# Patient Record
Sex: FEMALE | Race: WHITE | NOT HISPANIC OR LATINO | Employment: OTHER | URBAN - METROPOLITAN AREA
[De-identification: names, ages, dates, MRNs, and addresses within clinical notes are randomized per-mention and may not be internally consistent; named-entity substitution may affect disease eponyms.]

---

## 2017-03-28 ENCOUNTER — HISTORICAL (OUTPATIENT)
Dept: ADMINISTRATIVE | Facility: HOSPITAL | Age: 77
End: 2017-03-28

## 2017-05-18 RX ORDER — ENALAPRIL MALEATE 10 MG/1
TABLET ORAL
Qty: 90 TABLET | Refills: 3 | Status: SHIPPED | OUTPATIENT
Start: 2017-05-18 | End: 2018-05-10 | Stop reason: SDUPTHER

## 2017-05-18 RX ORDER — MONTELUKAST SODIUM 10 MG/1
TABLET ORAL
Qty: 90 TABLET | Refills: 3 | Status: SHIPPED | OUTPATIENT
Start: 2017-05-18 | End: 2018-05-10 | Stop reason: SDUPTHER

## 2017-06-02 RX ORDER — SIMVASTATIN 20 MG/1
TABLET, FILM COATED ORAL
Qty: 90 TABLET | Refills: 2 | Status: SHIPPED | OUTPATIENT
Start: 2017-06-02 | End: 2018-03-05 | Stop reason: SDUPTHER

## 2017-07-17 RX ORDER — ALBUTEROL SULFATE 0.83 MG/ML
SOLUTION RESPIRATORY (INHALATION)
Qty: 150 ML | Refills: 10 | Status: SHIPPED | OUTPATIENT
Start: 2017-07-17 | End: 2021-11-05

## 2017-07-28 ENCOUNTER — OFFICE VISIT (OUTPATIENT)
Dept: FAMILY MEDICINE | Facility: CLINIC | Age: 77
End: 2017-07-28
Payer: MEDICARE

## 2017-07-28 VITALS
DIASTOLIC BLOOD PRESSURE: 70 MMHG | HEART RATE: 76 BPM | BODY MASS INDEX: 29.39 KG/M2 | HEIGHT: 58 IN | RESPIRATION RATE: 16 BRPM | TEMPERATURE: 99 F | WEIGHT: 140 LBS | SYSTOLIC BLOOD PRESSURE: 110 MMHG

## 2017-07-28 DIAGNOSIS — M17.11 ARTHRITIS OF RIGHT KNEE: ICD-10-CM

## 2017-07-28 DIAGNOSIS — M19.90 OSTEOARTHRITIS, UNSPECIFIED OSTEOARTHRITIS TYPE, UNSPECIFIED SITE: ICD-10-CM

## 2017-07-28 DIAGNOSIS — K57.32 DIVERTICULITIS OF LARGE INTESTINE WITHOUT PERFORATION OR ABSCESS WITHOUT BLEEDING: ICD-10-CM

## 2017-07-28 PROCEDURE — 1159F MED LIST DOCD IN RCRD: CPT | Mod: ,,, | Performed by: FAMILY MEDICINE

## 2017-07-28 PROCEDURE — 99202 OFFICE O/P NEW SF 15 MIN: CPT | Mod: ,,, | Performed by: FAMILY MEDICINE

## 2017-07-28 RX ORDER — MELOXICAM 7.5 MG/1
7.5 TABLET ORAL DAILY
Qty: 30 TABLET | Refills: 11 | Status: SHIPPED | OUTPATIENT
Start: 2017-07-28 | End: 2018-03-16 | Stop reason: ALTCHOICE

## 2017-07-28 NOTE — PROGRESS NOTES
Subjective:       Patient ID: Teressa Benson is a 77 y.o. female.    Chief Complaint: Diverticulitis and Leg Pain    Admitted in the hospital last Thursday with diverticulitis. She was still having a lot of pain upon discharge but pain has improved since that time still on Levaquin and Flagyl since discharge. Previous episode was 2-1/2 years ago.      Hip Pain    Incident onset: off and on years. Had a workup for this in February which include x-ray showing some spondylolysis spondylolisthesis and and L6 lumbar vertebra but no acute fractures or subluxation beyond that. She has not been taking anything prescription wise for this.  The treatment provided mild relief.    Review of Systems   Musculoskeletal:        She says she does have pain in the right knee which is worsened with increased activity and and gets better with rest.       Objective:      Physical Exam   Pulmonary/Chest: Effort normal and breath sounds normal. No respiratory distress. She has no wheezes. She has no rales. She exhibits no tenderness.   Abdominal: Soft. Bowel sounds are normal. She exhibits no distension and no mass. There is tenderness. There is guarding (minimal). There is no rebound. No hernia.       Assessment:       1. Osteoarthritis, unspecified osteoarthritis type, unspecified site    2. Arthritis of right knee    3. Diverticulitis of large intestine without perforation or abscess without bleeding        Plan:       Teressa was seen today for diverticulitis and leg pain.    Diagnoses and all orders for this visit:    Osteoarthritis, unspecified osteoarthritis type, unspecified site  -     Ambulatory referral to Orthopedics    Arthritis of right knee  -     Ambulatory referral to Orthopedics    Diverticulitis of large intestine without perforation or abscess without bleeding  -     CBC auto differential; Future  -     C-reactive protein; Future  -     Rheumatoid factor; Future  -     CBC auto differential  -     C-reactive protein  -      Rheumatoid factor    Other orders  -     meloxicam (MOBIC) 7.5 MG tablet; Take 1 tablet (7.5 mg total) by mouth once daily.         Return in about 6 months (around 1/28/2018).

## 2017-08-10 LAB
BASOPHILS NFR BLD: 0.1 K/UL (ref 0–0.2)
BASOPHILS NFR BLD: 0.9 %
CRP SERPL-MCNC: 0.11 MG/DL (ref 0–1.4)
EOSINOPHIL NFR BLD: 0.4 K/UL (ref 0–0.7)
EOSINOPHIL NFR BLD: 6.8 %
ERYTHROCYTE [DISTWIDTH] IN BLOOD BY AUTOMATED COUNT: 13.4 % (ref 11.7–14.9)
GRAN #: 2.7 K/UL (ref 1.4–6.5)
GRAN%: 41.9 %
HCT VFR BLD AUTO: 43.9 % (ref 36–48)
HGB BLD-MCNC: 14.5 G/DL (ref 12–15)
IMMATURE GRANS (ABS): 0 K/UL (ref 0–1)
IMMATURE GRANULOCYTES: 0.3 %
LYMPH #: 2.7 K/UL (ref 1.2–3.4)
LYMPH%: 41.6 %
MCH RBC QN AUTO: 30.7 PG (ref 25–35)
MCHC RBC AUTO-ENTMCNC: 33 G/DL (ref 31–36)
MCV RBC AUTO: 92.8 FL (ref 79–98)
MONO #: 0.6 K/UL (ref 0.1–0.6)
MONO%: 8.5 %
NUCLEATED RBCS: 0 %
PLATELET # BLD AUTO: 245 K/UL (ref 140–440)
PMV BLD AUTO: 9.5 FL (ref 8.8–12.7)
RBC # BLD AUTO: 4.73 M/UL (ref 3.5–5.5)
WBC # BLD AUTO: 6.5 K/UL (ref 5–10)

## 2017-08-11 LAB — RHEUMATOID FACT SERPL-ACNC: <10 IU/ML (ref 0–13.9)

## 2017-09-27 ENCOUNTER — OFFICE VISIT (OUTPATIENT)
Dept: FAMILY MEDICINE | Facility: CLINIC | Age: 77
End: 2017-09-27
Payer: MEDICARE

## 2017-09-27 VITALS
SYSTOLIC BLOOD PRESSURE: 124 MMHG | HEIGHT: 59 IN | BODY MASS INDEX: 28.63 KG/M2 | TEMPERATURE: 98 F | RESPIRATION RATE: 20 BRPM | WEIGHT: 142 LBS | HEART RATE: 68 BPM | DIASTOLIC BLOOD PRESSURE: 80 MMHG

## 2017-09-27 DIAGNOSIS — R73.02 IGT (IMPAIRED GLUCOSE TOLERANCE): ICD-10-CM

## 2017-09-27 DIAGNOSIS — J44.9 CHRONIC OBSTRUCTIVE PULMONARY DISEASE, UNSPECIFIED COPD TYPE: ICD-10-CM

## 2017-09-27 DIAGNOSIS — I10 ESSENTIAL HYPERTENSION: ICD-10-CM

## 2017-09-27 PROCEDURE — 3008F BODY MASS INDEX DOCD: CPT | Mod: ,,, | Performed by: FAMILY MEDICINE

## 2017-09-27 PROCEDURE — 99214 OFFICE O/P EST MOD 30 MIN: CPT | Mod: ,,, | Performed by: FAMILY MEDICINE

## 2017-09-27 PROCEDURE — 1159F MED LIST DOCD IN RCRD: CPT | Mod: ,,, | Performed by: FAMILY MEDICINE

## 2017-09-27 NOTE — LETTER
[facility]      LANRE FLANAGAN MD  [facility]      Re: Name: Teressa Benson          : 1940        Teressa Benson has been examined by me on this date, and appears to be physically well for       _x__ General Anesthesia and surgery      ___ Sports Participation     ___ School /      ___ Camp        Sincerely,      Lanre Flanagan MD                Billing & Mailing Address  [address] [address]  [place] [address]  Phone: [phone]    Fax: [phone]              [email or web address]

## 2017-09-27 NOTE — PROGRESS NOTES
Subjective:       Patient ID: Teressa Benson is a 77 y.o. female.    Chief Complaint: Pre-op Exam (cataract surgery)    Here for preop physical for cataract surgery right eye first. Doing well she does have arthritis and some chronic pain which the meloxicam is helping      Review of Systems    Objective:      Physical Exam   Cardiovascular: Normal rate, regular rhythm, normal heart sounds and intact distal pulses.  Exam reveals no gallop and no friction rub.    No murmur heard.  Pulmonary/Chest: Effort normal and breath sounds normal. No respiratory distress. She has no wheezes. She has no rales. She exhibits no tenderness.   Abdominal: Soft. Bowel sounds are normal.       Assessment:       1. IGT (impaired glucose tolerance)    2. Chronic obstructive pulmonary disease, unspecified COPD type    3. Essential hypertension        Plan:       Teressa was seen today for pre-op exam.    Diagnoses and all orders for this visit:    IGT (impaired glucose tolerance)    Chronic obstructive pulmonary disease, unspecified COPD type    Essential hypertension         No Follow-up on file.

## 2018-01-26 ENCOUNTER — OFFICE VISIT (OUTPATIENT)
Dept: FAMILY MEDICINE | Facility: CLINIC | Age: 78
End: 2018-01-26
Payer: MEDICARE

## 2018-01-26 VITALS
DIASTOLIC BLOOD PRESSURE: 70 MMHG | TEMPERATURE: 99 F | HEIGHT: 59 IN | BODY MASS INDEX: 28.63 KG/M2 | SYSTOLIC BLOOD PRESSURE: 120 MMHG | WEIGHT: 142 LBS | RESPIRATION RATE: 20 BRPM | HEART RATE: 84 BPM

## 2018-01-26 DIAGNOSIS — J44.9 CHRONIC OBSTRUCTIVE PULMONARY DISEASE, UNSPECIFIED COPD TYPE: Primary | ICD-10-CM

## 2018-01-26 DIAGNOSIS — I10 ESSENTIAL HYPERTENSION: ICD-10-CM

## 2018-01-26 DIAGNOSIS — R73.02 IGT (IMPAIRED GLUCOSE TOLERANCE): ICD-10-CM

## 2018-01-26 PROCEDURE — 83036 HEMOGLOBIN GLYCOSYLATED A1C: CPT | Mod: ,,, | Performed by: FAMILY MEDICINE

## 2018-01-26 PROCEDURE — 82947 ASSAY GLUCOSE BLOOD QUANT: CPT | Mod: QW,,, | Performed by: FAMILY MEDICINE

## 2018-01-26 PROCEDURE — 80061 LIPID PANEL: CPT | Mod: QW,,, | Performed by: FAMILY MEDICINE

## 2018-01-26 PROCEDURE — 99213 OFFICE O/P EST LOW 20 MIN: CPT | Mod: ,,, | Performed by: FAMILY MEDICINE

## 2018-01-26 RX ORDER — NEPAFENAC 3 MG/ML
SUSPENSION/ DROPS OPHTHALMIC
COMMUNITY
Start: 2017-10-22 | End: 2018-05-17

## 2018-01-26 RX ORDER — PREDNISOLONE ACETATE 10 MG/ML
SUSPENSION/ DROPS OPHTHALMIC
COMMUNITY
Start: 2017-11-06 | End: 2018-03-16 | Stop reason: ALTCHOICE

## 2018-01-26 RX ORDER — ALBUTEROL SULFATE 90 UG/1
2 AEROSOL, METERED RESPIRATORY (INHALATION) EVERY 6 HOURS PRN
Qty: 1 INHALER | Refills: 1 | Status: SHIPPED | OUTPATIENT
Start: 2018-01-26 | End: 2022-01-25

## 2018-01-26 RX ORDER — MOXIFLOXACIN 5 MG/ML
SOLUTION/ DROPS OPHTHALMIC
COMMUNITY
Start: 2017-10-19 | End: 2018-03-16 | Stop reason: ALTCHOICE

## 2018-01-26 NOTE — PROGRESS NOTES
Subjective:       Patient ID: Teressa Benson is a 77 y.o. female.    Chief Complaint: Follow-up (6 months  IGT)      Patient is here for follow-up on impaired glucose tolerance she did have a visit to the urgent care because of bronchitis approximately one month ago. Was treated with Zithromax and prednisone for 5 days has been off the prednisone for nearly a month.  Doing better. Taking Tylenol for degenerative joint disease arthritis in both knees not tolerate meloxicam        Diabetes   Diabetes visit type: impaired glucose tolerance. Associated symptoms include polyuria. Pertinent negatives for diabetes include no blurred vision, no chest pain, no fatigue, no foot paresthesias, no foot ulcerations, no polydipsia, no visual change, no weakness and no weight loss. Pertinent negatives for hypoglycemia complications include no blackouts and no hospitalization. Pertinent negatives for diabetic complications include no autonomic neuropathy, CVA, heart disease, impotence, nephropathy, peripheral neuropathy, PVD or retinopathy.   Still some a.m. wheezes using Qvar and albuterol on a regular basis.    Allergies and Medications:   Review of patient's allergies indicates:   Allergen Reactions    Ciprofloxacin     Demerol [meperidine]     Phenergan [promethazine]      Current Outpatient Prescriptions   Medication Sig Dispense Refill    albuterol (PROVENTIL) 2.5 mg /3 mL (0.083 %) nebulizer solution INHALE ONE VIAL VIA NEBULIZER EVERY FOUR HOURS AS NEEDED 150 mL 10    CRAN/VITC/MANNOSE/FOS/BROMELN (CYSTEX CRANBERRY ORAL) Take 1 tablet by mouth 3 (three) times daily as needed.      enalapril (VASOTEC) 10 MG tablet TAKE ONE TABLET BY MOUTH ONCE DAILY 90 tablet 3    ILEVRO 0.3 % DrpS       montelukast (SINGULAIR) 10 mg tablet TAKE ONE TABLET BY MOUTH ONCE DAILY 90 tablet 3    moxifloxacin (VIGAMOX) 0.5 % ophthalmic solution       prednisoLONE acetate (PRED FORTE) 1 % DrpS       QVAR 40 mcg/actuation Aero        simvastatin (ZOCOR) 20 MG tablet TAKE ONE TABLET BY MOUTH EVERY EVENING. 90 tablet 2    albuterol 90 mcg/actuation inhaler Inhale 2 puffs into the lungs every 6 (six) hours as needed for Wheezing. Rescue  every four to six hours as needed 1 Inhaler 1    meloxicam (MOBIC) 7.5 MG tablet Take 1 tablet (7.5 mg total) by mouth once daily. 30 tablet 11     No current facility-administered medications for this visit.        Family History:   Family History   Problem Relation Age of Onset    Asthma Mother     Heart failure Father        Social History:   Social History     Social History    Marital status:      Spouse name: N/A    Number of children: N/A    Years of education: N/A     Occupational History    Not on file.     Social History Main Topics    Smoking status: Former Smoker     Types: Cigarettes    Smokeless tobacco: Never Used    Alcohol use No    Drug use: No    Sexual activity: Not on file     Other Topics Concern    Not on file     Social History Narrative    No narrative on file       Review of Systems   Constitutional: Negative for fatigue and weight loss.   Eyes: Negative for blurred vision.   Cardiovascular: Negative for chest pain.   Endocrine: Positive for polyuria. Negative for polydipsia.   Genitourinary: Negative for impotence.   Neurological: Negative for weakness.       Objective:     Vitals:    01/26/18 1456   BP: 120/70   Pulse: 84   Resp: 20   Temp: 99.3 °F (37.4 °C)        Physical Exam    Assessment:       1. Chronic obstructive pulmonary disease, unspecified COPD type    2. Essential hypertension    3. IGT (impaired glucose tolerance)        Plan:       Teressa was seen today for follow-up.    Diagnoses and all orders for this visit:    Chronic obstructive pulmonary disease, unspecified COPD type  -     albuterol 90 mcg/actuation inhaler; Inhale 2 puffs into the lungs every 6 (six) hours as needed for Wheezing. Rescue  every four to six hours as needed    Essential  hypertension    IGT (impaired glucose tolerance)  -     Hemoglobin A1C, POCT  -     POCT Lipid Profile with Glucose; Future         Follow-up in about 3 months (around 4/26/2018).

## 2018-01-29 ENCOUNTER — TELEPHONE (OUTPATIENT)
Dept: FAMILY MEDICINE | Facility: CLINIC | Age: 78
End: 2018-01-29

## 2018-01-29 LAB
GLUCOSE SERPL-MCNC: NORMAL MG/DL (ref 70–110)
HBA1C MFR BLD: 5.7 %
POC CHOLESTEROL, HDL: 51
POC CHOLESTEROL, LDL: 87
POC CHOLESTEROL, TOTAL: 162 MG/DL
POC GLUCOSE FASTING: 105
POC TOTAL CHOLESTEROL / HDL RATIO: 3.1
POC TRIGLYCERIDES: 118

## 2018-01-29 NOTE — TELEPHONE ENCOUNTER
----- Message from Lanre Flanagan MD sent at 1/29/2018 11:25 AM CST -----  Labs okay. Impaired glucose tolerance is stable. Continue present meds and recheck in 1 year

## 2018-03-05 RX ORDER — SIMVASTATIN 20 MG/1
TABLET, FILM COATED ORAL
Qty: 90 TABLET | Refills: 1 | Status: SHIPPED | OUTPATIENT
Start: 2018-03-05 | End: 2023-10-31 | Stop reason: SDUPTHER

## 2018-03-16 ENCOUNTER — OFFICE VISIT (OUTPATIENT)
Dept: FAMILY MEDICINE | Facility: CLINIC | Age: 78
End: 2018-03-16
Payer: MEDICARE

## 2018-03-16 VITALS
RESPIRATION RATE: 20 BRPM | WEIGHT: 138 LBS | DIASTOLIC BLOOD PRESSURE: 76 MMHG | BODY MASS INDEX: 27.82 KG/M2 | HEART RATE: 76 BPM | SYSTOLIC BLOOD PRESSURE: 118 MMHG | TEMPERATURE: 99 F | HEIGHT: 59 IN

## 2018-03-16 DIAGNOSIS — J42 CHRONIC BRONCHITIS, UNSPECIFIED CHRONIC BRONCHITIS TYPE: Primary | ICD-10-CM

## 2018-03-16 DIAGNOSIS — J40 BRONCHITIS: ICD-10-CM

## 2018-03-16 PROCEDURE — 3078F DIAST BP <80 MM HG: CPT | Mod: ,,, | Performed by: FAMILY MEDICINE

## 2018-03-16 PROCEDURE — 96372 THER/PROPH/DIAG INJ SC/IM: CPT | Mod: ,,, | Performed by: FAMILY MEDICINE

## 2018-03-16 PROCEDURE — 99213 OFFICE O/P EST LOW 20 MIN: CPT | Mod: 25,,, | Performed by: FAMILY MEDICINE

## 2018-03-16 PROCEDURE — 3074F SYST BP LT 130 MM HG: CPT | Mod: ,,, | Performed by: FAMILY MEDICINE

## 2018-03-16 RX ORDER — AZITHROMYCIN 250 MG/1
250 TABLET, FILM COATED ORAL DAILY
Qty: 6 TABLET | Refills: 0 | Status: SHIPPED | OUTPATIENT
Start: 2018-03-16 | End: 2018-03-21

## 2018-03-16 RX ORDER — PROMETHAZINE HYDROCHLORIDE AND DEXTROMETHORPHAN HYDROBROMIDE 6.25; 15 MG/5ML; MG/5ML
5 SYRUP ORAL 4 TIMES DAILY
Qty: 180 ML | Refills: 5 | Status: SHIPPED | OUTPATIENT
Start: 2018-03-16 | End: 2018-03-26

## 2018-03-16 RX ORDER — BETAMETHASONE SODIUM PHOSPHATE AND BETAMETHASONE ACETATE 3; 3 MG/ML; MG/ML
6 INJECTION, SUSPENSION INTRA-ARTICULAR; INTRALESIONAL; INTRAMUSCULAR; SOFT TISSUE
Status: COMPLETED | OUTPATIENT
Start: 2018-03-16 | End: 2018-03-16

## 2018-03-16 RX ADMIN — BETAMETHASONE SODIUM PHOSPHATE AND BETAMETHASONE ACETATE 6 MG: 3; 3 INJECTION, SUSPENSION INTRA-ARTICULAR; INTRALESIONAL; INTRAMUSCULAR; SOFT TISSUE at 04:03

## 2018-03-16 NOTE — PROGRESS NOTES
Subjective:       Patient ID: Teressa Benson is a 78 y.o. female.    Chief Complaint: URI and Cough      Patient relates that she has had a bad head cold since Sunday is now progressing into the chest      URI    There has been no fever. Associated symptoms include coughing, a plugged ear sensation and sinus pain. Pertinent negatives include no abdominal pain, ear pain, nausea, rash or vomiting. Improvement on treatment: A she does use her Qvar twice a day and her albuterol nebulizer on a regular basis.   Cough   Pertinent negatives include no ear pain or rash.       Allergies and Medications:   Review of patient's allergies indicates:   Allergen Reactions    Ciprofloxacin     Demerol [meperidine]     Phenergan [promethazine]      Current Outpatient Prescriptions   Medication Sig Dispense Refill    albuterol (PROVENTIL) 2.5 mg /3 mL (0.083 %) nebulizer solution INHALE ONE VIAL VIA NEBULIZER EVERY FOUR HOURS AS NEEDED 150 mL 10    albuterol 90 mcg/actuation inhaler Inhale 2 puffs into the lungs every 6 (six) hours as needed for Wheezing. Rescue  every four to six hours as needed 1 Inhaler 1    CRAN/VITC/MANNOSE/FOS/BROMELN (CYSTEX CRANBERRY ORAL) Take 1 tablet by mouth 3 (three) times daily as needed.      enalapril (VASOTEC) 10 MG tablet TAKE ONE TABLET BY MOUTH ONCE DAILY 90 tablet 3    montelukast (SINGULAIR) 10 mg tablet TAKE ONE TABLET BY MOUTH ONCE DAILY 90 tablet 3    QVAR 40 mcg/actuation Aero       simvastatin (ZOCOR) 20 MG tablet TAKE ONE TABLET BY MOUTH EVERY EVENING. 90 tablet 1    ILEVRO 0.3 % DrpS        No current facility-administered medications for this visit.        Family History:   Family History   Problem Relation Age of Onset    Asthma Mother     Heart failure Father        Social History:   Social History     Social History    Marital status:      Spouse name: N/A    Number of children: N/A    Years of education: N/A     Occupational History    Not on file.     Social  History Main Topics    Smoking status: Former Smoker     Types: Cigarettes    Smokeless tobacco: Never Used    Alcohol use No    Drug use: No    Sexual activity: Not on file     Other Topics Concern    Not on file     Social History Narrative    No narrative on file       Review of Systems   HENT: Positive for sinus pain. Negative for ear pain.    Respiratory: Positive for cough.    Gastrointestinal: Negative for abdominal pain, nausea and vomiting.   Skin: Negative for rash.       Objective:     Vitals:    03/16/18 1557   BP: 118/76   Pulse: 76   Resp: 20   Temp: 98.8 °F (37.1 °C)        Physical Exam   Constitutional: She appears well-developed and well-nourished. No distress.   HENT:   Head: Atraumatic. Microcephalic.   Right Ear: Hearing, tympanic membrane, external ear and ear canal normal. No drainage, swelling or tenderness. No foreign bodies. Tympanic membrane is not injected, not scarred, not perforated, not erythematous, not retracted and not bulging. Tympanic membrane mobility is normal. No middle ear effusion. No hemotympanum. No decreased hearing is noted.   Left Ear: Hearing, tympanic membrane, external ear and ear canal normal. No drainage, swelling or tenderness. No foreign bodies. Tympanic membrane is not injected, not scarred, not perforated, not erythematous, not retracted and not bulging. Tympanic membrane mobility is normal.  No middle ear effusion. No hemotympanum. No decreased hearing is noted.   Nose: Nose normal. No mucosal edema, rhinorrhea, nose lacerations, sinus tenderness, nasal deformity or septal deviation. Right sinus exhibits no maxillary sinus tenderness and no frontal sinus tenderness. Left sinus exhibits no maxillary sinus tenderness and no frontal sinus tenderness.   Mouth/Throat: Uvula is midline and oropharynx is clear and moist. Normal dentition. No oropharyngeal exudate, posterior oropharyngeal edema, posterior oropharyngeal erythema or tonsillar abscesses.   Eyes:  Conjunctivae and EOM are normal. Pupils are equal, round, and reactive to light. Right eye exhibits no discharge. Left eye exhibits no discharge. No scleral icterus.   Neck: Normal range of motion. Neck supple. No thyromegaly present.   Cardiovascular: Normal rate, regular rhythm, normal heart sounds and intact distal pulses.  Exam reveals no gallop and no friction rub.    No murmur heard.  Pulmonary/Chest: Effort normal. No stridor. No respiratory distress. She has wheezes. She has no rales. She exhibits no tenderness.   Lymphadenopathy:     She has no cervical adenopathy.   Skin: She is not diaphoretic.   Nursing note and vitals reviewed.      Assessment:       1. Chronic bronchitis, unspecified chronic bronchitis type    2. Bronchitis        Plan:       Teressa was seen today for uri and cough.    Diagnoses and all orders for this visit:    Chronic bronchitis, unspecified chronic bronchitis type    Bronchitis         Follow-up in about 1 month (around 4/16/2018), or if symptoms worsen or fail to improve.

## 2018-04-05 ENCOUNTER — TELEPHONE (OUTPATIENT)
Dept: FAMILY MEDICINE | Facility: CLINIC | Age: 78
End: 2018-04-05

## 2018-04-05 NOTE — TELEPHONE ENCOUNTER
----- Message from Lanre Flanagan MD sent at 4/5/2018  1:14 PM CDT -----  Mammogram is negative. Repeat in one year

## 2018-05-10 RX ORDER — MONTELUKAST SODIUM 10 MG/1
TABLET ORAL
Qty: 90 TABLET | Refills: 2 | Status: SHIPPED | OUTPATIENT
Start: 2018-05-10 | End: 2023-10-31 | Stop reason: SDUPTHER

## 2018-05-10 RX ORDER — ENALAPRIL MALEATE 10 MG/1
TABLET ORAL
Qty: 90 TABLET | Refills: 2 | Status: SHIPPED | OUTPATIENT
Start: 2018-05-10 | End: 2023-10-31 | Stop reason: SDUPTHER

## 2018-05-17 ENCOUNTER — OFFICE VISIT (OUTPATIENT)
Dept: FAMILY MEDICINE | Facility: CLINIC | Age: 78
End: 2018-05-17
Payer: MEDICARE

## 2018-05-17 VITALS
BODY MASS INDEX: 28.63 KG/M2 | RESPIRATION RATE: 16 BRPM | DIASTOLIC BLOOD PRESSURE: 68 MMHG | HEIGHT: 59 IN | TEMPERATURE: 98 F | SYSTOLIC BLOOD PRESSURE: 112 MMHG | WEIGHT: 142 LBS | HEART RATE: 56 BPM | OXYGEN SATURATION: 96 %

## 2018-05-17 DIAGNOSIS — R73.02 IGT (IMPAIRED GLUCOSE TOLERANCE): ICD-10-CM

## 2018-05-17 DIAGNOSIS — Z00.00 WELL ADULT EXAM: Primary | ICD-10-CM

## 2018-05-17 DIAGNOSIS — I10 ESSENTIAL HYPERTENSION: ICD-10-CM

## 2018-05-17 DIAGNOSIS — E78.2 MIXED HYPERLIPIDEMIA: ICD-10-CM

## 2018-05-17 DIAGNOSIS — J42 CHRONIC BRONCHITIS, UNSPECIFIED CHRONIC BRONCHITIS TYPE: ICD-10-CM

## 2018-05-17 DIAGNOSIS — M17.0 PRIMARY OSTEOARTHRITIS OF BOTH KNEES: ICD-10-CM

## 2018-05-17 DIAGNOSIS — E66.3 OVERWEIGHT (BMI 25.0-29.9): ICD-10-CM

## 2018-05-17 PROCEDURE — 3074F SYST BP LT 130 MM HG: CPT | Mod: ,,, | Performed by: FAMILY MEDICINE

## 2018-05-17 PROCEDURE — 99397 PER PM REEVAL EST PAT 65+ YR: CPT | Mod: ,,, | Performed by: FAMILY MEDICINE

## 2018-05-17 PROCEDURE — 3078F DIAST BP <80 MM HG: CPT | Mod: ,,, | Performed by: FAMILY MEDICINE

## 2018-05-17 NOTE — PROGRESS NOTES
Subjective:       Patient ID: Teressa Benson is a 78 y.o. female.    Chief Complaint: Annual Exam      Patient is here for annual checkup today she had a bronchitis in February and had a mammogram and chest x-ray at that time.        Allergies and Medications:   Review of patient's allergies indicates:   Allergen Reactions    Ciprofloxacin     Demerol [meperidine]     Phenergan [promethazine]      Current Outpatient Prescriptions   Medication Sig Dispense Refill    albuterol (PROVENTIL) 2.5 mg /3 mL (0.083 %) nebulizer solution INHALE ONE VIAL VIA NEBULIZER EVERY FOUR HOURS AS NEEDED 150 mL 10    albuterol 90 mcg/actuation inhaler Inhale 2 puffs into the lungs every 6 (six) hours as needed for Wheezing. Rescue  every four to six hours as needed 1 Inhaler 1    CRAN/VITC/MANNOSE/FOS/BROMELN (CYSTEX CRANBERRY ORAL) Take 1 tablet by mouth 3 (three) times daily as needed.      enalapril (VASOTEC) 10 MG tablet TAKE ONE TABLET BY MOUTH ONCE DAILY 90 tablet 2    montelukast (SINGULAIR) 10 mg tablet TAKE ONE TABLET BY MOUTH ONCE DAILY 90 tablet 2    QVAR 40 mcg/actuation Aero INHALE ONE PUFF BY MOUTH TWICE DAILY 9 each 10    simvastatin (ZOCOR) 20 MG tablet TAKE ONE TABLET BY MOUTH EVERY EVENING. 90 tablet 1     No current facility-administered medications for this visit.        Family History:   Family History   Problem Relation Age of Onset    Asthma Mother     Heart failure Father        Social History:   Social History     Social History    Marital status:      Spouse name: N/A    Number of children: N/A    Years of education: N/A     Occupational History    Not on file.     Social History Main Topics    Smoking status: Former Smoker     Types: Cigarettes    Smokeless tobacco: Never Used    Alcohol use No    Drug use: No    Sexual activity: Not on file     Other Topics Concern    Not on file     Social History Narrative    No narrative on file       Review of Systems   Constitutional:  Positive for unexpected weight change (5 lb wt loss, still overweight). Negative for activity change, appetite change, chills, diaphoresis, fatigue and fever.   HENT: Negative for congestion, dental problem, drooling, ear discharge, ear pain, facial swelling, hearing loss, mouth sores, nosebleeds, postnasal drip, rhinorrhea, sinus pain, sinus pressure, sneezing, sore throat, tinnitus, trouble swallowing and voice change.    Eyes: Negative for photophobia, pain, discharge, redness, itching and visual disturbance.   Respiratory: Negative for apnea, cough, choking, chest tightness, shortness of breath, wheezing and stridor.    Cardiovascular: Negative for chest pain, palpitations and leg swelling.   Gastrointestinal: Negative for abdominal distention, abdominal pain, anal bleeding, blood in stool, constipation, diarrhea, nausea, rectal pain and vomiting.   Endocrine: Negative for cold intolerance, heat intolerance, polydipsia, polyphagia and polyuria.   Genitourinary: Negative for decreased urine volume, difficulty urinating, dyspareunia, dysuria, enuresis, flank pain, frequency, genital sores, hematuria, menstrual problem, pelvic pain, urgency, vaginal bleeding, vaginal discharge and vaginal pain.   Musculoskeletal: Positive for arthralgias (knees). Negative for back pain, gait problem, joint swelling, myalgias, neck pain and neck stiffness.   Skin: Negative for color change, pallor, rash and wound.   Allergic/Immunologic: Negative for environmental allergies, food allergies and immunocompromised state.   Neurological: Negative for dizziness, tremors, seizures, syncope, facial asymmetry, speech difficulty, weakness, light-headedness, numbness and headaches.   Hematological: Negative for adenopathy. Does not bruise/bleed easily.   Psychiatric/Behavioral: Negative for agitation, behavioral problems, confusion, decreased concentration, dysphoric mood, hallucinations, self-injury, sleep disturbance and suicidal ideas. The  patient is not nervous/anxious and is not hyperactive.        Objective:     Vitals:    05/17/18 1052   BP: 112/68   Pulse: (!) 56   Resp: 16   Temp: 97.6 °F (36.4 °C)        Physical Exam   Constitutional: She is oriented to person, place, and time. She appears well-developed and well-nourished. No distress.   HENT:   Head: Normocephalic and atraumatic.   Right Ear: External ear normal.   Left Ear: External ear normal.   Nose: Nose normal.   Mouth/Throat: Oropharynx is clear and moist. No oropharyngeal exudate.   Eyes: Conjunctivae and EOM are normal. Pupils are equal, round, and reactive to light. Right eye exhibits no discharge. Left eye exhibits no discharge. No scleral icterus.   Neck: Normal range of motion. Neck supple. No JVD present. No tracheal deviation present. No thyromegaly present.   Cardiovascular: Normal rate, regular rhythm, normal heart sounds and intact distal pulses.  Exam reveals no gallop and no friction rub.    No murmur heard.  Pulmonary/Chest: Effort normal and breath sounds normal. No stridor. No respiratory distress. She has no wheezes. She has no rales. She exhibits no tenderness.   Abdominal: Soft. Bowel sounds are normal. She exhibits no distension and no mass. There is no tenderness. There is no rebound and no guarding. No hernia.   Genitourinary: Vagina normal and uterus normal. Rectal exam shows guaiac negative stool. No vaginal discharge found.   Musculoskeletal: Normal range of motion. She exhibits no edema, tenderness or deformity.   Lymphadenopathy:     She has no cervical adenopathy.   Neurological: She is alert and oriented to person, place, and time. She has normal reflexes. She displays normal reflexes. No cranial nerve deficit or sensory deficit. She exhibits normal muscle tone. Coordination normal.   Skin: Skin is warm and dry. Capillary refill takes less than 2 seconds. No rash noted. She is not diaphoretic. No erythema. No pallor.   Psychiatric: She has a normal mood and  affect. Her behavior is normal. Judgment and thought content normal.   Nursing note and vitals reviewed.      Assessment:       1. IGT (impaired glucose tolerance)    2. Chronic bronchitis, unspecified chronic bronchitis type    3. Essential hypertension    4. Overweight (BMI 25.0-29.9)    5. Mixed hyperlipidemia    6. Primary osteoarthritis of both knees      well adult-patient's immunizations are up-to-date she said she had a shingles vaccine at [facility] last year   Plan:       Teressa was seen today for annual exam.    Diagnoses and all orders for this visit:    Well adult exam  -     Hemoglobin A1c; Future  -     Lipid panel; Future  -     Basic metabolic panel; Future  -     Hepatic function panel; Future  -     Hepatitis C antibody; Future  -     Hemoglobin A1c  -     Lipid panel  -     Basic metabolic panel  -     Hepatic function panel  -     Hepatitis C antibody    IGT (impaired glucose tolerance)  -     Hemoglobin A1c; Future  -     Lipid panel; Future  -     Basic metabolic panel; Future  -     Hepatic function panel; Future  -     Hemoglobin A1c  -     Lipid panel  -     Basic metabolic panel  -     Hepatic function panel    Chronic bronchitis, unspecified chronic bronchitis type    Essential hypertension    Overweight (BMI 25.0-29.9)    Mixed hyperlipidemia  -     Lipid panel; Future  -     Lipid panel    Primary osteoarthritis of both knees         Follow-up in about 6 months (around 11/17/2018), or if symptoms worsen or fail to improve.

## 2018-05-17 NOTE — PATIENT INSTRUCTIONS
Diabetes: Learning About Serving and Portion Sizes     A good rule of thumb: Devote half your plate to vegetables and green salad. Split the other half between protein and starchy carbohydrates. Fruit makes a good dessert.     Servings and portions. Whats the difference? These terms can be very confusing. But learning to measure serving sizes can help you figure out how many carbohydrates (carbs) and other foods you eat each day. They are also powerful tools for managing your weight.  Servings and portions  Many different words are used to describe amounts of food. If your health care provider uses a term youre not sure of, dont be afraid to ask. It helps to know the difference between servings and portions:  · A serving size is a fixed size. Food producers use this term to describe their products. For example, the label on a cereal box could say that 1 cup of dry cereal = 1 serving.  · A portion (also called a helping) is how much you eat or how much you put on your plate at a meal. For example, you might eat 2 cups of cereal at breakfast.  Using serving information  The portion you choose to eat (such as 2 cups of cereal) may be more than one serving as listed on the food label (such as 1 cup of cereal). Thats why it helps to measure or weigh the food you eat. Because the food label values are based on servings, youll need to know how many servings you eat at one sitting.     Ounces: 2 to 3 ounces is about the size of your palm.       1 Cup: 1 cup (or a medium-sized piece) is about the size of your fist.       1/2 Cup: 1/2 cup is about the size of your cupped hand.      One tablespoon is about the size of your thumb.  One teaspoon is about the size of the tip of your thumb.  Keeping track of serving sizes  When youre planning for a snack or a meal, keep servings in mind. If you dont have measuring cups or a scale handy, there are ways to eyeball serving sizes, such as comparing your food to the size  of your hand (see pictures above).  Managing portion sizes  If your weight is a concern, reducing your portions can help. You can eat more than one serving of a food at once. But to keep from eating too much at one meal, learn how to manage your portions. A portion is the amount of each type of food on your plate. See the plate diagram for an example of balanced portions.  Date Last Reviewed: 3/1/2016  © 6726-5265 [employer]. [address] [place] [address], [place] [address]. All rights reserved. This information is not intended as a substitute for professional medical care. Always follow your healthcare professional's instructions.      bety Hutchins

## 2018-05-22 LAB
ALBUMIN SERPL-MCNC: 4.2 G/DL (ref 3.1–4.7)
ALP SERPL-CCNC: 53 IU/L (ref 40–104)
ALT (SGPT): 17 IU/L (ref 3–33)
AST SERPL-CCNC: 21 IU/L (ref 10–40)
BILIRUB SERPL-MCNC: 0.5 MG/DL (ref 0.3–1)
BILIRUBIN DIRECT+TOT PNL SERPL-MCNC: <0.1 MG/DL (ref 0–0.2)
BUN SERPL-MCNC: 14 MG/DL (ref 8–20)
CALCIUM SERPL-MCNC: 9.3 MG/DL (ref 7.7–10.4)
CHLORIDE: 100 MMOL/L (ref 98–110)
CO2 SERPL-SCNC: 28.6 MMOL/L (ref 22.8–31.6)
CREATININE: 0.7 MG/DL (ref 0.6–1.4)
GLUCOSE: 107 MG/DL (ref 70–99)
HBA1C MFR BLD: 5.6 % (ref 3.1–6.5)
POTASSIUM SERPL-SCNC: 4.4 MMOL/L (ref 3.5–5)
PROT SERPL-MCNC: 7.4 G/DL (ref 6–8.2)
SODIUM: 137 MMOL/L (ref 134–144)

## 2018-05-23 LAB — HCV AB SERPL QL IA: <0.1 S/CO RATIO (ref 0–0.9)

## 2018-08-20 PROBLEM — Z00.00 WELL ADULT EXAM: Status: RESOLVED | Noted: 2018-05-17 | Resolved: 2018-08-20

## 2019-01-24 ENCOUNTER — OFFICE VISIT (OUTPATIENT)
Dept: URGENT CARE | Facility: CLINIC | Age: 79
End: 2019-01-24
Payer: MEDICARE

## 2019-01-24 VITALS
HEART RATE: 70 BPM | RESPIRATION RATE: 12 BRPM | SYSTOLIC BLOOD PRESSURE: 147 MMHG | TEMPERATURE: 97 F | DIASTOLIC BLOOD PRESSURE: 64 MMHG | BODY MASS INDEX: 29.07 KG/M2 | HEIGHT: 59 IN | OXYGEN SATURATION: 96 % | WEIGHT: 144.19 LBS

## 2019-01-24 DIAGNOSIS — R52 PAIN AT INJECTION SITE, INITIAL ENCOUNTER: Primary | ICD-10-CM

## 2019-01-24 DIAGNOSIS — T80.89XA PAIN AT INJECTION SITE, INITIAL ENCOUNTER: Primary | ICD-10-CM

## 2019-01-24 PROCEDURE — 1101F PR PT FALLS ASSESS DOC 0-1 FALLS W/OUT INJ PAST YR: ICD-10-PCS | Mod: CPTII,S$GLB,, | Performed by: NURSE PRACTITIONER

## 2019-01-24 PROCEDURE — 99204 PR OFFICE/OUTPT VISIT, NEW, LEVL IV, 45-59 MIN: ICD-10-PCS | Mod: S$GLB,,, | Performed by: NURSE PRACTITIONER

## 2019-01-24 PROCEDURE — 3078F PR MOST RECENT DIASTOLIC BLOOD PRESSURE < 80 MM HG: ICD-10-PCS | Mod: CPTII,S$GLB,, | Performed by: NURSE PRACTITIONER

## 2019-01-24 PROCEDURE — 3077F SYST BP >= 140 MM HG: CPT | Mod: CPTII,S$GLB,, | Performed by: NURSE PRACTITIONER

## 2019-01-24 PROCEDURE — 3078F DIAST BP <80 MM HG: CPT | Mod: CPTII,S$GLB,, | Performed by: NURSE PRACTITIONER

## 2019-01-24 PROCEDURE — 99204 OFFICE O/P NEW MOD 45 MIN: CPT | Mod: S$GLB,,, | Performed by: NURSE PRACTITIONER

## 2019-01-24 PROCEDURE — 1101F PT FALLS ASSESS-DOCD LE1/YR: CPT | Mod: CPTII,S$GLB,, | Performed by: NURSE PRACTITIONER

## 2019-01-24 PROCEDURE — 3077F PR MOST RECENT SYSTOLIC BLOOD PRESSURE >= 140 MM HG: ICD-10-PCS | Mod: CPTII,S$GLB,, | Performed by: NURSE PRACTITIONER

## 2019-01-24 RX ORDER — METHYLPREDNISOLONE 4 MG/1
4 TABLET ORAL DAILY
Qty: 1 PACKAGE | Refills: 0 | Status: SHIPPED | OUTPATIENT
Start: 2019-01-24 | End: 2019-11-25 | Stop reason: CLARIF

## 2019-01-24 RX ORDER — NAPROXEN 500 MG/1
500 TABLET ORAL 2 TIMES DAILY WITH MEALS
Qty: 30 TABLET | Refills: 0 | Status: SHIPPED | OUTPATIENT
Start: 2019-01-24 | End: 2019-11-25 | Stop reason: CLARIF

## 2019-01-24 NOTE — PATIENT INSTRUCTIONS
Understanding Post-Injection Inflammation  After an injection, swelling and irritation (inflammation) can occur at the site where the needle entered the skin. This is a reaction to the needle or to the medicine that was injected. Or it may be a reaction to both. The reaction may happen right away. Or it may only begin hours after the injection. In most cases, the reaction is not serious and goes away on its own.  What causes post-injection inflammation?  The most common cause is the skins response to the needle or the medicine. Less common causes include an allergic reaction to the medicine. Or you may have an infection at the injection site.  Symptoms of post-injection inflammation  Symptoms at the injection site may include:  · Swelling  · Itching  · Pain  · Redness  · Warmth  · Drainage at the injection site  · Rash  Treatment for post-injection inflammation  Treatment depends on the cause and how bad the reaction is. Most post-injection inflammation is mild. It goes away on its own in hours to days. If treatment is needed, it may include:  · Cold packs. These help reduce swelling, itching, and pain.  · Over-the-counter pain medicines. These help reduce pain and inflammation.  · Prescription medicine. These treat infection.  Possible complications of post-injection inflammation  Be alert for a reaction all over your body. This may cause symptoms such as a skin rash, severe itching, or raised fluid-filled bumps called hives. This kind of reaction can be serious, especially if it affects breathing. If you or your child develops symptoms away from the injection site, call your healthcare provider for further instructions.  When to call your healthcare provider  Call your healthcare provider right away if you have any of these:  · Fever of 100.4°F (38°C) or higher, or as directed  · Severe pain at the injection site  · Blistering at the injection site  · Muscle aches  · Upset stomach (nausea), headache, or  dizziness  · Skin rash, severe itching, or hives  · Swelling of the lips, tongue, or throat  · Symptoms that get worse instead of better   Date Last Reviewed: 5/1/2016  © 8873-1222 [employer]. [address] [place] [address], [place] [address]. All rights reserved. This information is not intended as a substitute for professional medical care. Always follow your healthcare professional's instructions.

## 2019-01-24 NOTE — PROGRESS NOTES
"Subjective:       Patient ID: Teressa Benson is a 78 y.o. female.    Vitals:  height is 4' 11" (1.499 m) and weight is 65.4 kg (144 lb 3.2 oz). Her temperature is 97.4 °F (36.3 °C). Her blood pressure is 147/64 (abnormal) and her pulse is 70. Her respiration is 12 and oxygen saturation is 96%.     Chief Complaint: Arm Pain    Left arm pain - pt states unable to raise arm above heard c/o pain X about a month now. Pt states the pain started after she got her flu shot.   Has tried warm compresses and tylenol, which helps with pain, but states it never completely goes away  Denies trauma or injury      Arm Pain    The incident occurred more than 1 week ago. The incident occurred at home. There was no injury mechanism. The pain is present in the left shoulder. The pain is mild. The pain has been fluctuating since the incident. Pertinent negatives include no chest pain. She has tried acetaminophen for the symptoms. The treatment provided moderate relief.       Constitution: Negative for chills, fatigue and fever.   HENT: Negative for congestion and sore throat.    Neck: Negative for painful lymph nodes.   Cardiovascular: Negative for chest pain and leg swelling.   Eyes: Negative for double vision and blurred vision.   Respiratory: Negative for cough and shortness of breath.    Gastrointestinal: Negative for nausea, vomiting and diarrhea.   Genitourinary: Negative for dysuria, frequency, urgency and history of kidney stones.   Musculoskeletal: Positive for pain. Negative for joint pain, joint swelling, muscle cramps and muscle ache.   Skin: Negative for color change, pale, rash and bruising.   Allergic/Immunologic: Negative for seasonal allergies.   Neurological: Negative for dizziness, history of vertigo, light-headedness, passing out and headaches.   Hematologic/Lymphatic: Negative for swollen lymph nodes.   Psychiatric/Behavioral: Negative for nervous/anxious, sleep disturbance and depression. The patient is not " nervous/anxious.        Objective:      Physical Exam   Constitutional: She is oriented to person, place, and time. She appears well-developed and well-nourished. She is cooperative.  Non-toxic appearance. She does not appear ill. No distress.   HENT:   Head: Normocephalic and atraumatic.   Right Ear: Hearing, tympanic membrane, external ear and ear canal normal.   Left Ear: Hearing, tympanic membrane, external ear and ear canal normal.   Nose: Nose normal. No mucosal edema, rhinorrhea or nasal deformity. No epistaxis. Right sinus exhibits no maxillary sinus tenderness and no frontal sinus tenderness. Left sinus exhibits no maxillary sinus tenderness and no frontal sinus tenderness.   Mouth/Throat: Uvula is midline, oropharynx is clear and moist and mucous membranes are normal. No trismus in the jaw. Normal dentition. No uvula swelling. No posterior oropharyngeal erythema.   Eyes: Conjunctivae and lids are normal. Right eye exhibits no discharge. Left eye exhibits no discharge. No scleral icterus.   Sclera clear bilat   Neck: Trachea normal, normal range of motion, full passive range of motion without pain and phonation normal. Neck supple.   Cardiovascular: Normal rate, regular rhythm, normal heart sounds, intact distal pulses and normal pulses.   Pulmonary/Chest: Effort normal and breath sounds normal. No respiratory distress.   Abdominal: Soft. Normal appearance and bowel sounds are normal. She exhibits no distension, no pulsatile midline mass and no mass. There is no tenderness.   Musculoskeletal: She exhibits no edema or deformity.        Right shoulder: She exhibits decreased range of motion, tenderness and pain. She exhibits no bony tenderness, no swelling, no effusion, no crepitus, no deformity, no laceration, no spasm, normal pulse and normal strength.        Arms:  Neurological: She is alert and oriented to person, place, and time. She exhibits normal muscle tone. Coordination normal.   Skin: Skin is warm,  dry and intact. She is not diaphoretic. No pallor.   Psychiatric: She has a normal mood and affect. Her speech is normal and behavior is normal. Judgment and thought content normal. Cognition and memory are normal.   Nursing note and vitals reviewed.      Assessment:       1. Pain at injection site, initial encounter        Plan:         Pain at injection site, initial encounter    Other orders  -     methylPREDNISolone (MEDROL DOSEPACK) 4 mg tablet; Take 1 tablet (4 mg total) by mouth once daily. use as directed  Dispense: 1 Package; Refill: 0  -     naproxen (NAPROSYN) 500 MG tablet; Take 1 tablet (500 mg total) by mouth 2 (two) times daily with meals.  Dispense: 30 tablet; Refill: 0

## 2019-04-22 DIAGNOSIS — Z12.39 SCREENING BREAST EXAMINATION: Primary | ICD-10-CM

## 2019-04-23 ENCOUNTER — HOSPITAL ENCOUNTER (OUTPATIENT)
Dept: RADIOLOGY | Facility: HOSPITAL | Age: 79
Discharge: HOME | End: 2019-04-23
Attending: INTERNAL MEDICINE
Payer: MEDICARE

## 2019-04-23 DIAGNOSIS — Z12.39 SCREENING BREAST EXAMINATION: ICD-10-CM

## 2019-04-23 PROCEDURE — 77063 MAMMO DIGITAL SCREENING BILAT WITH TOMOSYNTHESIS_CAD: ICD-10-PCS | Mod: 26,,, | Performed by: RADIOLOGY

## 2019-04-23 PROCEDURE — 77067 MAMMO DIGITAL SCREENING BILAT WITH TOMOSYNTHESIS_CAD: ICD-10-PCS | Mod: 26,,, | Performed by: RADIOLOGY

## 2019-04-23 PROCEDURE — 77067 SCR MAMMO BI INCL CAD: CPT | Mod: TC

## 2019-04-23 PROCEDURE — 77063 BREAST TOMOSYNTHESIS BI: CPT | Mod: 26,,, | Performed by: RADIOLOGY

## 2019-04-23 PROCEDURE — 77067 SCR MAMMO BI INCL CAD: CPT | Mod: 26,,, | Performed by: RADIOLOGY

## 2019-11-25 ENCOUNTER — HOSPITAL ENCOUNTER (OUTPATIENT)
Facility: HOSPITAL | Age: 79
Discharge: HOME | End: 2019-11-26
Attending: EMERGENCY MEDICINE | Admitting: STUDENT IN AN ORGANIZED HEALTH CARE EDUCATION/TRAINING PROGRAM
Payer: MEDICARE

## 2019-11-25 ENCOUNTER — HOSPITAL ENCOUNTER (OUTPATIENT)
Dept: RADIOLOGY | Facility: HOSPITAL | Age: 79
Discharge: HOME | End: 2019-11-25
Attending: STUDENT IN AN ORGANIZED HEALTH CARE EDUCATION/TRAINING PROGRAM
Payer: MEDICARE

## 2019-11-25 ENCOUNTER — CLINICAL SUPPORT (OUTPATIENT)
Dept: CARDIOLOGY | Facility: HOSPITAL | Age: 79
End: 2019-11-25
Attending: STUDENT IN AN ORGANIZED HEALTH CARE EDUCATION/TRAINING PROGRAM
Payer: MEDICARE

## 2019-11-25 DIAGNOSIS — I20.0 UNSTABLE ANGINA: ICD-10-CM

## 2019-11-25 DIAGNOSIS — J41.0 SIMPLE CHRONIC BRONCHITIS: Primary | ICD-10-CM

## 2019-11-25 DIAGNOSIS — R07.9 CHEST PAIN: ICD-10-CM

## 2019-11-25 PROBLEM — K57.32 DIVERTICULITIS OF LARGE INTESTINE WITHOUT PERFORATION OR ABSCESS WITHOUT BLEEDING: Status: RESOLVED | Noted: 2017-07-28 | Resolved: 2019-11-25

## 2019-11-25 LAB
ALBUMIN SERPL BCP-MCNC: 4.1 G/DL (ref 3.5–5.2)
ALP SERPL-CCNC: 49 U/L (ref 55–135)
ALT SERPL W/O P-5'-P-CCNC: 17 U/L (ref 10–44)
ANION GAP SERPL CALC-SCNC: 12 MMOL/L (ref 8–16)
AST SERPL-CCNC: 20 U/L (ref 10–40)
BASOPHILS # BLD AUTO: 0.06 K/UL (ref 0–0.2)
BASOPHILS NFR BLD: 0.6 % (ref 0–1.9)
BILIRUB SERPL-MCNC: 0.9 MG/DL (ref 0.1–1)
BILIRUB UR QL STRIP: NEGATIVE
BNP SERPL-MCNC: 73 PG/ML (ref 0–99)
BUN SERPL-MCNC: 15 MG/DL (ref 8–23)
CALCIUM SERPL-MCNC: 8.7 MG/DL (ref 8.7–10.5)
CHLORIDE SERPL-SCNC: 99 MMOL/L (ref 95–110)
CHOLEST SERPL-MCNC: 145 MG/DL (ref 120–199)
CHOLEST/HDLC SERPL: 2 {RATIO} (ref 2–5)
CLARITY UR: CLEAR
CO2 SERPL-SCNC: 25 MMOL/L (ref 23–29)
COLOR UR: YELLOW
CREAT SERPL-MCNC: 0.5 MG/DL (ref 0.5–1.4)
CV PHARM DOSE: 0.4 MG
CV STRESS BASE HR: 95 BPM
DIASTOLIC BLOOD PRESSURE: 76 MMHG
DIFFERENTIAL METHOD: NORMAL
EOSINOPHIL # BLD AUTO: 0.2 K/UL (ref 0–0.5)
EOSINOPHIL NFR BLD: 2 % (ref 0–8)
ERYTHROCYTE [DISTWIDTH] IN BLOOD BY AUTOMATED COUNT: 13.6 % (ref 11.5–14.5)
EST. GFR  (AFRICAN AMERICAN): >60 ML/MIN/1.73 M^2
EST. GFR  (NON AFRICAN AMERICAN): >60 ML/MIN/1.73 M^2
GLUCOSE SERPL-MCNC: 127 MG/DL (ref 70–110)
GLUCOSE UR QL STRIP: NEGATIVE
HCT VFR BLD AUTO: 39.6 % (ref 37–48.5)
HDLC SERPL-MCNC: 72 MG/DL (ref 40–75)
HDLC SERPL: 49.7 % (ref 20–50)
HGB BLD-MCNC: 12.9 G/DL (ref 12–16)
HGB UR QL STRIP: NEGATIVE
IMM GRANULOCYTES # BLD AUTO: 0.03 K/UL (ref 0–0.04)
IMM GRANULOCYTES NFR BLD AUTO: 0.3 % (ref 0–0.5)
INR PPP: 1
INR PPP: 1.1
KETONES UR QL STRIP: ABNORMAL
LDLC SERPL CALC-MCNC: 66 MG/DL (ref 63–159)
LEUKOCYTE ESTERASE UR QL STRIP: NEGATIVE
LYMPHOCYTES # BLD AUTO: 3.4 K/UL (ref 1–4.8)
LYMPHOCYTES NFR BLD: 32.3 % (ref 18–48)
MAGNESIUM SERPL-MCNC: 2 MG/DL (ref 1.6–2.6)
MCH RBC QN AUTO: 30.2 PG (ref 27–31)
MCHC RBC AUTO-ENTMCNC: 32.6 G/DL (ref 32–36)
MCV RBC AUTO: 93 FL (ref 82–98)
MONOCYTES # BLD AUTO: 0.7 K/UL (ref 0.3–1)
MONOCYTES NFR BLD: 6.8 % (ref 4–15)
NEUTROPHILS # BLD AUTO: 6.2 K/UL (ref 1.8–7.7)
NEUTROPHILS NFR BLD: 58 % (ref 38–73)
NITRITE UR QL STRIP: NEGATIVE
NONHDLC SERPL-MCNC: 73 MG/DL
NRBC BLD-RTO: 0 /100 WBC
OHS CV CPX 85 PERCENT MAX PREDICTED HEART RATE MALE: 115
OHS CV CPX MAX PREDICTED HEART RATE: 135
OHS CV CPX PATIENT IS FEMALE: 1
OHS CV CPX PATIENT IS MALE: 0
OHS CV CPX PEAK DIASTOLIC BLOOD PRESSURE: 83 MMHG
OHS CV CPX PEAK HEAR RATE: 174 BPM
OHS CV CPX PEAK RATE PRESSURE PRODUCT: NORMAL
OHS CV CPX PEAK SYSTOLIC BLOOD PRESSURE: 150 MMHG
OHS CV CPX PERCENT MAX PREDICTED HEART RATE ACHIEVED: 129
OHS CV CPX RATE PRESSURE PRODUCT PRESENTING: NORMAL
PH UR STRIP: 6 [PH] (ref 5–8)
PLATELET # BLD AUTO: 228 K/UL (ref 150–350)
PMV BLD AUTO: 9.8 FL (ref 9.2–12.9)
POTASSIUM SERPL-SCNC: 3.9 MMOL/L (ref 3.5–5.1)
PROT SERPL-MCNC: 7.1 G/DL (ref 6–8.4)
PROT UR QL STRIP: ABNORMAL
PROTHROMBIN TIME: 12.7 SEC (ref 10.6–14.8)
PROTHROMBIN TIME: 14.1 SEC (ref 10.6–14.8)
RBC # BLD AUTO: 4.27 M/UL (ref 4–5.4)
SODIUM SERPL-SCNC: 136 MMOL/L (ref 136–145)
SP GR UR STRIP: >1.03 (ref 1–1.03)
STRESS ECHO TARGET HR: 118 BPM
SYSTOLIC BLOOD PRESSURE: 136 MMHG
TRIGL SERPL-MCNC: 35 MG/DL (ref 30–150)
TROPONIN I SERPL DL<=0.01 NG/ML-MCNC: <0.03 NG/ML (ref 0.02–0.04)
TSH SERPL DL<=0.005 MIU/L-ACNC: 0.45 UIU/ML (ref 0.34–5.6)
URN SPEC COLLECT METH UR: ABNORMAL
UROBILINOGEN UR STRIP-ACNC: NEGATIVE EU/DL
WBC # BLD AUTO: 10.59 K/UL (ref 3.9–12.7)

## 2019-11-25 PROCEDURE — 25500020 PHARM REV CODE 255: Performed by: EMERGENCY MEDICINE

## 2019-11-25 PROCEDURE — 25000003 PHARM REV CODE 250: Performed by: STUDENT IN AN ORGANIZED HEALTH CARE EDUCATION/TRAINING PROGRAM

## 2019-11-25 PROCEDURE — 83036 HEMOGLOBIN GLYCOSYLATED A1C: CPT

## 2019-11-25 PROCEDURE — 96375 TX/PRO/DX INJ NEW DRUG ADDON: CPT | Mod: 59

## 2019-11-25 PROCEDURE — 36415 COLL VENOUS BLD VENIPUNCTURE: CPT

## 2019-11-25 PROCEDURE — G0378 HOSPITAL OBSERVATION PER HR: HCPCS

## 2019-11-25 PROCEDURE — 96376 TX/PRO/DX INJ SAME DRUG ADON: CPT | Mod: 59

## 2019-11-25 PROCEDURE — 25000003 PHARM REV CODE 250: Performed by: EMERGENCY MEDICINE

## 2019-11-25 PROCEDURE — 63600175 PHARM REV CODE 636 W HCPCS: Performed by: STUDENT IN AN ORGANIZED HEALTH CARE EDUCATION/TRAINING PROGRAM

## 2019-11-25 PROCEDURE — 93017 CV STRESS TEST TRACING ONLY: CPT

## 2019-11-25 PROCEDURE — 80061 LIPID PANEL: CPT

## 2019-11-25 PROCEDURE — 93005 ELECTROCARDIOGRAM TRACING: CPT | Mod: 59

## 2019-11-25 PROCEDURE — 83880 ASSAY OF NATRIURETIC PEPTIDE: CPT

## 2019-11-25 PROCEDURE — 83735 ASSAY OF MAGNESIUM: CPT

## 2019-11-25 PROCEDURE — 84484 ASSAY OF TROPONIN QUANT: CPT

## 2019-11-25 PROCEDURE — 80053 COMPREHEN METABOLIC PANEL: CPT

## 2019-11-25 PROCEDURE — 99285 EMERGENCY DEPT VISIT HI MDM: CPT | Mod: 25

## 2019-11-25 PROCEDURE — 85025 COMPLETE CBC W/AUTO DIFF WBC: CPT

## 2019-11-25 PROCEDURE — 25000242 PHARM REV CODE 250 ALT 637 W/ HCPCS: Performed by: STUDENT IN AN ORGANIZED HEALTH CARE EDUCATION/TRAINING PROGRAM

## 2019-11-25 PROCEDURE — 84484 ASSAY OF TROPONIN QUANT: CPT | Mod: 91

## 2019-11-25 PROCEDURE — 96372 THER/PROPH/DIAG INJ SC/IM: CPT | Mod: 59

## 2019-11-25 PROCEDURE — 81003 URINALYSIS AUTO W/O SCOPE: CPT

## 2019-11-25 PROCEDURE — 63600175 PHARM REV CODE 636 W HCPCS: Performed by: EMERGENCY MEDICINE

## 2019-11-25 PROCEDURE — 94761 N-INVAS EAR/PLS OXIMETRY MLT: CPT

## 2019-11-25 PROCEDURE — 96374 THER/PROPH/DIAG INJ IV PUSH: CPT

## 2019-11-25 PROCEDURE — 85610 PROTHROMBIN TIME: CPT | Mod: 91

## 2019-11-25 PROCEDURE — 85610 PROTHROMBIN TIME: CPT

## 2019-11-25 PROCEDURE — 94640 AIRWAY INHALATION TREATMENT: CPT

## 2019-11-25 PROCEDURE — 96374 THER/PROPH/DIAG INJ IV PUSH: CPT | Mod: 59

## 2019-11-25 PROCEDURE — 84443 ASSAY THYROID STIM HORMONE: CPT

## 2019-11-25 PROCEDURE — 78452 HT MUSCLE IMAGE SPECT MULT: CPT | Mod: TC

## 2019-11-25 RX ORDER — SIMVASTATIN 20 MG/1
20 TABLET, FILM COATED ORAL NIGHTLY
Status: DISCONTINUED | OUTPATIENT
Start: 2019-11-25 | End: 2019-11-26 | Stop reason: HOSPADM

## 2019-11-25 RX ORDER — LANOLIN ALCOHOL/MO/W.PET/CERES
800 CREAM (GRAM) TOPICAL
Status: DISCONTINUED | OUTPATIENT
Start: 2019-11-25 | End: 2019-11-26 | Stop reason: HOSPADM

## 2019-11-25 RX ORDER — ENOXAPARIN SODIUM 100 MG/ML
40 INJECTION SUBCUTANEOUS EVERY 24 HOURS
Status: DISCONTINUED | OUTPATIENT
Start: 2019-11-25 | End: 2019-11-26 | Stop reason: HOSPADM

## 2019-11-25 RX ORDER — ACETAMINOPHEN 325 MG/1
650 TABLET ORAL EVERY 8 HOURS PRN
Status: DISCONTINUED | OUTPATIENT
Start: 2019-11-25 | End: 2019-11-26 | Stop reason: HOSPADM

## 2019-11-25 RX ORDER — SODIUM,POTASSIUM PHOSPHATES 280-250MG
2 POWDER IN PACKET (EA) ORAL
Status: DISCONTINUED | OUTPATIENT
Start: 2019-11-25 | End: 2019-11-26 | Stop reason: HOSPADM

## 2019-11-25 RX ORDER — NITROGLYCERIN 0.4 MG/1
0.4 TABLET SUBLINGUAL EVERY 5 MIN PRN
Status: DISCONTINUED | OUTPATIENT
Start: 2019-11-25 | End: 2019-11-26 | Stop reason: HOSPADM

## 2019-11-25 RX ORDER — POTASSIUM CHLORIDE 20 MEQ/15ML
40 SOLUTION ORAL
Status: DISCONTINUED | OUTPATIENT
Start: 2019-11-25 | End: 2019-11-26 | Stop reason: HOSPADM

## 2019-11-25 RX ORDER — SODIUM CHLORIDE 9 MG/ML
INJECTION, SOLUTION INTRAVENOUS CONTINUOUS
Status: DISCONTINUED | OUTPATIENT
Start: 2019-11-25 | End: 2019-11-26 | Stop reason: HOSPADM

## 2019-11-25 RX ORDER — METOPROLOL TARTRATE 1 MG/ML
5 INJECTION, SOLUTION INTRAVENOUS EVERY 5 MIN PRN
Status: DISCONTINUED | OUTPATIENT
Start: 2019-11-25 | End: 2019-11-26 | Stop reason: HOSPADM

## 2019-11-25 RX ORDER — SODIUM CHLORIDE 0.9 % (FLUSH) 0.9 %
10 SYRINGE (ML) INJECTION
Status: DISCONTINUED | OUTPATIENT
Start: 2019-11-25 | End: 2019-11-26 | Stop reason: HOSPADM

## 2019-11-25 RX ORDER — MORPHINE SULFATE 2 MG/ML
2 INJECTION, SOLUTION INTRAMUSCULAR; INTRAVENOUS EVERY 4 HOURS PRN
Status: DISCONTINUED | OUTPATIENT
Start: 2019-11-25 | End: 2019-11-26 | Stop reason: HOSPADM

## 2019-11-25 RX ORDER — AMOXICILLIN 250 MG
1 CAPSULE ORAL 2 TIMES DAILY
Status: DISCONTINUED | OUTPATIENT
Start: 2019-11-25 | End: 2019-11-26 | Stop reason: HOSPADM

## 2019-11-25 RX ORDER — IPRATROPIUM BROMIDE AND ALBUTEROL SULFATE 2.5; .5 MG/3ML; MG/3ML
3 SOLUTION RESPIRATORY (INHALATION) EVERY 4 HOURS
Status: DISCONTINUED | OUTPATIENT
Start: 2019-11-25 | End: 2019-11-25

## 2019-11-25 RX ORDER — ENALAPRIL MALEATE 10 MG/1
10 TABLET ORAL DAILY
Status: DISCONTINUED | OUTPATIENT
Start: 2019-11-25 | End: 2019-11-26 | Stop reason: HOSPADM

## 2019-11-25 RX ORDER — ACETAMINOPHEN 10 MG/ML
1000 INJECTION, SOLUTION INTRAVENOUS EVERY 8 HOURS
Status: DISCONTINUED | OUTPATIENT
Start: 2019-11-25 | End: 2019-11-25

## 2019-11-25 RX ORDER — ACETAMINOPHEN 10 MG/ML
1000 INJECTION, SOLUTION INTRAVENOUS EVERY 8 HOURS
Status: COMPLETED | OUTPATIENT
Start: 2019-11-25 | End: 2019-11-26

## 2019-11-25 RX ORDER — PREDNISONE 20 MG/1
40 TABLET ORAL DAILY
Status: DISCONTINUED | OUTPATIENT
Start: 2019-11-25 | End: 2019-11-26 | Stop reason: HOSPADM

## 2019-11-25 RX ORDER — REGADENOSON 0.08 MG/ML
0.4 INJECTION, SOLUTION INTRAVENOUS ONCE
Status: COMPLETED | OUTPATIENT
Start: 2019-11-25 | End: 2019-11-25

## 2019-11-25 RX ORDER — ASPIRIN 325 MG
325 TABLET ORAL
Status: COMPLETED | OUTPATIENT
Start: 2019-11-25 | End: 2019-11-25

## 2019-11-25 RX ORDER — ONDANSETRON 4 MG/1
8 TABLET, ORALLY DISINTEGRATING ORAL EVERY 8 HOURS PRN
Status: DISCONTINUED | OUTPATIENT
Start: 2019-11-25 | End: 2019-11-26 | Stop reason: HOSPADM

## 2019-11-25 RX ORDER — IPRATROPIUM BROMIDE AND ALBUTEROL SULFATE 2.5; .5 MG/3ML; MG/3ML
3 SOLUTION RESPIRATORY (INHALATION) EVERY 6 HOURS
Status: DISCONTINUED | OUTPATIENT
Start: 2019-11-26 | End: 2019-11-26 | Stop reason: HOSPADM

## 2019-11-25 RX ORDER — FLUTICASONE FUROATE AND VILANTEROL 100; 25 UG/1; UG/1
1 POWDER RESPIRATORY (INHALATION) DAILY
Status: DISCONTINUED | OUTPATIENT
Start: 2019-11-25 | End: 2019-11-26 | Stop reason: HOSPADM

## 2019-11-25 RX ORDER — ACETAMINOPHEN 325 MG/1
650 TABLET ORAL EVERY 4 HOURS PRN
Status: DISCONTINUED | OUTPATIENT
Start: 2019-11-25 | End: 2019-11-26 | Stop reason: HOSPADM

## 2019-11-25 RX ORDER — MONTELUKAST SODIUM 10 MG/1
10 TABLET ORAL DAILY
Status: DISCONTINUED | OUTPATIENT
Start: 2019-11-25 | End: 2019-11-26 | Stop reason: HOSPADM

## 2019-11-25 RX ADMIN — IPRATROPIUM BROMIDE AND ALBUTEROL SULFATE 3 ML: .5; 3 SOLUTION RESPIRATORY (INHALATION) at 07:11

## 2019-11-25 RX ADMIN — REGADENOSON 0.4 MG: 0.08 INJECTION, SOLUTION INTRAVENOUS at 12:11

## 2019-11-25 RX ADMIN — ENOXAPARIN SODIUM 40 MG: 100 INJECTION SUBCUTANEOUS at 04:11

## 2019-11-25 RX ADMIN — ENALAPRIL MALEATE 10 MG: 10 TABLET ORAL at 01:11

## 2019-11-25 RX ADMIN — ACETAMINOPHEN 1000 MG: 10 INJECTION, SOLUTION INTRAVENOUS at 09:11

## 2019-11-25 RX ADMIN — ACETAMINOPHEN 1000 MG: 10 INJECTION, SOLUTION INTRAVENOUS at 06:11

## 2019-11-25 RX ADMIN — IPRATROPIUM BROMIDE AND ALBUTEROL SULFATE 3 ML: .5; 3 SOLUTION RESPIRATORY (INHALATION) at 03:11

## 2019-11-25 RX ADMIN — ASPIRIN 325 MG ORAL TABLET 325 MG: 325 PILL ORAL at 09:11

## 2019-11-25 RX ADMIN — METOPROLOL TARTRATE 5 MG: 1 INJECTION, SOLUTION INTRAVENOUS at 12:11

## 2019-11-25 RX ADMIN — AZITHROMYCIN MONOHYDRATE 500 MG: 500 INJECTION, POWDER, LYOPHILIZED, FOR SOLUTION INTRAVENOUS at 05:11

## 2019-11-25 RX ADMIN — IOHEXOL 100 ML: 350 INJECTION, SOLUTION INTRAVENOUS at 09:11

## 2019-11-25 RX ADMIN — PREDNISONE 40 MG: 20 TABLET ORAL at 04:11

## 2019-11-25 RX ADMIN — SIMVASTATIN 20 MG: 20 TABLET, FILM COATED ORAL at 08:11

## 2019-11-25 RX ADMIN — SODIUM CHLORIDE: 0.9 INJECTION, SOLUTION INTRAVENOUS at 04:11

## 2019-11-25 RX ADMIN — MONTELUKAST SODIUM 10 MG: 10 TABLET, COATED ORAL at 01:11

## 2019-11-25 NOTE — SUBJECTIVE & OBJECTIVE
Past Medical History:   Diagnosis Date    Allergy     Arthritis     Asthma     Cataract     COPD (chronic obstructive pulmonary disease)     Diverticulitis     DJD (degenerative joint disease)     Hypertension     IGT (impaired glucose tolerance)     Other and unspecified hyperlipidemia     Urinary tract infection      Past Surgical History:   Procedure Laterality Date    APPENDECTOMY      CHOLECYSTECTOMY      EYE SURGERY      HYSTERECTOMY      TONSILLECTOMY, ADENOIDECTOMY       Social History     Tobacco Use    Smoking status: Former Smoker     Types: Cigarettes    Smokeless tobacco: Never Used   Substance Use Topics    Alcohol use: No    Drug use: No     Family History   Problem Relation Age of Onset    Asthma Mother     Heart failure Father      Review of patient's allergies indicates:   Allergen Reactions    Demerol [meperidine] Other (See Comments)     ams    Phenergan [promethazine] Other (See Comments)     ams     MEDICATIONS  Albuterol nebulizer  Albuterol rescue inhaler  Enalapril 10 mg q.day  Singulair 10 mg q.day  Zocor 20 mg q.h.s.    Review of Systems   Constitutional: Negative for chills and fever.   HENT: Negative for congestion, postnasal drip, rhinorrhea and sore throat.    Eyes: Negative for photophobia and itching.   Respiratory: Positive for cough and wheezing. Negative for chest tightness and shortness of breath.    Cardiovascular: Positive for chest pain. Negative for palpitations and leg swelling.   Gastrointestinal: Negative for abdominal pain, constipation, diarrhea, nausea and vomiting.   Genitourinary: Negative for dysuria, hematuria and urgency.   Musculoskeletal: Positive for back pain. Negative for arthralgias and myalgias.   Skin: Negative for rash.   Neurological: Negative for dizziness, weakness and headaches.   Psychiatric/Behavioral: Negative for behavioral problems. The patient is not nervous/anxious.         Objective:     Vital Signs (Most Recent):  Temp:  98.4 °F (36.9 °C) (11/25/19 1030)  Pulse: 82 (11/25/19 1030)  Resp: 17 (11/25/19 1030)  BP: 124/60 (11/25/19 1030)  SpO2: (!) 92 % (11/25/19 1030) Vital Signs (24h Range):  Temp:  [98.4 °F (36.9 °C)-98.6 °F (37 °C)] 98.4 °F (36.9 °C)  Pulse:  [] 82  Resp:  [16-24] 17  SpO2:  [90 %-95 %] 92 %  BP: (124-155)/(58-72) 124/60     Weight: 63.5 kg (140 lb)  Body mass index is 28.28 kg/m².  No intake or output data in the 24 hours ending 11/25/19 1155   Physical Exam   Constitutional: She is oriented to person, place, and time. She appears well-developed and well-nourished.   HENT:   Head: Atraumatic.   Eyes: Pupils are equal, round, and reactive to light. Conjunctivae and EOM are normal.   Neck: Normal range of motion. Neck supple. No JVD present. No thyromegaly present.   Cardiovascular: Normal rate, regular rhythm and normal heart sounds. Exam reveals no gallop and no friction rub.   No murmur heard.  Pulmonary/Chest: Effort normal and breath sounds normal. She has no wheezes. She has no rales.   Abdominal: Soft. Bowel sounds are normal. She exhibits no distension. There is no tenderness.   Musculoskeletal: Normal range of motion.   Neurological: She is alert and oriented to person, place, and time. She has normal reflexes.   Skin: Skin is warm and dry.   Psychiatric: She has a normal mood and affect. Her behavior is normal.   Nursing note and vitals reviewed.      Significant Labs:   LABS  CBC  Recent Labs   Lab 11/25/19  0612   WBC 10.59   RBC 4.27   HGB 12.9   HCT 39.6      MCV 93   MCH 30.2   MCHC 32.6     BMP  Recent Labs   Lab 11/25/19  0612      K 3.9   CO2 25   CL 99   BUN 15   CREATININE 0.5   *       Recent Labs   Lab 11/25/19  0612   CALCIUM 8.7   MG 2.0     LFT  Recent Labs   Lab 11/25/19  0612   PROT 7.1   ALBUMIN 4.1   BILITOT 0.9   AST 20   ALKPHOS 49*   ALT 17       COAGS  Recent Labs   Lab 11/25/19  0612   INR 1.0     CE  Recent Labs   Lab 11/25/19  0612 11/25/19  1027    TROPONINI <0.030 <0.030     BNP  Recent Labs   Lab 11/25/19  0612   BNP 73       LAST HbA1c  Lab Results   Component Value Date    HGBA1C 5.6 05/22/2018         Significant Imaging: I have reviewed all pertinent imaging results/findings within the past 24 hours.   Cxr:   Per radiology  FINDINGS:  The lungs are clear except for some vascular crowding bilaterally  likely related to a suboptimal inspiration. Costophrenic angles are seen without effusion. No pneumothorax is identified. The heart is normal in size. Atheromatous calcifications are seen at the aortic arch. Osseous structures show degenerative changes in the spine. The visualized upper abdomen is unremarkable.      Impression       No acute cardiac or pulmonary process.       CTA chest non coronary   Impression       1. No evidence of pulmonary thromboembolic disease.  2. Moderate atherosclerotic calcification involving the left main and left anterior descending coronary arteries.  3. Scattered foci of bilateral atelectasis.       EKG: Sinus rhythm with Premature atrial complexes Rate 99 QTc 462

## 2019-11-25 NOTE — PLAN OF CARE
11/25/19 1320   SEBASTIAN Message   Medicare Outpatient and Observation Notification regarding financial responsibility Given to patient/caregiver;Explained to patient/caregiver;Signed/date by patient/caregiver   Date SEBASTIAN was signed 11/25/19   Time SEBASTIAN was signed 1317     Introduced self and asked pt if ok to take few min to discuss Medicare form, and ok with pt.  Explained that pt in observation status and Medicare wants to inform them of SEBASTIAN form, pt verbalized an understanding to form, form signed and form scanned into CM notes.

## 2019-11-25 NOTE — ED NOTES
APPEARANCE: No acute distress.    NEURO: AAO x 3. Cooperative. Normal affect.   HEENT: No facial asymmetry. Pupils briskly reactive to light.   CARDIAC: Heart tones with normal rate and rhythm; no murmur heard.   PERIPHERAL VASCULAR: Radial and pedal pulses present. Cap refill less than 3 seconds. No edema.   RESPIRATORY: Respirations are equal and unlabored. Anterior and posterior bilateral breath sounds clear with mild expiratory wheeze noted to right lower lung field.  ABDOMEN: Soft, non-tender, non-distended, bowel sounds normoactive.  MUSCULOSKELETAL: Equal strength bilaterally.   SKIN: Warm, dry, and pink. Normal turgor. Mucous membranes moist.

## 2019-11-25 NOTE — HPI
80 yo WF with PMH of HTN, HLD, prior tobacco (quit age 48), COPD and OA presented to ED c/o chest pain since 4 am this morning. Pain described as severe with deep inhalation or cough and unchanged with rest or exertion.  Located in center of chest without radiation to arm or jaw.  Denies associated nausea, vomiting, diaphoresis.  Patient states she received nitro EN route to hospital with some relief of symptoms.  Additionally, patient states she has been using her nebulizer more frequently over last week for wheeze, shortness of breath and cough..  Denies fever, chills, nasal congestion.  Reported mid back under scapula on the right side yesterday.  States she applied Aspercreme with some relief.     In ER, BNP 73, troponin 0.03, chest x-ray no acute intrathoracic abnormalities, CTA chest negative for PE, positive for atherosclerotic changes in the left main and LAD.    Given cardiac risk factors: age, hypertension, hyperlipidemia, prior smoker, family history (father)  EDDIE: 2  Heart: 4

## 2019-11-25 NOTE — PROGRESS NOTES
Myocardial perfusion stress injection L hand at 12:45.  -Lexiscan stress  -Patient released from NPO status from NM stress test.  CECY Brandon

## 2019-11-25 NOTE — PROGRESS NOTES
Myocardial perfusion stress images in progress at 13:49.  -Myocardial perfusion stress study images will be complete at 13:59.  Abundio Bergman, CNMT

## 2019-11-25 NOTE — PLAN OF CARE
Stress test negative.  Pt c/o some pleuritic chest pain, wheeze, cough.  Will treat for COPD exacerbation with IV fluids, IV azithromycin and p.o. Prednisone, duo nebs  Plan for likely discharge in yanci Kerr D.O.  11/25/2019

## 2019-11-25 NOTE — ED PROVIDER NOTES
Encounter Date: 11/25/2019       History     Chief Complaint   Patient presents with    Shoulder Pain     Rt, since last night    Cough     Chief complaint is right scapular pain as well as midsternal pain. This patient states that at approximately 11-12 p.m. tonight she took 2 Tylenol for right shoulder blade pain.  She was able to  sleep and then woke up at 3 am. She then  developed right shoulder blade pain and midsternal pain that appeared to be more pleuritic in nature.  It also hurt in her right scapular when she moved.  The midsternal pain does not radiate to either arm.  She does state that the pain is pleuritic.  She denies any major shortness of breath. She denies vomiting or diarrhea.  She denies sweating.  The patient does have a history of hypertension and high cholesterol.  She does not have a history of MI diabetes and is a nonsmoker.  The patient states she has never had an angiogram.  EN route she was given albuterol treatment and Solu-Medrol.  The patient takes 3 medicines.  She takes simvastatin enalapril and singular.        Review of patient's allergies indicates:   Allergen Reactions    Demerol [meperidine]     Phenergan [promethazine]      Past Medical History:   Diagnosis Date    Allergy     Arthritis     Asthma     Cataract     COPD (chronic obstructive pulmonary disease)     Diverticulitis     DJD (degenerative joint disease)     Hypertension     IGT (impaired glucose tolerance)     Other and unspecified hyperlipidemia     Urinary tract infection      Past Surgical History:   Procedure Laterality Date    APPENDECTOMY      CHOLECYSTECTOMY      EYE SURGERY      HYSTERECTOMY      TONSILLECTOMY, ADENOIDECTOMY       Family History   Problem Relation Age of Onset    Asthma Mother     Heart failure Father      Social History     Tobacco Use    Smoking status: Former Smoker     Types: Cigarettes    Smokeless tobacco: Never Used   Substance Use Topics    Alcohol use: No     Drug use: No     Review of Systems   Constitutional: Negative for chills and fever.   HENT: Negative for ear pain, rhinorrhea and sore throat.    Eyes: Negative for pain and visual disturbance.   Respiratory: Negative for cough and shortness of breath.    Cardiovascular: Positive for chest pain. Negative for palpitations.   Gastrointestinal: Negative for abdominal pain, constipation, diarrhea, nausea and vomiting.   Genitourinary: Negative for dysuria, frequency, hematuria and urgency.   Musculoskeletal: Negative for back pain, joint swelling and myalgias.   Skin: Negative for rash.   Neurological: Negative for dizziness, seizures, weakness and headaches.   Psychiatric/Behavioral: Negative for dysphoric mood. The patient is not nervous/anxious.        Physical Exam     Initial Vitals   BP Pulse Resp Temp SpO2   11/25/19 0552 11/25/19 0549 11/25/19 0549 11/25/19 0549 11/25/19 0549   138/64 104 18 98.6 °F (37 °C) (!) 93 %      MAP       --                Physical Exam    Nursing note and vitals reviewed.  Constitutional: She appears well-developed and well-nourished.   HENT:   Head: Normocephalic and atraumatic.   Eyes: Conjunctivae, EOM and lids are normal. Pupils are equal, round, and reactive to light.   Neck: Trachea normal and normal range of motion. Neck supple. No thyroid mass and no thyromegaly present.   Cardiovascular: Normal rate, regular rhythm and normal heart sounds.   Pulmonary/Chest: Effort normal and breath sounds normal.   Abdominal: Soft. Normal appearance and bowel sounds are normal. There is no tenderness.   Musculoskeletal: Normal range of motion.   Neurological: She is alert and oriented to person, place, and time. She has normal strength and normal reflexes. No cranial nerve deficit or sensory deficit.   Skin: Skin is warm and dry.   Psychiatric: She has a normal mood and affect. Her speech is normal and behavior is normal. Judgment and thought content normal.         ED Course    Procedures  Labs Reviewed - No data to display  EKG Readings: (Independently Interpreted)   Rhythm: Normal Sinus Rhythm. Ectopy: No Ectopy. Conduction: Normal. ST Segments: Normal ST Segments. T Waves: Normal. Clinical Impression: Normal Sinus Rhythm       Imaging Results    None       X-Rays:   Independently Interpreted Readings:   Other Readings:  Chest x-ray unremarkable.  CT of the chest shows coronary calcifications.  No evidence of pulmonary emboli.    Medical Decision Making:   Differential Diagnosis:   79-year-old female presented emergency department with right scapular pain which started yesterday and this morning has pain in the substernal area which is worse with deep inspiration and movement.  Patient describes this pain as sharp pain.  Patient denies fever or chills or nausea or vomiting or abdominal pain.  Denies any focal weakness or numbness.  Screening cardiac workup unremarkable.  Given the nature of the pain CT of the chest done for further evaluation and no evidence of pulmonary emboli noted. Patient did have improvement of symptoms.  CT showed evidence of coronary calcifications.  Cardiac enzymes negative.  EKG unremarkable.  Given patient's age and coronary calcifications and presentation Hospital Medicine consulted for evaluation for admission and further management.  Aspirin given in the emergency department.  Clinical Tests:   Lab Tests: Reviewed  Radiological Study: Reviewed  Medical Tests: Reviewed              Attending Attestation:             Attending ED Notes:   The patient will be evaluated with a cardiac workup.  Care turned over to Dr. Willis. Carla Kumar MD  6:12 AM 11/25/2019                            Clinical Impression:       ICD-10-CM ICD-9-CM   1. Chest pain R07.9 786.50                             Oanh Willis MD  11/25/19 0949

## 2019-11-25 NOTE — H&P
[facility]  History & Physical    Patient Name: Teressa Benson  MRN: [id number]  Admission Date: 11/25/2019  Attending Physician: Dot Kerr DO   Primary Care Provider: Crow Bustos MD         Patient information was obtained from patient, caregiver / friend and EMR and ED physician (Dr. VANG).     Subjective:     Principal Problem:Chest pain    Chief Complaint:   Chief Complaint   Patient presents with    Shoulder Pain     Rt, since last night    Cough        HPI: 80 yo WF with PMH of HTN, HLD, prior tobacco (quit age 48), COPD and OA presented to ED c/o chest pain since 4 am this morning. Pain described as severe with deep inhalation or cough and unchanged with rest or exertion.  Located in center of chest without radiation to arm or jaw.  Denies associated nausea, vomiting, diaphoresis.  Patient states she received nitro EN route to hospital with some relief of symptoms.  Additionally, patient states she has been using her nebulizer more frequently over last week for wheeze, shortness of breath and cough..  Denies fever, chills, nasal congestion.  Reported mid back under scapula on the right side yesterday.  States she applied Aspercreme with some relief.     In ER, BNP 73, troponin 0.03, chest x-ray no acute intrathoracic abnormalities, CTA chest negative for PE, positive for atherosclerotic changes in the left main and LAD.    Given cardiac risk factors: age, hypertension, hyperlipidemia, prior smoker, family history (father)  EDDIE: 2  Heart: 4    Past Medical History:   Diagnosis Date    Allergy     Arthritis     Asthma     Cataract     COPD (chronic obstructive pulmonary disease)     Diverticulitis     DJD (degenerative joint disease)     Hypertension     IGT (impaired glucose tolerance)     Other and unspecified hyperlipidemia     Urinary tract infection      Past Surgical History:   Procedure Laterality Date    APPENDECTOMY      CHOLECYSTECTOMY       EYE SURGERY      HYSTERECTOMY      TONSILLECTOMY, ADENOIDECTOMY       Social History     Tobacco Use    Smoking status: Former Smoker     Types: Cigarettes    Smokeless tobacco: Never Used   Substance Use Topics    Alcohol use: No    Drug use: No     Family History   Problem Relation Age of Onset    Asthma Mother     Heart failure Father      Review of patient's allergies indicates:   Allergen Reactions    Demerol [meperidine] Other (See Comments)     ams    Phenergan [promethazine] Other (See Comments)     ams     MEDICATIONS  Albuterol nebulizer  Albuterol rescue inhaler  Enalapril 10 mg q.day  Singulair 10 mg q.day  Zocor 20 mg q.h.s.    Review of Systems   Constitutional: Negative for chills and fever.   HENT: Negative for congestion, postnasal drip, rhinorrhea and sore throat.    Eyes: Negative for photophobia and itching.   Respiratory: Positive for cough and wheezing. Negative for chest tightness and shortness of breath.    Cardiovascular: Positive for chest pain. Negative for palpitations and leg swelling.   Gastrointestinal: Negative for abdominal pain, constipation, diarrhea, nausea and vomiting.   Genitourinary: Negative for dysuria, hematuria and urgency.   Musculoskeletal: Positive for back pain. Negative for arthralgias and myalgias.   Skin: Negative for rash.   Neurological: Negative for dizziness, weakness and headaches.   Psychiatric/Behavioral: Negative for behavioral problems. The patient is not nervous/anxious.         Objective:     Vital Signs (Most Recent):  Temp: 98.4 °F (36.9 °C) (11/25/19 1030)  Pulse: 82 (11/25/19 1030)  Resp: 17 (11/25/19 1030)  BP: 124/60 (11/25/19 1030)  SpO2: (!) 92 % (11/25/19 1030) Vital Signs (24h Range):  Temp:  [98.4 °F (36.9 °C)-98.6 °F (37 °C)] 98.4 °F (36.9 °C)  Pulse:  [] 82  Resp:  [16-24] 17  SpO2:  [90 %-95 %] 92 %  BP: (124-155)/(58-72) 124/60     Weight: 63.5 kg (140 lb)  Body mass index is 28.28 kg/m².  No intake or output data in the  24 hours ending 11/25/19 1155   Physical Exam   Constitutional: She is oriented to person, place, and time. She appears well-developed and well-nourished.   HENT:   Head: Atraumatic.   Eyes: Pupils are equal, round, and reactive to light. Conjunctivae and EOM are normal.   Neck: Normal range of motion. Neck supple. No JVD present. No thyromegaly present.   Cardiovascular: Normal rate, regular rhythm and normal heart sounds. Exam reveals no gallop and no friction rub.   No murmur heard.  Pulmonary/Chest: Effort normal and breath sounds normal. She has no wheezes. She has no rales.   Abdominal: Soft. Bowel sounds are normal. She exhibits no distension. There is no tenderness.   Musculoskeletal: Normal range of motion.   Neurological: She is alert and oriented to person, place, and time. She has normal reflexes.   Skin: Skin is warm and dry.   Psychiatric: She has a normal mood and affect. Her behavior is normal.   Nursing note and vitals reviewed.      Significant Labs:   LABS  CBC  Recent Labs   Lab 11/25/19  0612   WBC 10.59   RBC 4.27   HGB 12.9   HCT 39.6      MCV 93   MCH 30.2   MCHC 32.6     BMP  Recent Labs   Lab 11/25/19  0612      K 3.9   CO2 25   CL 99   BUN 15   CREATININE 0.5   *       Recent Labs   Lab 11/25/19 0612   CALCIUM 8.7   MG 2.0     LFT  Recent Labs   Lab 11/25/19 0612   PROT 7.1   ALBUMIN 4.1   BILITOT 0.9   AST 20   ALKPHOS 49*   ALT 17       COAGS  Recent Labs   Lab 11/25/19  0612   INR 1.0     CE  Recent Labs   Lab 11/25/19  0612 11/25/19  1027   TROPONINI <0.030 <0.030     BNP  Recent Labs   Lab 11/25/19  0612   BNP 73       LAST HbA1c  Lab Results   Component Value Date    HGBA1C 5.6 05/22/2018         Significant Imaging: I have reviewed all pertinent imaging results/findings within the past 24 hours.   Cxr:   Per radiology  FINDINGS:  The lungs are clear except for some vascular crowding bilaterally  likely related to a suboptimal inspiration. Costophrenic angles  are seen without effusion. No pneumothorax is identified. The heart is normal in size. Atheromatous calcifications are seen at the aortic arch. Osseous structures show degenerative changes in the spine. The visualized upper abdomen is unremarkable.      Impression       No acute cardiac or pulmonary process.       CTA chest non coronary   Impression       1. No evidence of pulmonary thromboembolic disease.  2. Moderate atherosclerotic calcification involving the left main and left anterior descending coronary arteries.  3. Scattered foci of bilateral atelectasis.       EKG: Sinus rhythm with Premature atrial complexes Rate 99 QTc 462      Assessment/Plan:     * Chest pain  Placed in observation  Chest pain description pleuritic and likely from COPD, pleurisy, muscular skeletal however given Cardiac risk factors: htn, hld, prior smoker family hx will place in obs for chest pain rule out.   Chest x-ray no acute intrathoracic abnormality  CTA not coronary:  Negative for PE, positive arthrosclerotic changes current area arteries  Trop x 2 negative  will trend  Lexiscan ordered  Copd treatment as below          Mixed hyperlipidemia  Chronic medical problems  Continuing home medication  Lipid panel ordered      Hypertension  Chronic medical problems  Continuing home medications      COPD (chronic obstructive pulmonary disease)  Chronic medical condition  Duo treva Peña  Will consider steroids and azithromycin following stress test  Monitoring      IGT (impaired glucose tolerance)  Blood glucose mildly elevated on admission  A1c pending      DJD (degenerative joint disease)  Chronic medical condition  Stable        VTE Risk Mitigation (From admission, onward)         Ordered     enoxaparin injection 40 mg  Daily      11/25/19 1118     IP VTE HIGH RISK PATIENT  Once      11/25/19 1118                   Dot Kerr DO  Department of Hospital Medicine   [facility]

## 2019-11-25 NOTE — ASSESSMENT & PLAN NOTE
Chronic medical condition  Chet Peña  Will consider steroids and azithromycin following stress test  Monitoring

## 2019-11-25 NOTE — PROGRESS NOTES
Myocardial perfusion rest images in progress at 11:07.  -Patient to remain NPO status.  CECY Brandon

## 2019-11-25 NOTE — PROGRESS NOTES
Myocardial perfusion rest injection R arm IV at 10:38  -Patient to remain NPO status.  Abundio Bergman, CECY

## 2019-11-25 NOTE — ASSESSMENT & PLAN NOTE
Placed in observation  Chest pain description pleuritic and likely from COPD, pleurisy, muscular skeletal however given Cardiac risk factors: htn, hld, prior smoker family hx will place in obs for chest pain rule out.   Chest x-ray no acute intrathoracic abnormality  CTA not coronary:  Negative for PE, positive arthrosclerotic changes current area arteries  Trop x 2 negative  will trend  Lexiscan ordered  Copd treatment as below

## 2019-11-26 VITALS
WEIGHT: 141.13 LBS | OXYGEN SATURATION: 95 % | SYSTOLIC BLOOD PRESSURE: 117 MMHG | HEIGHT: 59 IN | TEMPERATURE: 98 F | HEART RATE: 64 BPM | BODY MASS INDEX: 28.45 KG/M2 | RESPIRATION RATE: 17 BRPM | DIASTOLIC BLOOD PRESSURE: 59 MMHG

## 2019-11-26 PROBLEM — R07.9 CHEST PAIN: Status: RESOLVED | Noted: 2019-11-25 | Resolved: 2019-11-26

## 2019-11-26 PROBLEM — R73.02 IGT (IMPAIRED GLUCOSE TOLERANCE): Status: RESOLVED | Noted: 2017-09-27 | Resolved: 2019-11-26

## 2019-11-26 LAB
ANION GAP SERPL CALC-SCNC: 7 MMOL/L (ref 8–16)
BASOPHILS # BLD AUTO: 0.02 K/UL (ref 0–0.2)
BASOPHILS NFR BLD: 0.2 % (ref 0–1.9)
BUN SERPL-MCNC: 12 MG/DL (ref 8–23)
CALCIUM SERPL-MCNC: 8.3 MG/DL (ref 8.7–10.5)
CHLORIDE SERPL-SCNC: 108 MMOL/L (ref 95–110)
CO2 SERPL-SCNC: 24 MMOL/L (ref 23–29)
CREAT SERPL-MCNC: 0.4 MG/DL (ref 0.5–1.4)
DIFFERENTIAL METHOD: ABNORMAL
EOSINOPHIL # BLD AUTO: 0 K/UL (ref 0–0.5)
EOSINOPHIL NFR BLD: 0 % (ref 0–8)
ERYTHROCYTE [DISTWIDTH] IN BLOOD BY AUTOMATED COUNT: 13.8 % (ref 11.5–14.5)
EST. GFR  (AFRICAN AMERICAN): >60 ML/MIN/1.73 M^2
EST. GFR  (NON AFRICAN AMERICAN): >60 ML/MIN/1.73 M^2
ESTIMATED AVG GLUCOSE: 120 MG/DL (ref 68–131)
GLUCOSE SERPL-MCNC: 118 MG/DL (ref 70–110)
GLUCOSE SERPL-MCNC: 126 MG/DL (ref 70–110)
HBA1C MFR BLD HPLC: 5.8 % (ref 4.5–6.2)
HCT VFR BLD AUTO: 35.4 % (ref 37–48.5)
HGB BLD-MCNC: 11.5 G/DL (ref 12–16)
IMM GRANULOCYTES # BLD AUTO: 0.04 K/UL (ref 0–0.04)
IMM GRANULOCYTES NFR BLD AUTO: 0.4 % (ref 0–0.5)
LYMPHOCYTES # BLD AUTO: 1.2 K/UL (ref 1–4.8)
LYMPHOCYTES NFR BLD: 11.4 % (ref 18–48)
MAGNESIUM SERPL-MCNC: 2.1 MG/DL (ref 1.6–2.6)
MCH RBC QN AUTO: 30.2 PG (ref 27–31)
MCHC RBC AUTO-ENTMCNC: 32.5 G/DL (ref 32–36)
MCV RBC AUTO: 93 FL (ref 82–98)
MONOCYTES # BLD AUTO: 0.7 K/UL (ref 0.3–1)
MONOCYTES NFR BLD: 6.6 % (ref 4–15)
NEUTROPHILS # BLD AUTO: 8.7 K/UL (ref 1.8–7.7)
NEUTROPHILS NFR BLD: 81.4 % (ref 38–73)
NRBC BLD-RTO: 0 /100 WBC
PHOSPHATE SERPL-MCNC: 2.6 MG/DL (ref 2.7–4.5)
PLATELET # BLD AUTO: 225 K/UL (ref 150–350)
PMV BLD AUTO: 9.4 FL (ref 9.2–12.9)
POTASSIUM SERPL-SCNC: 3.7 MMOL/L (ref 3.5–5.1)
RBC # BLD AUTO: 3.81 M/UL (ref 4–5.4)
SODIUM SERPL-SCNC: 139 MMOL/L (ref 136–145)
WBC # BLD AUTO: 10.65 K/UL (ref 3.9–12.7)

## 2019-11-26 PROCEDURE — 94640 AIRWAY INHALATION TREATMENT: CPT

## 2019-11-26 PROCEDURE — 84100 ASSAY OF PHOSPHORUS: CPT

## 2019-11-26 PROCEDURE — 80048 BASIC METABOLIC PNL TOTAL CA: CPT

## 2019-11-26 PROCEDURE — 99900035 HC TECH TIME PER 15 MIN (STAT)

## 2019-11-26 PROCEDURE — 83735 ASSAY OF MAGNESIUM: CPT

## 2019-11-26 PROCEDURE — 96361 HYDRATE IV INFUSION ADD-ON: CPT

## 2019-11-26 PROCEDURE — G0378 HOSPITAL OBSERVATION PER HR: HCPCS

## 2019-11-26 PROCEDURE — 63600175 PHARM REV CODE 636 W HCPCS: Performed by: STUDENT IN AN ORGANIZED HEALTH CARE EDUCATION/TRAINING PROGRAM

## 2019-11-26 PROCEDURE — 25000003 PHARM REV CODE 250: Performed by: STUDENT IN AN ORGANIZED HEALTH CARE EDUCATION/TRAINING PROGRAM

## 2019-11-26 PROCEDURE — 94761 N-INVAS EAR/PLS OXIMETRY MLT: CPT

## 2019-11-26 PROCEDURE — 85025 COMPLETE CBC W/AUTO DIFF WBC: CPT

## 2019-11-26 PROCEDURE — 36415 COLL VENOUS BLD VENIPUNCTURE: CPT

## 2019-11-26 PROCEDURE — 96376 TX/PRO/DX INJ SAME DRUG ADON: CPT

## 2019-11-26 PROCEDURE — 25000242 PHARM REV CODE 250 ALT 637 W/ HCPCS: Performed by: STUDENT IN AN ORGANIZED HEALTH CARE EDUCATION/TRAINING PROGRAM

## 2019-11-26 RX ORDER — PREDNISONE 20 MG/1
40 TABLET ORAL DAILY
Qty: 8 TABLET | Refills: 0 | OUTPATIENT
Start: 2019-11-26 | End: 2019-11-26 | Stop reason: SDUPTHER

## 2019-11-26 RX ORDER — FLUTICASONE FUROATE AND VILANTEROL 100; 25 UG/1; UG/1
1 POWDER RESPIRATORY (INHALATION) DAILY
Qty: 1 EACH | Refills: 3 | OUTPATIENT
Start: 2019-11-27 | End: 2019-11-26

## 2019-11-26 RX ORDER — AZITHROMYCIN 250 MG/1
500 TABLET, FILM COATED ORAL DAILY
Qty: 2 TABLET | Refills: 0 | OUTPATIENT
Start: 2019-11-26 | End: 2019-11-26 | Stop reason: SDUPTHER

## 2019-11-26 RX ORDER — AZITHROMYCIN 250 MG/1
500 TABLET, FILM COATED ORAL DAILY
Qty: 2 TABLET | Refills: 0 | Status: SHIPPED | OUTPATIENT
Start: 2019-11-26 | End: 2019-11-27

## 2019-11-26 RX ORDER — PREDNISONE 20 MG/1
40 TABLET ORAL DAILY
Qty: 8 TABLET | Refills: 0 | Status: SHIPPED | OUTPATIENT
Start: 2019-11-26 | End: 2019-11-30

## 2019-11-26 RX ORDER — FLUTICASONE FUROATE AND VILANTEROL 100; 25 UG/1; UG/1
1 POWDER RESPIRATORY (INHALATION) DAILY
Qty: 1 EACH | Refills: 3 | Status: SHIPPED | OUTPATIENT
Start: 2019-11-27 | End: 2021-11-05

## 2019-11-26 RX ADMIN — ACETAMINOPHEN 1000 MG: 10 INJECTION, SOLUTION INTRAVENOUS at 05:11

## 2019-11-26 RX ADMIN — FLUTICASONE FUROATE AND VILANTEROL TRIFENATATE 1 PUFF: 100; 25 POWDER RESPIRATORY (INHALATION) at 07:11

## 2019-11-26 RX ADMIN — IPRATROPIUM BROMIDE AND ALBUTEROL SULFATE 3 ML: .5; 3 SOLUTION RESPIRATORY (INHALATION) at 07:11

## 2019-11-26 RX ADMIN — IPRATROPIUM BROMIDE AND ALBUTEROL SULFATE 3 ML: .5; 3 SOLUTION RESPIRATORY (INHALATION) at 01:11

## 2019-11-26 RX ADMIN — SODIUM CHLORIDE: 0.9 INJECTION, SOLUTION INTRAVENOUS at 05:11

## 2019-11-26 RX ADMIN — MONTELUKAST SODIUM 10 MG: 10 TABLET, COATED ORAL at 08:11

## 2019-11-26 RX ADMIN — IPRATROPIUM BROMIDE AND ALBUTEROL SULFATE 3 ML: .5; 3 SOLUTION RESPIRATORY (INHALATION) at 12:11

## 2019-11-26 RX ADMIN — PREDNISONE 40 MG: 20 TABLET ORAL at 08:11

## 2019-11-26 NOTE — NURSING
Pt denies any CP or SOB. Stable, no acute distress. Wheelchaired pt off unit. Daughter to drive pt home in private vehicle.

## 2019-11-26 NOTE — DISCHARGE SUMMARY
[facility]  Discharge Summary      Patient Name: Teressa Benson  MRN: [id number]  Admission Date: 11/25/2019  Hospital Length of Stay: 0 days  Discharge Date and Time:  11/26/2019 1:23 PM  Attending Physician: Juvenal Bartholomew MD   Discharging Provider: Juvenal Bartholomew MD  Primary Care Provider: Crow Bustos MD      HPI:   80 yo WF with PMH of HTN, HLD, prior tobacco (quit age 48), COPD and OA presented to ED c/o chest pain since 4 am this morning. Pain described as severe with deep inhalation or cough and unchanged with rest or exertion.  Located in center of chest without radiation to arm or jaw.  Denies associated nausea, vomiting, diaphoresis.  Patient states she received nitro EN route to hospital with some relief of symptoms.  Additionally, patient states she has been using her nebulizer more frequently over last week for wheeze, shortness of breath and cough..  Denies fever, chills, nasal congestion.  Reported mid back under scapula on the right side yesterday.  States she applied Aspercreme with some relief.     In ER, BNP 73, troponin 0.03, chest x-ray no acute intrathoracic abnormalities, CTA chest negative for PE, positive for atherosclerotic changes in the left main and LAD.    Given cardiac risk factors: age, hypertension, hyperlipidemia, prior smoker, family history (father)  EDDIE: 2  Heart: 4    * No surgery found *      Hospital Course:   Patient was admitted for chest pain.Patient was observed in the hospital and monitored with EKG and serial troponin. They remained negative and patient did not complain of chest pain during his stay. Patient then had stress test which did not show any reversible ischemia. Patient was treated with antiobiotic and steroids for suspected bronchitis/copd. Patient significantly improved overnight. The patient was discharged in the care of family with discharge instructions were reviewed in written and verbal form. All  pertinent questions were discussed and prescriptions were provided. The patient will follow up in 1 week or sooner as needed with the PCP discussed, and the patient is on board with the plan.    Vitals:    11/26/19 0600 11/26/19 0720 11/26/19 0805 11/26/19 1100   BP:   (!) 107/53 (!) 117/59   BP Location:       Patient Position:       Pulse:  64 82 61   Resp:  (!) 22 18 18   Temp:   98 °F (36.7 °C) 97.8 °F (36.6 °C)   TempSrc:   Oral Oral   SpO2:  95% (!) 93% (!) 93%   Weight: 64 kg (141 lb 1.5 oz)      Height:         Vitals reviewed.  Constitutional: No distress.   HENT:   Head: Atraumatic.   Cardiovascular: Normal rate, regular rhythm and normal heart sounds.   Pulmonary/Chest: Effort normal. She has no wheezes.   Abdominal: Soft. Bowel sounds are normal. She exhibits no distension and no mass. No tenderness  Neurological: Alert.   Skin: Skin is warm and dry.     Stress test result:      1.   No evidence of pharmacologically induced reversible ischemia or infarct.    2.   Normal left ventricular wall motion.    3.   Calculated ejection fraction of 74 %.      Electronically signed by: Az Montanez MD  Date: 11/25/2019  Time: 14:12    Encounter     View            Consults:   Consults (From admission, onward)        Status Ordering Provider     Inpatient consult to Hospitalist  Once     Provider:  DO Natalie Coombs PENNY A.          No new Assessment & Plan notes have been filed under this hospital service since the last note was generated.  Service: Hospital Medicine    Final Active Diagnoses:    Diagnosis Date Noted POA    Mixed hyperlipidemia [E78.2] 05/17/2018 Yes    COPD (chronic obstructive pulmonary disease) [J44.9] 09/27/2017 Yes    Hypertension [I10] 09/27/2017 Yes    DJD (degenerative joint disease) [M19.90] 07/28/2017 Yes      Problems Resolved During this Admission:    Diagnosis Date Noted Date Resolved POA    PRINCIPAL PROBLEM:  Chest pain [R07.9] 11/25/2019 11/26/2019 Yes     IGT (impaired glucose tolerance) [R73.02] 09/27/2017 11/26/2019 Yes       Discharged Condition: Stable    Disposition: Home or Self Care    Follow Up:  Follow-up Information     Crow Bustos MD In 1 week.    Specialty:  Internal Medicine  Why:  As needed  Contact information:  [address] [place] [address] Dr Tavon CARLSON [id number]  [phone]                 Patient Instructions:      Diet Adult Regular     Notify your health care provider if you experience any of the following:  temperature >100.4     Notify your health care provider if you experience any of the following:  persistent nausea and vomiting or diarrhea     Notify your health care provider if you experience any of the following:  severe uncontrolled pain     Notify your health care provider if you experience any of the following:  redness, tenderness, or signs of infection (pain, swelling, redness, odor or green/yellow discharge around incision site)     Notify your health care provider if you experience any of the following:  difficulty breathing or increased cough     Notify your health care provider if you experience any of the following:  severe persistent headache     Notify your health care provider if you experience any of the following:  worsening rash     Notify your health care provider if you experience any of the following:  persistent dizziness, light-headedness, or visual disturbances     Notify your health care provider if you experience any of the following:  increased confusion or weakness     No dressing needed     Activity as tolerated       Significant Diagnostic Studies: All reviewed    Pending Diagnostic Studies:     None         Medications:  Reconciled Home Medications:      Medication List      START taking these medications    azithromycin 250 MG tablet  Commonly known as:  Z-SANTOS  Take 2 tablets (500 mg total) by mouth once daily. for 1 day     fluticasone furoate-vilanterol 100-25 mcg/dose diskus inhaler  Commonly  known as:  BREO  Inhale 1 puff into the lungs once daily. Controller  Start taking on:  November 27, 2019     predniSONE 20 MG tablet  Commonly known as:  DELTASONE  Take 2 tablets (40 mg total) by mouth once daily. for 4 days        CONTINUE taking these medications    * albuterol 2.5 mg /3 mL (0.083 %) nebulizer solution  Commonly known as:  PROVENTIL  INHALE ONE VIAL VIA NEBULIZER EVERY FOUR HOURS AS NEEDED     * albuterol 90 mcg/actuation inhaler  Commonly known as:  PROVENTIL/VENTOLIN HFA  Inhale 2 puffs into the lungs every 6 (six) hours as needed for Wheezing. Rescue  every four to six hours as needed     enalapril 10 MG tablet  Commonly known as:  VASOTEC  TAKE ONE TABLET BY MOUTH ONCE DAILY     montelukast 10 mg tablet  Commonly known as:  SINGULAIR  TAKE ONE TABLET BY MOUTH ONCE DAILY     simvastatin 20 MG tablet  Commonly known as:  ZOCOR  TAKE ONE TABLET BY MOUTH EVERY EVENING.         * This list has 2 medication(s) that are the same as other medications prescribed for you. Read the directions carefully, and ask your doctor or other care provider to review them with you.                Indwelling Lines/Drains at time of discharge:   Lines/Drains/Airways     None                 Time spent on the discharge of patient: 30 minutes  Patient was seen and examined on the date of discharge and determined to be suitable for discharge.         Juvenal Bartholomew MD  Department of Hospital Medicine  [facility]

## 2019-11-26 NOTE — PLAN OF CARE
11/25/19 1923   Patient Assessment/Suction   Level of Consciousness (AVPU) alert   Respiratory Effort Normal;Unlabored   Expansion/Accessory Muscles/Retractions no use of accessory muscles   All Lung Fields Breath Sounds diminished;clear   PRE-TX-O2   O2 Device (Oxygen Therapy) room air   SpO2 98 %   Pulse Oximetry Type Intermittent   Pulse 88   Resp 16   Aerosol Therapy   $ Aerosol Therapy Charges Aerosol Treatment   Respiratory Treatment Status (SVN) given   Treatment Route (SVN) mouthpiece   Patient Position (SVN) HOB elevated   Post Treatment Assessment (SVN) breath sounds improved   Signs of Intolerance (SVN) none   Breath Sounds Post-Respiratory Treatment   Throughout All Fields Post-Treatment All Fields   Throughout All Fields Post-Treatment clear   Post-treatment Heart Rate (beats/min) 86   Post-treatment Resp Rate (breaths/min) 18

## 2019-11-26 NOTE — DISCHARGE SUMMARY
Discharge instructions and printed prescriptions given and explained. Pt verbalized understanding.

## 2019-11-26 NOTE — CARE UPDATE
11/26/19 0720   Patient Assessment/Suction   Level of Consciousness (AVPU) alert   Respiratory Effort Normal   All Lung Fields Breath Sounds wheezes, expiratory   Cough Frequency infrequent   Cough Type nonproductive   PRE-TX-O2   O2 Device (Oxygen Therapy) room air   SpO2 95 %   Pulse Oximetry Type Intermittent   $ Pulse Oximetry - Multiple Charge Pulse Oximetry - Multiple   Pulse 64   Resp (!) 22   Aerosol Therapy   $ Aerosol Therapy Charges Aerosol Treatment   Daily Review of Necessity (SVN) completed   Respiratory Treatment Status (SVN) given   Treatment Route (SVN) mask;mouthpiece   Patient Position (SVN) Kan's   Post Treatment Assessment (SVN) breath sounds improved   Signs of Intolerance (SVN) none   Inhaler   $ Inhaler Charges MDI (Metered Dose Inahler) Treatment;Mouth rinsed post treatment;On hold   Daily Review of Necessity (Inhaler) completed   Respiratory Treatment Status (Inhaler) given   Treatment Route (Inhaler) mouthpiece   Patient Position (Inhaler) Kan's   Post Treatment Assessment (Inhaler) breath sounds improved   Signs of Intolerance (Inhaler) none   Breath Sounds Post-Respiratory Treatment   Throughout All Fields Post-Treatment All Fields   Throughout All Fields Post-Treatment aeration increased   Post-treatment Heart Rate (beats/min) 68   Post-treatment Resp Rate (breaths/min) 18

## 2019-11-26 NOTE — DISCHARGE INSTRUCTIONS
1. Please call your PCP to schedule appointment for 7 days follow up visit.  2. Please take azithromycin antibiotics for next 2 days  3. Please take prednisone steroid for next 4 days  4. Please take inhalers as directed.

## 2020-07-13 DIAGNOSIS — Z12.31 ENCOUNTER FOR SCREENING MAMMOGRAM FOR MALIGNANT NEOPLASM OF BREAST: Primary | ICD-10-CM

## 2020-07-21 ENCOUNTER — HOSPITAL ENCOUNTER (OUTPATIENT)
Dept: RADIOLOGY | Facility: HOSPITAL | Age: 80
Discharge: HOME | End: 2020-07-21
Attending: INTERNAL MEDICINE
Payer: MEDICARE

## 2020-07-21 DIAGNOSIS — Z12.31 ENCOUNTER FOR SCREENING MAMMOGRAM FOR MALIGNANT NEOPLASM OF BREAST: ICD-10-CM

## 2020-07-21 PROCEDURE — 77067 SCR MAMMO BI INCL CAD: CPT | Mod: TC,PO

## 2020-08-19 ENCOUNTER — OFFICE VISIT (OUTPATIENT)
Dept: DERMATOLOGY | Facility: CLINIC | Age: 80
End: 2020-08-19
Payer: MEDICARE

## 2020-08-19 DIAGNOSIS — D49.9 NEOPLASM: Primary | ICD-10-CM

## 2020-08-19 PROCEDURE — 11102 TANGNTL BX SKIN SINGLE LES: CPT | Mod: S$GLB,,, | Performed by: DERMATOLOGY

## 2020-08-19 PROCEDURE — 88305 TISSUE EXAM BY PATHOLOGIST: CPT | Performed by: PATHOLOGY

## 2020-08-19 PROCEDURE — 11102 PR TANGENTIAL BIOPSY, SKIN, SINGLE LESION: ICD-10-PCS | Mod: S$GLB,,, | Performed by: DERMATOLOGY

## 2020-08-19 PROCEDURE — 99499 UNLISTED E&M SERVICE: CPT | Mod: S$GLB,,, | Performed by: DERMATOLOGY

## 2020-08-19 PROCEDURE — 99999 PR PBB SHADOW E&M-EST. PATIENT-LVL II: CPT | Mod: PBBFAC,,, | Performed by: DERMATOLOGY

## 2020-08-19 PROCEDURE — 99999 PR PBB SHADOW E&M-EST. PATIENT-LVL II: ICD-10-PCS | Mod: PBBFAC,,, | Performed by: DERMATOLOGY

## 2020-08-19 PROCEDURE — 88305 TISSUE EXAM BY PATHOLOGIST: CPT | Mod: 26,,, | Performed by: PATHOLOGY

## 2020-08-19 PROCEDURE — 99499 NO LOS: ICD-10-PCS | Mod: S$GLB,,, | Performed by: DERMATOLOGY

## 2020-08-19 PROCEDURE — 88305 TISSUE EXAM BY PATHOLOGIST: ICD-10-PCS | Mod: 26,,, | Performed by: PATHOLOGY

## 2020-08-19 NOTE — PROGRESS NOTES
Subjective:       Patient ID:  Teressa Benson is a 80 y.o. female who presents for No chief complaint on file.    Spot for a month or so.  Pt trying to treat with otc wart remover.  +pain.      Review of Systems     Objective:    Physical Exam   Skin:                 Diagram Legend     Erythematous scaling macule/papule c/w actinic keratosis       Vascular papule c/w angioma      Pigmented verrucoid papule/plaque c/w seborrheic keratosis      Yellow umbilicated papule c/w sebaceous hyperplasia      Irregularly shaped tan macule c/w lentigo     1-2 mm smooth white papules consistent with Milia      Movable subcutaneous cyst with punctum c/w epidermal inclusion cyst      Subcutaneous movable cyst c/w pilar cyst      Firm pink to brown papule c/w dermatofibroma      Pedunculated fleshy papule(s) c/w skin tag(s)      Evenly pigmented macule c/w junctional nevus     Mildly variegated pigmented, slightly irregular-bordered macule c/w mildly atypical nevus      Flesh colored to evenly pigmented papule c/w intradermal nevus       Pink pearly papule/plaque c/w basal cell carcinoma      Erythematous hyperkeratotic cursted plaque c/w SCC      Surgical scar with no sign of skin cancer recurrence      Open and closed comedones      Inflammatory papules and pustules      Verrucoid papule consistent consistent with wart     Erythematous eczematous patches and plaques     Dystrophic onycholytic nail with subungual debris c/w onychomycosis     Umbilicated papule    Erythematous-base heme-crusted tan verrucoid plaque consistent with inflamed seborrheic keratosis     Erythematous Silvery Scaling Plaque c/w Psoriasis     See annotation          Assessment / Plan:      Pathology Orders:     Normal Orders This Visit    Specimen to Pathology, Dermatology     Questions:    Procedure Type: Dermatology and skin neoplasms    Number of Specimens: 1    ------------------------: -------------------------    Spec 1 Procedure: Biopsy    Spec 1  Clinical Impression: wart vs pyogenic granuloma vs traumatized sk (pt tried to waggoner with wart solution)    Spec 1 Source: l inner thigh        Neoplasm  -     Specimen to Pathology, Dermatology    Shave biopsy procedure note:    Shave biopsy performed after verbal consent including risk of infection, scar, recurrence, need for additional treatment of site. Area prepped with alcohol, anesthetized with approximately 1.0cc of 1% lidocaine with epinephrine. Lesional tissue shaved with razor blade. Hemostasis achieved with application of aluminum chloride followed by hyfrecation. No complications. Dressing applied. Wound care explained.             Follow up if symptoms worsen or fail to improve.

## 2020-08-19 NOTE — PATIENT INSTRUCTIONS

## 2020-08-24 ENCOUNTER — PROCEDURE VISIT (OUTPATIENT)
Dept: DERMATOLOGY | Facility: CLINIC | Age: 80
End: 2020-08-24
Payer: MEDICARE

## 2020-08-24 VITALS — BODY MASS INDEX: 28.5 KG/M2 | WEIGHT: 141.13 LBS

## 2020-08-24 DIAGNOSIS — D04.72 SQUAMOUS CELL CARCINOMA IN SITU (SCCIS) OF SKIN OF LEFT LOWER LEG: Primary | ICD-10-CM

## 2020-08-24 LAB
FINAL PATHOLOGIC DIAGNOSIS: NORMAL
GROSS: NORMAL

## 2020-08-24 PROCEDURE — 17262 DSTRJ MAL LES T/A/L 1.1-2.0: CPT | Mod: S$GLB,,, | Performed by: DERMATOLOGY

## 2020-08-24 PROCEDURE — 17262 PR DESTR MALIG TRUNK,EXTREM 1.1-2 CM: ICD-10-PCS | Mod: S$GLB,,, | Performed by: DERMATOLOGY

## 2020-08-24 NOTE — PROGRESS NOTES
Subjective:       Patient ID:  Teressa Benson is a 80 y.o. female who presents for   Chief Complaint   Patient presents with    Squamous Cell Carcinoma     removal to left inner thigh     Patient returned today for SCC removal to left inner thigh.      Review of Systems   Constitutional: Negative for fever, chills and fatigue.   HENT: Negative for congestion and sore throat.    Respiratory: Negative for cough and shortness of breath.    Gastrointestinal: Negative for nausea, vomiting, diarrhea and constipation.   Skin: Positive for daily sunscreen use, activity-related sunscreen use and wears hat.        Objective:    Physical Exam   Constitutional: She appears well-developed and well-nourished.   Neurological: She is alert and oriented to person, place, and time.   Psychiatric: She has a normal mood and affect.          Diagram Legend     Erythematous scaling macule/papule c/w actinic keratosis       Vascular papule c/w angioma      Pigmented verrucoid papule/plaque c/w seborrheic keratosis      Yellow umbilicated papule c/w sebaceous hyperplasia      Irregularly shaped tan macule c/w lentigo     1-2 mm smooth white papules consistent with Milia      Movable subcutaneous cyst with punctum c/w epidermal inclusion cyst      Subcutaneous movable cyst c/w pilar cyst      Firm pink to brown papule c/w dermatofibroma      Pedunculated fleshy papule(s) c/w skin tag(s)      Evenly pigmented macule c/w junctional nevus     Mildly variegated pigmented, slightly irregular-bordered macule c/w mildly atypical nevus      Flesh colored to evenly pigmented papule c/w intradermal nevus       Pink pearly papule/plaque c/w basal cell carcinoma      Erythematous hyperkeratotic cursted plaque c/w SCC      Surgical scar with no sign of skin cancer recurrence      Open and closed comedones      Inflammatory papules and pustules      Verrucoid papule consistent consistent with wart     Erythematous eczematous patches and plaques      Dystrophic onycholytic nail with subungual debris c/w onychomycosis     Umbilicated papule    Erythematous-base heme-crusted tan verrucoid plaque consistent with inflamed seborrheic keratosis     Erythematous Silvery Scaling Plaque c/w Psoriasis     See annotation      Assessment / Plan:        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    Reviewed with patient different treatment options and associated risks.  Here for electrodesiccation and curettage of Superficial scc on the l thigh. bx done on before:      Electrodessication and Curettage Procedure note:    Verbal consent obtained. Lesional tissue marked and prepped with alcohol. Lesion anesthetized with 1% lidocaine with epinephrine. Curettage and Desiccation x 3 cycles to base. Aluminum chloride for hemostasis. Lesion size after primary curettage: 1.2 cm    Area bandaged and wound care explained.    F/u 3 months           Follow up in about 3 months (around 11/24/2020).

## 2020-08-24 NOTE — PATIENT INSTRUCTIONS

## 2020-12-29 ENCOUNTER — LAB VISIT (OUTPATIENT)
Dept: LAB | Facility: HOSPITAL | Age: 80
End: 2020-12-29
Attending: INTERNAL MEDICINE
Payer: MEDICARE

## 2020-12-29 DIAGNOSIS — I10 ESSENTIAL HYPERTENSION, MALIGNANT: Primary | ICD-10-CM

## 2020-12-29 DIAGNOSIS — E78.5 HYPERLIPEMIA: ICD-10-CM

## 2020-12-29 LAB
ALBUMIN SERPL BCP-MCNC: 3.7 G/DL (ref 3.5–5.2)
ALP SERPL-CCNC: 56 U/L (ref 55–135)
ALT SERPL W/O P-5'-P-CCNC: 15 U/L (ref 10–44)
ANION GAP SERPL CALC-SCNC: 10 MMOL/L (ref 8–16)
AST SERPL-CCNC: 17 U/L (ref 10–40)
BASOPHILS # BLD AUTO: 0.06 K/UL (ref 0–0.2)
BASOPHILS NFR BLD: 0.9 % (ref 0–1.9)
BILIRUB SERPL-MCNC: 0.6 MG/DL (ref 0.1–1)
BUN SERPL-MCNC: 11 MG/DL (ref 8–23)
CALCIUM SERPL-MCNC: 9.1 MG/DL (ref 8.7–10.5)
CHLORIDE SERPL-SCNC: 102 MMOL/L (ref 95–110)
CHOLEST SERPL-MCNC: 139 MG/DL (ref 120–199)
CHOLEST/HDLC SERPL: 2.5 {RATIO} (ref 2–5)
CO2 SERPL-SCNC: 26 MMOL/L (ref 23–29)
CREAT SERPL-MCNC: 0.7 MG/DL (ref 0.5–1.4)
DIFFERENTIAL METHOD: ABNORMAL
EOSINOPHIL # BLD AUTO: 0.3 K/UL (ref 0–0.5)
EOSINOPHIL NFR BLD: 4.6 % (ref 0–8)
ERYTHROCYTE [DISTWIDTH] IN BLOOD BY AUTOMATED COUNT: 13.9 % (ref 11.5–14.5)
EST. GFR  (AFRICAN AMERICAN): >60 ML/MIN/1.73 M^2
EST. GFR  (NON AFRICAN AMERICAN): >60 ML/MIN/1.73 M^2
GLUCOSE SERPL-MCNC: 103 MG/DL (ref 70–110)
HCT VFR BLD AUTO: 40.1 % (ref 37–48.5)
HDLC SERPL-MCNC: 55 MG/DL (ref 40–75)
HDLC SERPL: 39.6 % (ref 20–50)
HGB BLD-MCNC: 12.6 G/DL (ref 12–16)
IMM GRANULOCYTES # BLD AUTO: 0.01 K/UL (ref 0–0.04)
IMM GRANULOCYTES NFR BLD AUTO: 0.2 % (ref 0–0.5)
LDLC SERPL CALC-MCNC: 67.6 MG/DL (ref 63–159)
LYMPHOCYTES # BLD AUTO: 2 K/UL (ref 1–4.8)
LYMPHOCYTES NFR BLD: 31.1 % (ref 18–48)
MCH RBC QN AUTO: 29.9 PG (ref 27–31)
MCHC RBC AUTO-ENTMCNC: 31.4 G/DL (ref 32–36)
MCV RBC AUTO: 95 FL (ref 82–98)
MONOCYTES # BLD AUTO: 0.6 K/UL (ref 0.3–1)
MONOCYTES NFR BLD: 8.5 % (ref 4–15)
NEUTROPHILS # BLD AUTO: 3.5 K/UL (ref 1.8–7.7)
NEUTROPHILS NFR BLD: 54.7 % (ref 38–73)
NONHDLC SERPL-MCNC: 84 MG/DL
NRBC BLD-RTO: 0 /100 WBC
PLATELET # BLD AUTO: 235 K/UL (ref 150–350)
PMV BLD AUTO: 9 FL (ref 9.2–12.9)
POTASSIUM SERPL-SCNC: 4.7 MMOL/L (ref 3.5–5.1)
PROT SERPL-MCNC: 7.1 G/DL (ref 6–8.4)
RBC # BLD AUTO: 4.22 M/UL (ref 4–5.4)
SODIUM SERPL-SCNC: 138 MMOL/L (ref 136–145)
TRIGL SERPL-MCNC: 82 MG/DL (ref 30–150)
WBC # BLD AUTO: 6.46 K/UL (ref 3.9–12.7)

## 2020-12-29 PROCEDURE — 85025 COMPLETE CBC W/AUTO DIFF WBC: CPT

## 2020-12-29 PROCEDURE — 80061 LIPID PANEL: CPT

## 2020-12-29 PROCEDURE — 80053 COMPREHEN METABOLIC PANEL: CPT

## 2020-12-29 PROCEDURE — 36415 COLL VENOUS BLD VENIPUNCTURE: CPT

## 2021-01-06 ENCOUNTER — OFFICE VISIT (OUTPATIENT)
Dept: DERMATOLOGY | Facility: CLINIC | Age: 81
End: 2021-01-06
Payer: MEDICARE

## 2021-01-06 DIAGNOSIS — Z12.83 SKIN CANCER SCREENING: ICD-10-CM

## 2021-01-06 DIAGNOSIS — L82.1 SEBORRHEIC KERATOSIS: ICD-10-CM

## 2021-01-06 DIAGNOSIS — L91.8 SKIN TAG: ICD-10-CM

## 2021-01-06 DIAGNOSIS — L90.5 SCAR: Primary | ICD-10-CM

## 2021-01-06 PROCEDURE — 99999 PR PBB SHADOW E&M-EST. PATIENT-LVL II: ICD-10-PCS | Mod: PBBFAC,,, | Performed by: DERMATOLOGY

## 2021-01-06 PROCEDURE — 1159F MED LIST DOCD IN RCRD: CPT | Mod: S$GLB,,, | Performed by: DERMATOLOGY

## 2021-01-06 PROCEDURE — 1159F PR MEDICATION LIST DOCUMENTED IN MEDICAL RECORD: ICD-10-PCS | Mod: S$GLB,,, | Performed by: DERMATOLOGY

## 2021-01-06 PROCEDURE — 11200 PR REMOVAL OF SKIN TAGS, UP TO 15: ICD-10-PCS | Mod: S$GLB,,, | Performed by: DERMATOLOGY

## 2021-01-06 PROCEDURE — 99213 OFFICE O/P EST LOW 20 MIN: CPT | Mod: 25,S$GLB,, | Performed by: DERMATOLOGY

## 2021-01-06 PROCEDURE — 11200 RMVL SKIN TAGS UP TO&INC 15: CPT | Mod: S$GLB,,, | Performed by: DERMATOLOGY

## 2021-01-06 PROCEDURE — 99999 PR PBB SHADOW E&M-EST. PATIENT-LVL II: CPT | Mod: PBBFAC,,, | Performed by: DERMATOLOGY

## 2021-01-06 PROCEDURE — 99213 PR OFFICE/OUTPT VISIT, EST, LEVL III, 20-29 MIN: ICD-10-PCS | Mod: 25,S$GLB,, | Performed by: DERMATOLOGY

## 2021-01-06 RX ORDER — ALPRAZOLAM 0.25 MG/1
0.25 TABLET ORAL EVERY 12 HOURS PRN
COMMUNITY
Start: 2020-12-15 | End: 2021-11-05

## 2021-01-06 RX ORDER — A/SINGAPORE/GP1908/2015 IVR-180 (AN A/MICHIGAN/45/2015 (H1N1)PDM09-LIKE VIRUS, A/HONG KONG/4801/2014, NYMC X-263B (H3N2) (AN A/HONG KONG/4801/2014-LIKE VIRUS), AND B/BRISBANE/60/2008, WILD TYPE (A B/BRISBANE/60/2008-LIKE VIRUS) 15; 15; 15 UG/.5ML; UG/.5ML; UG/.5ML
INJECTION, SUSPENSION INTRAMUSCULAR
COMMUNITY
Start: 2020-10-09 | End: 2021-11-05

## 2021-01-06 RX ORDER — UMECLIDINIUM BROMIDE AND VILANTEROL TRIFENATATE 62.5; 25 UG/1; UG/1
1 POWDER RESPIRATORY (INHALATION) DAILY
COMMUNITY
Start: 2020-10-12 | End: 2021-11-05

## 2021-01-15 ENCOUNTER — LAB VISIT (OUTPATIENT)
Dept: LAB | Facility: HOSPITAL | Age: 81
End: 2021-01-15
Attending: INTERNAL MEDICINE
Payer: MEDICARE

## 2021-01-15 DIAGNOSIS — R05.8 COUGH WITH EXPOSURE TO SEVERE ACUTE RESPIRATORY SYNDROME CORONAVIRUS 2 (SARS-COV-2): Primary | ICD-10-CM

## 2021-01-15 DIAGNOSIS — Z20.822 COUGH WITH EXPOSURE TO SEVERE ACUTE RESPIRATORY SYNDROME CORONAVIRUS 2 (SARS-COV-2): Primary | ICD-10-CM

## 2021-01-15 PROCEDURE — 36415 COLL VENOUS BLD VENIPUNCTURE: CPT

## 2021-01-15 PROCEDURE — 86769 SARS-COV-2 COVID-19 ANTIBODY: CPT

## 2021-01-16 LAB — SARS-COV-2 IGG SERPLBLD QL IA.RAPID: POSITIVE

## 2021-06-29 ENCOUNTER — LAB VISIT (OUTPATIENT)
Dept: LAB | Facility: HOSPITAL | Age: 81
End: 2021-06-29
Attending: INTERNAL MEDICINE
Payer: MEDICARE

## 2021-06-29 DIAGNOSIS — I10 ESSENTIAL HYPERTENSION, MALIGNANT: ICD-10-CM

## 2021-06-29 DIAGNOSIS — E78.5 HYPERLIPEMIA: Primary | ICD-10-CM

## 2021-06-29 DIAGNOSIS — J45.909 ASTHMATIC BRONCHITIS: ICD-10-CM

## 2021-06-29 LAB
ALBUMIN SERPL BCP-MCNC: 3.8 G/DL (ref 3.5–5.2)
ALP SERPL-CCNC: 69 U/L (ref 55–135)
ALT SERPL W/O P-5'-P-CCNC: 15 U/L (ref 10–44)
ANION GAP SERPL CALC-SCNC: 10 MMOL/L (ref 8–16)
AST SERPL-CCNC: 17 U/L (ref 10–40)
BASOPHILS # BLD AUTO: 0.06 K/UL (ref 0–0.2)
BASOPHILS NFR BLD: 0.8 % (ref 0–1.9)
BILIRUB SERPL-MCNC: 0.5 MG/DL (ref 0.1–1)
BUN SERPL-MCNC: 11 MG/DL (ref 8–23)
CALCIUM SERPL-MCNC: 9.3 MG/DL (ref 8.7–10.5)
CHLORIDE SERPL-SCNC: 102 MMOL/L (ref 95–110)
CHOLEST SERPL-MCNC: 150 MG/DL (ref 120–199)
CHOLEST/HDLC SERPL: 2.4 {RATIO} (ref 2–5)
CO2 SERPL-SCNC: 24 MMOL/L (ref 23–29)
CREAT SERPL-MCNC: 0.7 MG/DL (ref 0.5–1.4)
DIFFERENTIAL METHOD: ABNORMAL
EOSINOPHIL # BLD AUTO: 0.3 K/UL (ref 0–0.5)
EOSINOPHIL NFR BLD: 4.5 % (ref 0–8)
ERYTHROCYTE [DISTWIDTH] IN BLOOD BY AUTOMATED COUNT: 13.4 % (ref 11.5–14.5)
EST. GFR  (AFRICAN AMERICAN): >60 ML/MIN/1.73 M^2
EST. GFR  (NON AFRICAN AMERICAN): >60 ML/MIN/1.73 M^2
GLUCOSE SERPL-MCNC: 103 MG/DL (ref 70–110)
HCT VFR BLD AUTO: 39.8 % (ref 37–48.5)
HDLC SERPL-MCNC: 63 MG/DL (ref 40–75)
HDLC SERPL: 42 % (ref 20–50)
HGB BLD-MCNC: 11.5 G/DL (ref 12–16)
IMM GRANULOCYTES # BLD AUTO: 0.02 K/UL (ref 0–0.04)
IMM GRANULOCYTES NFR BLD AUTO: 0.3 % (ref 0–0.5)
LDLC SERPL CALC-MCNC: 67.2 MG/DL (ref 63–159)
LYMPHOCYTES # BLD AUTO: 2.2 K/UL (ref 1–4.8)
LYMPHOCYTES NFR BLD: 31 % (ref 18–48)
MCH RBC QN AUTO: 28.3 PG (ref 27–31)
MCHC RBC AUTO-ENTMCNC: 28.9 G/DL (ref 32–36)
MCV RBC AUTO: 98 FL (ref 82–98)
MONOCYTES # BLD AUTO: 0.6 K/UL (ref 0.3–1)
MONOCYTES NFR BLD: 8.6 % (ref 4–15)
NEUTROPHILS # BLD AUTO: 3.9 K/UL (ref 1.8–7.7)
NEUTROPHILS NFR BLD: 54.8 % (ref 38–73)
NONHDLC SERPL-MCNC: 87 MG/DL
NRBC BLD-RTO: 0 /100 WBC
PLATELET # BLD AUTO: 272 K/UL (ref 150–450)
PMV BLD AUTO: 9.4 FL (ref 9.2–12.9)
POTASSIUM SERPL-SCNC: 4.5 MMOL/L (ref 3.5–5.1)
PROT SERPL-MCNC: 7.2 G/DL (ref 6–8.4)
RBC # BLD AUTO: 4.07 M/UL (ref 4–5.4)
SODIUM SERPL-SCNC: 136 MMOL/L (ref 136–145)
T4 FREE SERPL-MCNC: 0.91 NG/DL (ref 0.71–1.51)
T4 SERPL-MCNC: 8.3 UG/DL (ref 4.5–11.5)
TRIGL SERPL-MCNC: 99 MG/DL (ref 30–150)
WBC # BLD AUTO: 7.19 K/UL (ref 3.9–12.7)

## 2021-06-29 PROCEDURE — 85025 COMPLETE CBC W/AUTO DIFF WBC: CPT | Performed by: INTERNAL MEDICINE

## 2021-06-29 PROCEDURE — 36415 COLL VENOUS BLD VENIPUNCTURE: CPT | Performed by: INTERNAL MEDICINE

## 2021-06-29 PROCEDURE — 84439 ASSAY OF FREE THYROXINE: CPT | Performed by: INTERNAL MEDICINE

## 2021-06-29 PROCEDURE — 84436 ASSAY OF TOTAL THYROXINE: CPT | Performed by: INTERNAL MEDICINE

## 2021-06-29 PROCEDURE — 80061 LIPID PANEL: CPT | Performed by: INTERNAL MEDICINE

## 2021-06-29 PROCEDURE — 80053 COMPREHEN METABOLIC PANEL: CPT | Performed by: INTERNAL MEDICINE

## 2021-10-18 DIAGNOSIS — C20 MALIGNANT NEOPLASM OF RECTUM: Primary | ICD-10-CM

## 2021-10-28 ENCOUNTER — HOSPITAL ENCOUNTER (OUTPATIENT)
Dept: RADIOLOGY | Facility: HOSPITAL | Age: 81
Discharge: HOME | End: 2021-10-28
Attending: INTERNAL MEDICINE
Payer: MEDICARE

## 2021-10-28 VITALS — WEIGHT: 141 LBS | HEIGHT: 59 IN | BODY MASS INDEX: 28.43 KG/M2

## 2021-10-28 DIAGNOSIS — C20 MALIGNANT NEOPLASM OF RECTUM: ICD-10-CM

## 2021-10-28 LAB — GLUCOSE SERPL-MCNC: 98 MG/DL (ref 70–110)

## 2021-10-28 PROCEDURE — 78815 PET IMAGE W/CT SKULL-THIGH: CPT | Mod: TC,PO,PI

## 2021-11-01 DIAGNOSIS — R16.0 HEPATOMEGALY: Primary | ICD-10-CM

## 2021-11-04 PROBLEM — D50.0 IRON DEFICIENCY ANEMIA DUE TO CHRONIC BLOOD LOSS: Status: ACTIVE | Noted: 2021-11-04

## 2021-11-04 PROBLEM — C18.2 CANCER OF ASCENDING COLON: Status: ACTIVE | Noted: 2021-11-04

## 2021-11-04 PROBLEM — C20 RECTAL CANCER: Status: ACTIVE | Noted: 2021-11-04

## 2021-11-04 PROBLEM — R94.8 ABNORMAL GASTROINTESTINAL PET SCAN: Status: ACTIVE | Noted: 2021-11-04

## 2021-11-04 PROBLEM — K76.89 LIVER NODULE: Status: ACTIVE | Noted: 2021-11-04

## 2021-11-05 ENCOUNTER — TELEPHONE (OUTPATIENT)
Dept: FAMILY MEDICINE | Facility: CLINIC | Age: 81
End: 2021-11-05
Payer: MEDICARE

## 2021-11-05 ENCOUNTER — TELEPHONE (OUTPATIENT)
Dept: SURGERY | Facility: CLINIC | Age: 81
End: 2021-11-05
Payer: MEDICARE

## 2021-11-05 ENCOUNTER — OFFICE VISIT (OUTPATIENT)
Dept: HEMATOLOGY/ONCOLOGY | Facility: CLINIC | Age: 81
End: 2021-11-05
Payer: MEDICARE

## 2021-11-05 VITALS
BODY MASS INDEX: 25.6 KG/M2 | RESPIRATION RATE: 16 BRPM | HEART RATE: 67 BPM | WEIGHT: 127 LBS | SYSTOLIC BLOOD PRESSURE: 157 MMHG | HEIGHT: 59 IN | DIASTOLIC BLOOD PRESSURE: 62 MMHG

## 2021-11-05 DIAGNOSIS — C18.2 CANCER OF ASCENDING COLON: ICD-10-CM

## 2021-11-05 DIAGNOSIS — R94.8 ABNORMAL GASTROINTESTINAL PET SCAN: ICD-10-CM

## 2021-11-05 DIAGNOSIS — K62.1 RECTAL POLYP: Primary | ICD-10-CM

## 2021-11-05 DIAGNOSIS — D50.0 IRON DEFICIENCY ANEMIA DUE TO CHRONIC BLOOD LOSS: ICD-10-CM

## 2021-11-05 DIAGNOSIS — K76.89 LIVER NODULE: ICD-10-CM

## 2021-11-05 PROCEDURE — 1160F PR REVIEW ALL MEDS BY PRESCRIBER/CLIN PHARMACIST DOCUMENTED: ICD-10-PCS | Mod: S$GLB,,, | Performed by: INTERNAL MEDICINE

## 2021-11-05 PROCEDURE — 3288F PR FALLS RISK ASSESSMENT DOCUMENTED: ICD-10-PCS | Mod: S$GLB,,, | Performed by: INTERNAL MEDICINE

## 2021-11-05 PROCEDURE — 3288F FALL RISK ASSESSMENT DOCD: CPT | Mod: S$GLB,,, | Performed by: INTERNAL MEDICINE

## 2021-11-05 PROCEDURE — 1126F PR PAIN SEVERITY QUANTIFIED, NO PAIN PRESENT: ICD-10-PCS | Mod: S$GLB,,, | Performed by: INTERNAL MEDICINE

## 2021-11-05 PROCEDURE — 1159F MED LIST DOCD IN RCRD: CPT | Mod: S$GLB,,, | Performed by: INTERNAL MEDICINE

## 2021-11-05 PROCEDURE — 3078F PR MOST RECENT DIASTOLIC BLOOD PRESSURE < 80 MM HG: ICD-10-PCS | Mod: S$GLB,,, | Performed by: INTERNAL MEDICINE

## 2021-11-05 PROCEDURE — 3077F SYST BP >= 140 MM HG: CPT | Mod: S$GLB,,, | Performed by: INTERNAL MEDICINE

## 2021-11-05 PROCEDURE — 1159F PR MEDICATION LIST DOCUMENTED IN MEDICAL RECORD: ICD-10-PCS | Mod: S$GLB,,, | Performed by: INTERNAL MEDICINE

## 2021-11-05 PROCEDURE — 3078F DIAST BP <80 MM HG: CPT | Mod: S$GLB,,, | Performed by: INTERNAL MEDICINE

## 2021-11-05 PROCEDURE — 1101F PR PT FALLS ASSESS DOC 0-1 FALLS W/OUT INJ PAST YR: ICD-10-PCS | Mod: S$GLB,,, | Performed by: INTERNAL MEDICINE

## 2021-11-05 PROCEDURE — 1160F RVW MEDS BY RX/DR IN RCRD: CPT | Mod: S$GLB,,, | Performed by: INTERNAL MEDICINE

## 2021-11-05 PROCEDURE — 3077F PR MOST RECENT SYSTOLIC BLOOD PRESSURE >= 140 MM HG: ICD-10-PCS | Mod: S$GLB,,, | Performed by: INTERNAL MEDICINE

## 2021-11-05 PROCEDURE — 1126F AMNT PAIN NOTED NONE PRSNT: CPT | Mod: S$GLB,,, | Performed by: INTERNAL MEDICINE

## 2021-11-05 PROCEDURE — 99204 OFFICE O/P NEW MOD 45 MIN: CPT | Mod: S$GLB,,, | Performed by: INTERNAL MEDICINE

## 2021-11-05 PROCEDURE — 1101F PT FALLS ASSESS-DOCD LE1/YR: CPT | Mod: S$GLB,,, | Performed by: INTERNAL MEDICINE

## 2021-11-05 PROCEDURE — 99204 PR OFFICE/OUTPT VISIT, NEW, LEVL IV, 45-59 MIN: ICD-10-PCS | Mod: S$GLB,,, | Performed by: INTERNAL MEDICINE

## 2021-11-10 ENCOUNTER — TELEPHONE (OUTPATIENT)
Dept: HEMATOLOGY/ONCOLOGY | Facility: CLINIC | Age: 81
End: 2021-11-10
Payer: MEDICARE

## 2021-11-10 LAB
ALBUMIN SERPL-MCNC: 4.1 G/DL (ref 3.6–5.1)
ALBUMIN/GLOB SERPL: 1.5 (CALC) (ref 1–2.5)
ALP SERPL-CCNC: 68 U/L (ref 37–153)
ALT SERPL-CCNC: 9 U/L (ref 6–29)
AST SERPL-CCNC: 18 U/L (ref 10–35)
BASOPHILS # BLD AUTO: 48 CELLS/UL (ref 0–200)
BASOPHILS NFR BLD AUTO: 0.8 %
BILIRUB SERPL-MCNC: 0.4 MG/DL (ref 0.2–1.2)
BUN SERPL-MCNC: 10 MG/DL (ref 7–25)
BUN/CREAT SERPL: ABNORMAL (CALC) (ref 6–22)
CALCIUM SERPL-MCNC: 9.3 MG/DL (ref 8.6–10.4)
CEA SERPL-MCNC: 2.5 NG/ML
CHLORIDE SERPL-SCNC: 99 MMOL/L (ref 98–110)
CO2 SERPL-SCNC: 30 MMOL/L (ref 20–32)
CREAT SERPL-MCNC: 0.65 MG/DL (ref 0.6–0.88)
EOSINOPHIL # BLD AUTO: 204 CELLS/UL (ref 15–500)
EOSINOPHIL NFR BLD AUTO: 3.4 %
ERYTHROCYTE [DISTWIDTH] IN BLOOD BY AUTOMATED COUNT: 15.2 % (ref 11–15)
GLOBULIN SER CALC-MCNC: 2.8 G/DL (CALC) (ref 1.9–3.7)
GLUCOSE SERPL-MCNC: 107 MG/DL (ref 65–99)
HCT VFR BLD AUTO: 32.4 % (ref 35–45)
HGB BLD-MCNC: 9.4 G/DL (ref 11.7–15.5)
LYMPHOCYTES # BLD AUTO: 1734 CELLS/UL (ref 850–3900)
LYMPHOCYTES NFR BLD AUTO: 28.9 %
MCH RBC QN AUTO: 23.3 PG (ref 27–33)
MCHC RBC AUTO-ENTMCNC: 29 G/DL (ref 32–36)
MCV RBC AUTO: 80.4 FL (ref 80–100)
MONOCYTES # BLD AUTO: 582 CELLS/UL (ref 200–950)
MONOCYTES NFR BLD AUTO: 9.7 %
NEUTROPHILS # BLD AUTO: 3432 CELLS/UL (ref 1500–7800)
NEUTROPHILS NFR BLD AUTO: 57.2 %
PLATELET # BLD AUTO: 382 THOUSAND/UL (ref 140–400)
PMV BLD REES-ECKER: 9.4 FL (ref 7.5–12.5)
POTASSIUM SERPL-SCNC: 4.1 MMOL/L (ref 3.5–5.3)
PROT SERPL-MCNC: 6.9 G/DL (ref 6.1–8.1)
RBC # BLD AUTO: 4.03 MILLION/UL (ref 3.8–5.1)
SODIUM SERPL-SCNC: 136 MMOL/L (ref 135–146)
WBC # BLD AUTO: 6 THOUSAND/UL (ref 3.8–10.8)

## 2021-11-15 ENCOUNTER — TELEPHONE (OUTPATIENT)
Dept: HEMATOLOGY/ONCOLOGY | Facility: CLINIC | Age: 81
End: 2021-11-15
Payer: MEDICARE

## 2021-11-15 ENCOUNTER — HOSPITAL ENCOUNTER (OUTPATIENT)
Dept: RADIOLOGY | Facility: HOSPITAL | Age: 81
Discharge: HOME | End: 2021-11-15
Attending: INTERNAL MEDICINE
Payer: MEDICARE

## 2021-11-15 DIAGNOSIS — R16.0 HEPATOMEGALY: ICD-10-CM

## 2021-11-15 PROCEDURE — 74183 MRI ABD W/O CNTR FLWD CNTR: CPT | Mod: TC,PO

## 2021-11-15 PROCEDURE — A9585 GADOBUTROL INJECTION: HCPCS | Mod: PO | Performed by: INTERNAL MEDICINE

## 2021-11-15 PROCEDURE — 25500020 PHARM REV CODE 255: Mod: PO | Performed by: INTERNAL MEDICINE

## 2021-11-15 RX ORDER — GADOBUTROL 604.72 MG/ML
5.5 INJECTION INTRAVENOUS
Status: COMPLETED | OUTPATIENT
Start: 2021-11-15 | End: 2021-11-15

## 2021-11-15 RX ADMIN — GADOBUTROL 5.5 ML: 604.72 INJECTION INTRAVENOUS at 10:11

## 2021-11-16 ENCOUNTER — OFFICE VISIT (OUTPATIENT)
Dept: SURGERY | Facility: CLINIC | Age: 81
End: 2021-11-16
Payer: MEDICARE

## 2021-11-16 VITALS
HEIGHT: 59 IN | DIASTOLIC BLOOD PRESSURE: 70 MMHG | WEIGHT: 128.5 LBS | SYSTOLIC BLOOD PRESSURE: 159 MMHG | TEMPERATURE: 98 F | BODY MASS INDEX: 25.91 KG/M2 | HEART RATE: 63 BPM

## 2021-11-16 DIAGNOSIS — R94.8 ABNORMAL GASTROINTESTINAL PET SCAN: ICD-10-CM

## 2021-11-16 DIAGNOSIS — C18.2 CANCER OF ASCENDING COLON: ICD-10-CM

## 2021-11-16 DIAGNOSIS — K62.1 RECTAL POLYP: ICD-10-CM

## 2021-11-16 PROCEDURE — 99999 PR PBB SHADOW E&M-EST. PATIENT-LVL III: ICD-10-PCS | Mod: PBBFAC,,, | Performed by: SURGERY

## 2021-11-16 PROCEDURE — 1160F RVW MEDS BY RX/DR IN RCRD: CPT | Mod: S$GLB,,, | Performed by: SURGERY

## 2021-11-16 PROCEDURE — 1126F PR PAIN SEVERITY QUANTIFIED, NO PAIN PRESENT: ICD-10-PCS | Mod: S$GLB,,, | Performed by: SURGERY

## 2021-11-16 PROCEDURE — 3288F PR FALLS RISK ASSESSMENT DOCUMENTED: ICD-10-PCS | Mod: S$GLB,,, | Performed by: SURGERY

## 2021-11-16 PROCEDURE — 3077F SYST BP >= 140 MM HG: CPT | Mod: S$GLB,,, | Performed by: SURGERY

## 2021-11-16 PROCEDURE — 99205 PR OFFICE/OUTPT VISIT, NEW, LEVL V, 60-74 MIN: ICD-10-PCS | Mod: S$GLB,,, | Performed by: SURGERY

## 2021-11-16 PROCEDURE — 1101F PT FALLS ASSESS-DOCD LE1/YR: CPT | Mod: S$GLB,,, | Performed by: SURGERY

## 2021-11-16 PROCEDURE — 1160F PR REVIEW ALL MEDS BY PRESCRIBER/CLIN PHARMACIST DOCUMENTED: ICD-10-PCS | Mod: S$GLB,,, | Performed by: SURGERY

## 2021-11-16 PROCEDURE — 3288F FALL RISK ASSESSMENT DOCD: CPT | Mod: S$GLB,,, | Performed by: SURGERY

## 2021-11-16 PROCEDURE — 1101F PR PT FALLS ASSESS DOC 0-1 FALLS W/OUT INJ PAST YR: ICD-10-PCS | Mod: S$GLB,,, | Performed by: SURGERY

## 2021-11-16 PROCEDURE — 3078F PR MOST RECENT DIASTOLIC BLOOD PRESSURE < 80 MM HG: ICD-10-PCS | Mod: S$GLB,,, | Performed by: SURGERY

## 2021-11-16 PROCEDURE — 99999 PR PBB SHADOW E&M-EST. PATIENT-LVL III: CPT | Mod: PBBFAC,,, | Performed by: SURGERY

## 2021-11-16 PROCEDURE — 1126F AMNT PAIN NOTED NONE PRSNT: CPT | Mod: S$GLB,,, | Performed by: SURGERY

## 2021-11-16 PROCEDURE — 3078F DIAST BP <80 MM HG: CPT | Mod: S$GLB,,, | Performed by: SURGERY

## 2021-11-16 PROCEDURE — 1159F PR MEDICATION LIST DOCUMENTED IN MEDICAL RECORD: ICD-10-PCS | Mod: S$GLB,,, | Performed by: SURGERY

## 2021-11-16 PROCEDURE — 3077F PR MOST RECENT SYSTOLIC BLOOD PRESSURE >= 140 MM HG: ICD-10-PCS | Mod: S$GLB,,, | Performed by: SURGERY

## 2021-11-16 PROCEDURE — 1159F MED LIST DOCD IN RCRD: CPT | Mod: S$GLB,,, | Performed by: SURGERY

## 2021-11-16 PROCEDURE — 99205 OFFICE O/P NEW HI 60 MIN: CPT | Mod: S$GLB,,, | Performed by: SURGERY

## 2021-11-26 ENCOUNTER — HOSPITAL ENCOUNTER (OUTPATIENT)
Dept: PREADMISSION TESTING | Facility: HOSPITAL | Age: 81
Discharge: HOME | End: 2021-11-26
Attending: INTERNAL MEDICINE
Payer: MEDICARE

## 2021-11-26 ENCOUNTER — HOSPITAL ENCOUNTER (OUTPATIENT)
Dept: RADIOLOGY | Facility: HOSPITAL | Age: 81
Discharge: HOME | End: 2021-11-26
Attending: INTERNAL MEDICINE
Payer: MEDICARE

## 2021-11-26 VITALS
BODY MASS INDEX: 26.41 KG/M2 | HEIGHT: 59 IN | DIASTOLIC BLOOD PRESSURE: 71 MMHG | WEIGHT: 131 LBS | SYSTOLIC BLOOD PRESSURE: 176 MMHG | TEMPERATURE: 98 F | OXYGEN SATURATION: 97 % | HEART RATE: 83 BPM | RESPIRATION RATE: 18 BRPM

## 2021-11-26 DIAGNOSIS — Z01.818 PREOP EXAMINATION: Primary | ICD-10-CM

## 2021-11-26 DIAGNOSIS — Z01.818 PREOP TESTING: Primary | ICD-10-CM

## 2021-11-26 DIAGNOSIS — Z01.818 PREOP EXAMINATION: ICD-10-CM

## 2021-11-26 LAB
BASOPHILS # BLD AUTO: 0.07 K/UL (ref 0–0.2)
BASOPHILS NFR BLD: 0.9 % (ref 0–1.9)
DIFFERENTIAL METHOD: ABNORMAL
EOSINOPHIL # BLD AUTO: 0.2 K/UL (ref 0–0.5)
EOSINOPHIL NFR BLD: 3.1 % (ref 0–8)
ERYTHROCYTE [DISTWIDTH] IN BLOOD BY AUTOMATED COUNT: 17.1 % (ref 11.5–14.5)
HCT VFR BLD AUTO: 34.2 % (ref 37–48.5)
HGB BLD-MCNC: 9.7 G/DL (ref 12–16)
IMM GRANULOCYTES # BLD AUTO: 0.02 K/UL (ref 0–0.04)
IMM GRANULOCYTES NFR BLD AUTO: 0.3 % (ref 0–0.5)
LYMPHOCYTES # BLD AUTO: 2.3 K/UL (ref 1–4.8)
LYMPHOCYTES NFR BLD: 30.1 % (ref 18–48)
MCH RBC QN AUTO: 22.5 PG (ref 27–31)
MCHC RBC AUTO-ENTMCNC: 28.4 G/DL (ref 32–36)
MCV RBC AUTO: 79 FL (ref 82–98)
MONOCYTES # BLD AUTO: 0.7 K/UL (ref 0.3–1)
MONOCYTES NFR BLD: 8.8 % (ref 4–15)
NEUTROPHILS # BLD AUTO: 4.4 K/UL (ref 1.8–7.7)
NEUTROPHILS NFR BLD: 56.8 % (ref 38–73)
NRBC BLD-RTO: 0 /100 WBC
PLATELET # BLD AUTO: 369 K/UL (ref 150–450)
PMV BLD AUTO: 9 FL (ref 9.2–12.9)
RBC # BLD AUTO: 4.31 M/UL (ref 4–5.4)
WBC # BLD AUTO: 7.73 K/UL (ref 3.9–12.7)

## 2021-11-26 PROCEDURE — 93005 ELECTROCARDIOGRAM TRACING: CPT | Performed by: INTERNAL MEDICINE

## 2021-11-26 PROCEDURE — 85025 COMPLETE CBC W/AUTO DIFF WBC: CPT | Performed by: INTERNAL MEDICINE

## 2021-11-26 PROCEDURE — 71046 X-RAY EXAM CHEST 2 VIEWS: CPT | Mod: TC

## 2021-11-26 PROCEDURE — 36415 COLL VENOUS BLD VENIPUNCTURE: CPT | Performed by: INTERNAL MEDICINE

## 2021-11-26 PROCEDURE — 93010 ELECTROCARDIOGRAM REPORT: CPT | Mod: ,,, | Performed by: INTERNAL MEDICINE

## 2021-11-26 PROCEDURE — 93010 EKG 12-LEAD: ICD-10-PCS | Mod: ,,, | Performed by: INTERNAL MEDICINE

## 2021-11-30 ENCOUNTER — ANESTHESIA (OUTPATIENT)
Dept: SURGERY | Facility: HOSPITAL | Age: 81
End: 2021-11-30
Payer: MEDICARE

## 2021-11-30 ENCOUNTER — HOSPITAL ENCOUNTER (OUTPATIENT)
Facility: HOSPITAL | Age: 81
Discharge: HOME | End: 2021-11-30
Attending: INTERNAL MEDICINE | Admitting: INTERNAL MEDICINE
Payer: MEDICARE

## 2021-11-30 ENCOUNTER — ANESTHESIA EVENT (OUTPATIENT)
Dept: SURGERY | Facility: HOSPITAL | Age: 81
End: 2021-11-30
Payer: MEDICARE

## 2021-11-30 VITALS
DIASTOLIC BLOOD PRESSURE: 63 MMHG | RESPIRATION RATE: 15 BRPM | SYSTOLIC BLOOD PRESSURE: 139 MMHG | HEART RATE: 60 BPM | TEMPERATURE: 97 F | OXYGEN SATURATION: 96 %

## 2021-11-30 DIAGNOSIS — R19.00 INTRA-ABDOMINAL AND PELVIC SWELLING, MASS AND LUMP, UNSPECIFIED SITE: ICD-10-CM

## 2021-11-30 LAB — SARS-COV-2 RDRP RESP QL NAA+PROBE: NEGATIVE

## 2021-11-30 PROCEDURE — 27202049 HC PROBE, APC ERBE: Performed by: INTERNAL MEDICINE

## 2021-11-30 PROCEDURE — 25000003 PHARM REV CODE 250: Performed by: NURSE ANESTHETIST, CERTIFIED REGISTERED

## 2021-11-30 PROCEDURE — U0002 COVID-19 LAB TEST NON-CDC: HCPCS | Performed by: INTERNAL MEDICINE

## 2021-11-30 PROCEDURE — 27000675 HC TUBING MICRODRIP: Performed by: ANESTHESIOLOGY

## 2021-11-30 PROCEDURE — 45391 COLONOSCOPY W/ENDOSCOPE US: CPT | Performed by: INTERNAL MEDICINE

## 2021-11-30 PROCEDURE — 88305 TISSUE EXAM BY PATHOLOGIST: CPT | Mod: TC

## 2021-11-30 PROCEDURE — 27000284 HC CANNULA NASAL: Performed by: ANESTHESIOLOGY

## 2021-11-30 PROCEDURE — 27202343 HC ENDOSCOPIC MARKER: Performed by: INTERNAL MEDICINE

## 2021-11-30 PROCEDURE — 27202438 HC MUCOSAL LIFTING AGENT: Performed by: INTERNAL MEDICINE

## 2021-11-30 PROCEDURE — 45381 COLONOSCOPY SUBMUCOUS NJX: CPT | Mod: XS | Performed by: INTERNAL MEDICINE

## 2021-11-30 PROCEDURE — 45390 COLONOSCOPY W/RESECTION: CPT | Performed by: INTERNAL MEDICINE

## 2021-11-30 PROCEDURE — 27201028 HC NEEDLE, SCLERO: Performed by: INTERNAL MEDICINE

## 2021-11-30 PROCEDURE — 27202103: Performed by: ANESTHESIOLOGY

## 2021-11-30 PROCEDURE — 37000008 HC ANESTHESIA 1ST 15 MINUTES: Performed by: INTERNAL MEDICINE

## 2021-11-30 PROCEDURE — 63600175 PHARM REV CODE 636 W HCPCS: Performed by: NURSE ANESTHETIST, CERTIFIED REGISTERED

## 2021-11-30 PROCEDURE — 45388 COLONOSCOPY W/ABLATION: CPT | Mod: 59 | Performed by: INTERNAL MEDICINE

## 2021-11-30 PROCEDURE — 27201114 HC TRAP (ANY): Performed by: INTERNAL MEDICINE

## 2021-11-30 PROCEDURE — 25000242 PHARM REV CODE 250 ALT 637 W/ HCPCS: Performed by: ANESTHESIOLOGY

## 2021-11-30 PROCEDURE — 27000671 HC TUBING MICROBORE EXT: Performed by: ANESTHESIOLOGY

## 2021-11-30 PROCEDURE — 37000009 HC ANESTHESIA EA ADD 15 MINS: Performed by: INTERNAL MEDICINE

## 2021-11-30 PROCEDURE — 27201089 HC SNARE, DISP (ANY): Performed by: INTERNAL MEDICINE

## 2021-11-30 RX ORDER — PHENYLEPHRINE HYDROCHLORIDE 10 MG/ML
INJECTION INTRAVENOUS
Status: DISCONTINUED | OUTPATIENT
Start: 2021-11-30 | End: 2021-11-30

## 2021-11-30 RX ORDER — PROPOFOL 10 MG/ML
VIAL (ML) INTRAVENOUS
Status: DISCONTINUED | OUTPATIENT
Start: 2021-11-30 | End: 2021-11-30

## 2021-11-30 RX ORDER — ALBUTEROL SULFATE 0.83 MG/ML
SOLUTION RESPIRATORY (INHALATION)
Status: COMPLETED | OUTPATIENT
Start: 2021-11-30 | End: 2021-11-30

## 2021-11-30 RX ORDER — ALBUTEROL SULFATE 0.83 MG/ML
2.5 SOLUTION RESPIRATORY (INHALATION) EVERY 6 HOURS PRN
COMMUNITY
End: 2023-10-31 | Stop reason: SDUPTHER

## 2021-11-30 RX ADMIN — PROPOFOL 50 MG: 10 INJECTION, EMULSION INTRAVENOUS at 01:11

## 2021-11-30 RX ADMIN — SODIUM CHLORIDE: 0.9 INJECTION, SOLUTION INTRAVENOUS at 01:11

## 2021-11-30 RX ADMIN — PROPOFOL 50 MG: 10 INJECTION, EMULSION INTRAVENOUS at 12:11

## 2021-11-30 RX ADMIN — PHENYLEPHRINE HYDROCHLORIDE 200 MCG: 10 INJECTION INTRAVENOUS at 01:11

## 2021-11-30 RX ADMIN — SODIUM CHLORIDE: 0.9 INJECTION, SOLUTION INTRAVENOUS at 12:11

## 2021-11-30 RX ADMIN — ALBUTEROL SULFATE 2.5 MG: 2.5 SOLUTION RESPIRATORY (INHALATION) at 02:11

## 2021-11-30 RX ADMIN — PHENYLEPHRINE HYDROCHLORIDE 100 MCG: 10 INJECTION INTRAVENOUS at 01:11

## 2021-12-03 ENCOUNTER — TELEPHONE (OUTPATIENT)
Dept: SURGERY | Facility: CLINIC | Age: 81
End: 2021-12-03
Payer: MEDICARE

## 2021-12-08 ENCOUNTER — TELEPHONE (OUTPATIENT)
Dept: HEMATOLOGY/ONCOLOGY | Facility: CLINIC | Age: 81
End: 2021-12-08
Payer: MEDICARE

## 2021-12-10 ENCOUNTER — OFFICE VISIT (OUTPATIENT)
Dept: CARDIOLOGY | Facility: CLINIC | Age: 81
End: 2021-12-10
Payer: MEDICARE

## 2021-12-10 VITALS
OXYGEN SATURATION: 99 % | SYSTOLIC BLOOD PRESSURE: 118 MMHG | HEIGHT: 59 IN | RESPIRATION RATE: 16 BRPM | DIASTOLIC BLOOD PRESSURE: 78 MMHG | HEART RATE: 70 BPM | BODY MASS INDEX: 25.8 KG/M2 | WEIGHT: 128 LBS

## 2021-12-10 DIAGNOSIS — K62.1 RECTAL POLYP: ICD-10-CM

## 2021-12-10 DIAGNOSIS — C18.2 CANCER OF ASCENDING COLON: ICD-10-CM

## 2021-12-10 DIAGNOSIS — E66.3 OVERWEIGHT (BMI 25.0-29.9): ICD-10-CM

## 2021-12-10 DIAGNOSIS — I10 PRIMARY HYPERTENSION: ICD-10-CM

## 2021-12-10 DIAGNOSIS — R94.8 ABNORMAL GASTROINTESTINAL PET SCAN: ICD-10-CM

## 2021-12-10 DIAGNOSIS — J41.0 SIMPLE CHRONIC BRONCHITIS: Primary | ICD-10-CM

## 2021-12-10 DIAGNOSIS — D50.0 IRON DEFICIENCY ANEMIA DUE TO CHRONIC BLOOD LOSS: ICD-10-CM

## 2021-12-10 DIAGNOSIS — E78.2 MIXED HYPERLIPIDEMIA: ICD-10-CM

## 2021-12-10 DIAGNOSIS — K76.89 LIVER NODULE: ICD-10-CM

## 2021-12-10 DIAGNOSIS — J40 BRONCHITIS: ICD-10-CM

## 2021-12-10 PROCEDURE — 99204 PR OFFICE/OUTPT VISIT, NEW, LEVL IV, 45-59 MIN: ICD-10-PCS | Mod: S$GLB,,, | Performed by: INTERNAL MEDICINE

## 2021-12-10 PROCEDURE — 99204 OFFICE O/P NEW MOD 45 MIN: CPT | Mod: S$GLB,,, | Performed by: INTERNAL MEDICINE

## 2021-12-14 ENCOUNTER — TELEPHONE (OUTPATIENT)
Dept: CARDIOLOGY | Facility: CLINIC | Age: 81
End: 2021-12-14
Payer: MEDICARE

## 2021-12-14 ENCOUNTER — OFFICE VISIT (OUTPATIENT)
Dept: SURGERY | Facility: CLINIC | Age: 81
End: 2021-12-14
Payer: MEDICARE

## 2021-12-14 VITALS
TEMPERATURE: 99 F | HEART RATE: 63 BPM | WEIGHT: 129.19 LBS | BODY MASS INDEX: 26.04 KG/M2 | HEIGHT: 59 IN | SYSTOLIC BLOOD PRESSURE: 137 MMHG | DIASTOLIC BLOOD PRESSURE: 65 MMHG

## 2021-12-14 DIAGNOSIS — C18.2 MALIGNANT NEOPLASM OF ASCENDING COLON: Primary | ICD-10-CM

## 2021-12-14 PROCEDURE — 99214 OFFICE O/P EST MOD 30 MIN: CPT | Mod: S$GLB,,, | Performed by: SURGERY

## 2021-12-14 PROCEDURE — 99999 PR PBB SHADOW E&M-EST. PATIENT-LVL III: ICD-10-PCS | Mod: PBBFAC,,, | Performed by: SURGERY

## 2021-12-14 PROCEDURE — 99214 PR OFFICE/OUTPT VISIT, EST, LEVL IV, 30-39 MIN: ICD-10-PCS | Mod: S$GLB,,, | Performed by: SURGERY

## 2021-12-14 PROCEDURE — 99999 PR PBB SHADOW E&M-EST. PATIENT-LVL III: CPT | Mod: PBBFAC,,, | Performed by: SURGERY

## 2021-12-14 RX ORDER — METRONIDAZOLE 500 MG/100ML
500 INJECTION, SOLUTION INTRAVENOUS
Status: CANCELLED | OUTPATIENT
Start: 2021-12-14

## 2021-12-14 RX ORDER — SODIUM CHLORIDE 9 MG/ML
INJECTION, SOLUTION INTRAVENOUS CONTINUOUS
Status: CANCELLED | OUTPATIENT
Start: 2021-12-14

## 2021-12-14 NOTE — H&P (VIEW-ONLY)
Subjective:       Patient ID: Teressa Benson is a 81 y.o. female.    Chief Complaint: Consult (Rectal mass/Needs rt hemicolectomy)    HPI  this is a pleasant 81-year-old female whom I have seen in the past.  Patient was originally seen by me in mid November.  This was done after she had a colonoscopy in late October.  At the colonoscopy there was a mass which was confirmed to be adenocarcinoma within the ascending colon.  There was also a large polyp within the rectum which was consistent with adenomatous features with possibility of malignancy.  Patient had workup with no evidence of metastatic disease.  She later had endoscopic resection of the rectal mass with no evidence of malignancy appreciated.  She is now referred to me for definitive management of the ascending colon mass.  Her past medical history significant for COPD hypertension and arthritis.  Past surgical history significant for appendectomy hysterectomy and cholecystectomy.  Review of Systems   Constitutional: Negative for activity change and appetite change.   Respiratory: Negative for apnea and chest tightness.    Cardiovascular: Negative for chest pain and claudication.   Gastrointestinal: Negative for abdominal distention, abdominal pain, rectal pain and vomiting.   Musculoskeletal: Negative for arthralgias and back pain.   Hematological: Negative for adenopathy. Does not bruise/bleed easily.   Psychiatric/Behavioral: Negative for agitation and decreased concentration.         Objective:      Physical Exam  Vitals reviewed.   Cardiovascular:      Rate and Rhythm: Normal rate.      Pulses: Normal pulses.   Pulmonary:      Effort: Pulmonary effort is normal. No respiratory distress.   Abdominal:      General: Abdomen is flat. Bowel sounds are normal. There is no distension.      Tenderness: There is no abdominal tenderness.   Neurological:      General: No focal deficit present.      Mental Status: She is alert.   Psychiatric:         Behavior:  Behavior normal.         Assessment:     colon cancer  Problem List Items Addressed This Visit    None         Plan:       Discussion with the patient.  Also discussed with her daughter.  I have recommended laparoscopic right hemicolectomy.  Risks of the procedure were discussed with the patient in detail.  Informed consent has been obtained.  Surgery scheduled tentatively for Monday December 20th.

## 2021-12-17 ENCOUNTER — ANESTHESIA EVENT (OUTPATIENT)
Dept: SURGERY | Facility: HOSPITAL | Age: 81
DRG: 331 | End: 2021-12-17
Payer: MEDICARE

## 2021-12-17 ENCOUNTER — HOSPITAL ENCOUNTER (OUTPATIENT)
Dept: PREADMISSION TESTING | Facility: HOSPITAL | Age: 81
Discharge: HOME | DRG: 331 | End: 2021-12-17
Attending: SURGERY
Payer: MEDICARE

## 2021-12-17 VITALS — WEIGHT: 128 LBS | HEIGHT: 59 IN | BODY MASS INDEX: 25.8 KG/M2

## 2021-12-17 DIAGNOSIS — C18.2 MALIGNANT NEOPLASM OF ASCENDING COLON: ICD-10-CM

## 2021-12-17 LAB
ABO + RH BLD: NORMAL
BLD GP AB SCN CELLS X3 SERPL QL: NORMAL

## 2021-12-17 PROCEDURE — 36415 COLL VENOUS BLD VENIPUNCTURE: CPT | Performed by: SURGERY

## 2021-12-17 PROCEDURE — 99900103 DSU ONLY-NO CHARGE-INITIAL HR (STAT)

## 2021-12-17 PROCEDURE — 99900104 DSU ONLY-NO CHARGE-EA ADD'L HR (STAT)

## 2021-12-17 PROCEDURE — 86900 BLOOD TYPING SEROLOGIC ABO: CPT | Performed by: SURGERY

## 2021-12-20 ENCOUNTER — ANESTHESIA (OUTPATIENT)
Dept: SURGERY | Facility: HOSPITAL | Age: 81
DRG: 331 | End: 2021-12-20
Payer: MEDICARE

## 2021-12-20 ENCOUNTER — HOSPITAL ENCOUNTER (INPATIENT)
Facility: HOSPITAL | Age: 81
LOS: 4 days | Discharge: HOME | DRG: 331 | End: 2021-12-24
Attending: SURGERY | Admitting: SURGERY
Payer: MEDICARE

## 2021-12-20 DIAGNOSIS — C18.2 MALIGNANT NEOPLASM OF ASCENDING COLON: ICD-10-CM

## 2021-12-20 DIAGNOSIS — C18.2 CANCER OF ASCENDING COLON: Primary | ICD-10-CM

## 2021-12-20 PROCEDURE — 12032 INTMD RPR S/A/T/EXT 2.6-7.5: CPT | Mod: 59,,, | Performed by: SURGERY

## 2021-12-20 PROCEDURE — 36000710: Performed by: SURGERY

## 2021-12-20 PROCEDURE — D9220A PRA ANESTHESIA: Mod: CRNA,,, | Performed by: NURSE ANESTHETIST, CERTIFIED REGISTERED

## 2021-12-20 PROCEDURE — 88341 IMHCHEM/IMCYTCHM EA ADD ANTB: CPT | Mod: 26,,, | Performed by: PATHOLOGY

## 2021-12-20 PROCEDURE — 25000003 PHARM REV CODE 250: Performed by: SURGERY

## 2021-12-20 PROCEDURE — 99900104 DSU ONLY-NO CHARGE-EA ADD'L HR (STAT): Performed by: SURGERY

## 2021-12-20 PROCEDURE — 88304 PR  SURG PATH,LEVEL III: ICD-10-PCS | Mod: 26,,, | Performed by: PATHOLOGY

## 2021-12-20 PROCEDURE — 88309 TISSUE EXAM BY PATHOLOGIST: CPT | Performed by: PATHOLOGY

## 2021-12-20 PROCEDURE — 71000039 HC RECOVERY, EACH ADD'L HOUR: Performed by: SURGERY

## 2021-12-20 PROCEDURE — 27201423 OPTIME MED/SURG SUP & DEVICES STERILE SUPPLY: Performed by: SURGERY

## 2021-12-20 PROCEDURE — 37000008 HC ANESTHESIA 1ST 15 MINUTES: Performed by: SURGERY

## 2021-12-20 PROCEDURE — 94760 N-INVAS EAR/PLS OXIMETRY 1: CPT

## 2021-12-20 PROCEDURE — 25000003 PHARM REV CODE 250: Performed by: NURSE ANESTHETIST, CERTIFIED REGISTERED

## 2021-12-20 PROCEDURE — 63600175 PHARM REV CODE 636 W HCPCS: Performed by: NURSE ANESTHETIST, CERTIFIED REGISTERED

## 2021-12-20 PROCEDURE — 88341 PR IHC OR ICC EACH ADD'L SINGLE ANTIBODY  STAINPR: ICD-10-PCS | Mod: 26,,, | Performed by: PATHOLOGY

## 2021-12-20 PROCEDURE — 88341 IMHCHEM/IMCYTCHM EA ADD ANTB: CPT | Performed by: PATHOLOGY

## 2021-12-20 PROCEDURE — D9220A PRA ANESTHESIA: Mod: ANES,,, | Performed by: ANESTHESIOLOGY

## 2021-12-20 PROCEDURE — 12032 PR LAYR CLOS WND TRUNK,ARM,LEG 2.6-7.5 CM: ICD-10-PCS | Mod: 59,,, | Performed by: SURGERY

## 2021-12-20 PROCEDURE — 88309 TISSUE EXAM BY PATHOLOGIST: CPT | Mod: 26,,, | Performed by: PATHOLOGY

## 2021-12-20 PROCEDURE — 12000002 HC ACUTE/MED SURGE SEMI-PRIVATE ROOM

## 2021-12-20 PROCEDURE — 63600175 PHARM REV CODE 636 W HCPCS: Performed by: SURGERY

## 2021-12-20 PROCEDURE — 11402 EXC TR-EXT B9+MARG 1.1-2 CM: CPT | Mod: ,,, | Performed by: SURGERY

## 2021-12-20 PROCEDURE — 88309 PR  SURG PATH,LEVEL VI: ICD-10-PCS | Mod: 26,,, | Performed by: PATHOLOGY

## 2021-12-20 PROCEDURE — 37000009 HC ANESTHESIA EA ADD 15 MINS: Performed by: SURGERY

## 2021-12-20 PROCEDURE — 25000242 PHARM REV CODE 250 ALT 637 W/ HCPCS: Performed by: HOSPITALIST

## 2021-12-20 PROCEDURE — D9220A PRA ANESTHESIA: ICD-10-PCS | Mod: ANES,,, | Performed by: ANESTHESIOLOGY

## 2021-12-20 PROCEDURE — 88342 IMHCHEM/IMCYTCHM 1ST ANTB: CPT | Mod: 26,,, | Performed by: PATHOLOGY

## 2021-12-20 PROCEDURE — 88342 CHG IMMUNOCYTOCHEMISTRY: ICD-10-PCS | Mod: 26,,, | Performed by: PATHOLOGY

## 2021-12-20 PROCEDURE — 27000221 HC OXYGEN, UP TO 24 HOURS

## 2021-12-20 PROCEDURE — 36000711: Performed by: SURGERY

## 2021-12-20 PROCEDURE — 99900103 DSU ONLY-NO CHARGE-INITIAL HR (STAT): Performed by: SURGERY

## 2021-12-20 PROCEDURE — C9290 INJ, BUPIVACAINE LIPOSOME: HCPCS | Performed by: SURGERY

## 2021-12-20 PROCEDURE — 44160 REMOVAL OF COLON: CPT | Mod: ,,, | Performed by: SURGERY

## 2021-12-20 PROCEDURE — 63600175 PHARM REV CODE 636 W HCPCS: Performed by: ANESTHESIOLOGY

## 2021-12-20 PROCEDURE — 25000003 PHARM REV CODE 250: Performed by: ANESTHESIOLOGY

## 2021-12-20 PROCEDURE — 44160 PR REMVL COLON & TERM ILEUM W/ILEOCOLOSTOMY: ICD-10-PCS | Mod: ,,, | Performed by: SURGERY

## 2021-12-20 PROCEDURE — 88304 TISSUE EXAM BY PATHOLOGIST: CPT | Mod: 26,,, | Performed by: PATHOLOGY

## 2021-12-20 PROCEDURE — 71000033 HC RECOVERY, INTIAL HOUR: Performed by: SURGERY

## 2021-12-20 PROCEDURE — 94799 UNLISTED PULMONARY SVC/PX: CPT

## 2021-12-20 PROCEDURE — 88304 TISSUE EXAM BY PATHOLOGIST: CPT | Performed by: PATHOLOGY

## 2021-12-20 PROCEDURE — 88342 IMHCHEM/IMCYTCHM 1ST ANTB: CPT | Performed by: PATHOLOGY

## 2021-12-20 PROCEDURE — D9220A PRA ANESTHESIA: ICD-10-PCS | Mod: CRNA,,, | Performed by: NURSE ANESTHETIST, CERTIFIED REGISTERED

## 2021-12-20 PROCEDURE — 11402 PR EXC SKIN BENIG 1.1-2 CM TRUNK,ARM,LEG: ICD-10-PCS | Mod: ,,, | Performed by: SURGERY

## 2021-12-20 RX ORDER — ONDANSETRON 2 MG/ML
4 INJECTION INTRAMUSCULAR; INTRAVENOUS ONCE AS NEEDED
Status: DISCONTINUED | OUTPATIENT
Start: 2021-12-20 | End: 2021-12-20 | Stop reason: HOSPADM

## 2021-12-20 RX ORDER — DEXTROSE, SODIUM CHLORIDE, SODIUM LACTATE, POTASSIUM CHLORIDE, AND CALCIUM CHLORIDE 5; .6; .31; .03; .02 G/100ML; G/100ML; G/100ML; G/100ML; G/100ML
INJECTION, SOLUTION INTRAVENOUS CONTINUOUS
Status: DISCONTINUED | OUTPATIENT
Start: 2021-12-20 | End: 2021-12-21

## 2021-12-20 RX ORDER — HYDROMORPHONE HYDROCHLORIDE 2 MG/ML
1 INJECTION, SOLUTION INTRAMUSCULAR; INTRAVENOUS; SUBCUTANEOUS EVERY 4 HOURS PRN
Status: DISCONTINUED | OUTPATIENT
Start: 2021-12-20 | End: 2021-12-24 | Stop reason: HOSPADM

## 2021-12-20 RX ORDER — BISACODYL 5 MG
5 TABLET, DELAYED RELEASE (ENTERIC COATED) ORAL NIGHTLY
Status: DISCONTINUED | OUTPATIENT
Start: 2021-12-20 | End: 2021-12-24 | Stop reason: HOSPADM

## 2021-12-20 RX ORDER — METRONIDAZOLE 500 MG/100ML
500 INJECTION, SOLUTION INTRAVENOUS
Status: DISCONTINUED | OUTPATIENT
Start: 2021-12-20 | End: 2021-12-20

## 2021-12-20 RX ORDER — ESMOLOL HYDROCHLORIDE 10 MG/ML
INJECTION INTRAVENOUS
Status: DISCONTINUED | OUTPATIENT
Start: 2021-12-20 | End: 2021-12-20

## 2021-12-20 RX ORDER — HYDROCODONE BITARTRATE AND ACETAMINOPHEN 5; 325 MG/1; MG/1
1 TABLET ORAL EVERY 4 HOURS PRN
Status: DISCONTINUED | OUTPATIENT
Start: 2021-12-20 | End: 2021-12-24 | Stop reason: HOSPADM

## 2021-12-20 RX ORDER — MUPIROCIN 20 MG/G
OINTMENT TOPICAL 2 TIMES DAILY
Status: DISCONTINUED | OUTPATIENT
Start: 2021-12-20 | End: 2021-12-24 | Stop reason: HOSPADM

## 2021-12-20 RX ORDER — LORAZEPAM 2 MG/ML
0.25 INJECTION INTRAMUSCULAR EVERY 4 HOURS PRN
Status: DISCONTINUED | OUTPATIENT
Start: 2021-12-20 | End: 2021-12-24 | Stop reason: HOSPADM

## 2021-12-20 RX ORDER — ROCURONIUM BROMIDE 10 MG/ML
INJECTION, SOLUTION INTRAVENOUS
Status: DISCONTINUED | OUTPATIENT
Start: 2021-12-20 | End: 2021-12-20

## 2021-12-20 RX ORDER — PROPOFOL 10 MG/ML
VIAL (ML) INTRAVENOUS
Status: DISCONTINUED | OUTPATIENT
Start: 2021-12-20 | End: 2021-12-20

## 2021-12-20 RX ORDER — FENTANYL CITRATE 50 UG/ML
INJECTION, SOLUTION INTRAMUSCULAR; INTRAVENOUS
Status: DISCONTINUED | OUTPATIENT
Start: 2021-12-20 | End: 2021-12-20

## 2021-12-20 RX ORDER — HYDROMORPHONE HYDROCHLORIDE 2 MG/ML
INJECTION, SOLUTION INTRAMUSCULAR; INTRAVENOUS; SUBCUTANEOUS
Status: DISCONTINUED | OUTPATIENT
Start: 2021-12-20 | End: 2021-12-20

## 2021-12-20 RX ORDER — ONDANSETRON 2 MG/ML
4 INJECTION INTRAMUSCULAR; INTRAVENOUS ONCE AS NEEDED
Status: DISCONTINUED | OUTPATIENT
Start: 2021-12-20 | End: 2021-12-24 | Stop reason: HOSPADM

## 2021-12-20 RX ORDER — DIPHENHYDRAMINE HYDROCHLORIDE 50 MG/ML
12.5 INJECTION INTRAMUSCULAR; INTRAVENOUS EVERY 6 HOURS PRN
Status: DISCONTINUED | OUTPATIENT
Start: 2021-12-20 | End: 2021-12-20 | Stop reason: SDUPTHER

## 2021-12-20 RX ORDER — IBUPROFEN 600 MG/1
600 TABLET ORAL 4 TIMES DAILY
Status: DISCONTINUED | OUTPATIENT
Start: 2021-12-20 | End: 2021-12-24 | Stop reason: HOSPADM

## 2021-12-20 RX ORDER — ALBUTEROL SULFATE 2.5 MG/.5ML
2.5 SOLUTION RESPIRATORY (INHALATION) EVERY 4 HOURS
Status: DISCONTINUED | OUTPATIENT
Start: 2021-12-20 | End: 2021-12-20

## 2021-12-20 RX ORDER — ACETAMINOPHEN 10 MG/ML
1000 INJECTION, SOLUTION INTRAVENOUS EVERY 6 HOURS
Status: DISPENSED | OUTPATIENT
Start: 2021-12-20 | End: 2021-12-21

## 2021-12-20 RX ORDER — LIDOCAINE HCL/PF 100 MG/5ML
SYRINGE (ML) INTRAVENOUS
Status: DISCONTINUED | OUTPATIENT
Start: 2021-12-20 | End: 2021-12-20

## 2021-12-20 RX ORDER — GABAPENTIN 100 MG/1
200 CAPSULE ORAL 2 TIMES DAILY
Status: DISCONTINUED | OUTPATIENT
Start: 2021-12-20 | End: 2021-12-24 | Stop reason: HOSPADM

## 2021-12-20 RX ORDER — ENOXAPARIN SODIUM 100 MG/ML
40 INJECTION SUBCUTANEOUS EVERY 24 HOURS
Status: DISCONTINUED | OUTPATIENT
Start: 2021-12-21 | End: 2021-12-24 | Stop reason: HOSPADM

## 2021-12-20 RX ORDER — OXYCODONE HYDROCHLORIDE 5 MG/1
5 TABLET ORAL ONCE AS NEEDED
Status: COMPLETED | OUTPATIENT
Start: 2021-12-20 | End: 2021-12-20

## 2021-12-20 RX ORDER — ALBUTEROL SULFATE 90 UG/1
2 AEROSOL, METERED RESPIRATORY (INHALATION) EVERY 6 HOURS PRN
Status: DISCONTINUED | OUTPATIENT
Start: 2021-12-20 | End: 2021-12-24 | Stop reason: HOSPADM

## 2021-12-20 RX ORDER — ACETAMINOPHEN 10 MG/ML
INJECTION, SOLUTION INTRAVENOUS
Status: DISCONTINUED | OUTPATIENT
Start: 2021-12-20 | End: 2021-12-20

## 2021-12-20 RX ORDER — DIPHENHYDRAMINE HYDROCHLORIDE 50 MG/ML
12.5 INJECTION INTRAMUSCULAR; INTRAVENOUS EVERY 4 HOURS PRN
Status: DISCONTINUED | OUTPATIENT
Start: 2021-12-20 | End: 2021-12-24 | Stop reason: HOSPADM

## 2021-12-20 RX ORDER — ALBUTEROL SULFATE 2.5 MG/.5ML
2.5 SOLUTION RESPIRATORY (INHALATION)
Status: DISCONTINUED | OUTPATIENT
Start: 2021-12-20 | End: 2021-12-24 | Stop reason: HOSPADM

## 2021-12-20 RX ORDER — SODIUM CHLORIDE 9 MG/ML
INJECTION, SOLUTION INTRAVENOUS CONTINUOUS
Status: DISCONTINUED | OUTPATIENT
Start: 2021-12-20 | End: 2021-12-20

## 2021-12-20 RX ORDER — DEXAMETHASONE SODIUM PHOSPHATE 4 MG/ML
INJECTION, SOLUTION INTRA-ARTICULAR; INTRALESIONAL; INTRAMUSCULAR; INTRAVENOUS; SOFT TISSUE
Status: DISCONTINUED | OUTPATIENT
Start: 2021-12-20 | End: 2021-12-20

## 2021-12-20 RX ORDER — ONDANSETRON 2 MG/ML
4 INJECTION INTRAMUSCULAR; INTRAVENOUS EVERY 12 HOURS PRN
Status: DISCONTINUED | OUTPATIENT
Start: 2021-12-20 | End: 2021-12-24 | Stop reason: HOSPADM

## 2021-12-20 RX ORDER — FENTANYL CITRATE 50 UG/ML
25 INJECTION, SOLUTION INTRAMUSCULAR; INTRAVENOUS EVERY 5 MIN PRN
Status: COMPLETED | OUTPATIENT
Start: 2021-12-20 | End: 2021-12-20

## 2021-12-20 RX ORDER — BUPIVACAINE HYDROCHLORIDE AND EPINEPHRINE 2.5; 5 MG/ML; UG/ML
INJECTION, SOLUTION EPIDURAL; INFILTRATION; INTRACAUDAL; PERINEURAL
Status: DISCONTINUED | OUTPATIENT
Start: 2021-12-20 | End: 2021-12-20 | Stop reason: HOSPADM

## 2021-12-20 RX ORDER — ONDANSETRON HYDROCHLORIDE 2 MG/ML
INJECTION, SOLUTION INTRAMUSCULAR; INTRAVENOUS
Status: DISCONTINUED | OUTPATIENT
Start: 2021-12-20 | End: 2021-12-20

## 2021-12-20 RX ORDER — LIDOCAINE HYDROCHLORIDE 10 MG/ML
1 INJECTION, SOLUTION EPIDURAL; INFILTRATION; INTRACAUDAL; PERINEURAL ONCE
Status: DISCONTINUED | OUTPATIENT
Start: 2021-12-20 | End: 2021-12-20

## 2021-12-20 RX ORDER — LIDOCAINE HYDROCHLORIDE 10 MG/ML
1 INJECTION, SOLUTION EPIDURAL; INFILTRATION; INTRACAUDAL; PERINEURAL ONCE AS NEEDED
Status: DISCONTINUED | OUTPATIENT
Start: 2021-12-20 | End: 2021-12-20

## 2021-12-20 RX ORDER — SUCCINYLCHOLINE CHLORIDE 20 MG/ML
INJECTION INTRAMUSCULAR; INTRAVENOUS
Status: DISCONTINUED | OUTPATIENT
Start: 2021-12-20 | End: 2021-12-20

## 2021-12-20 RX ADMIN — FENTANYL CITRATE 75 MCG: 50 INJECTION, SOLUTION INTRAMUSCULAR; INTRAVENOUS at 10:12

## 2021-12-20 RX ADMIN — PIPERACILLIN AND TAZOBACTAM 3.38 G: 3; .375 INJECTION, POWDER, LYOPHILIZED, FOR SOLUTION INTRAVENOUS; PARENTERAL at 04:12

## 2021-12-20 RX ADMIN — ESMOLOL HYDROCHLORIDE 10 MG: 10 INJECTION, SOLUTION INTRAVENOUS at 10:12

## 2021-12-20 RX ADMIN — SUCCINYLCHOLINE CHLORIDE 120 MG: 20 INJECTION, SOLUTION INTRAMUSCULAR; INTRAVENOUS; PARENTERAL at 10:12

## 2021-12-20 RX ADMIN — OXYCODONE 5 MG: 5 TABLET ORAL at 12:12

## 2021-12-20 RX ADMIN — DEXAMETHASONE SODIUM PHOSPHATE 4 MG: 4 INJECTION, SOLUTION INTRA-ARTICULAR; INTRALESIONAL; INTRAMUSCULAR; INTRAVENOUS; SOFT TISSUE at 10:12

## 2021-12-20 RX ADMIN — SUGAMMADEX 200 MG: 100 INJECTION, SOLUTION INTRAVENOUS at 11:12

## 2021-12-20 RX ADMIN — PIPERACILLIN AND TAZOBACTAM 2.25 G: 2; .25 INJECTION, POWDER, LYOPHILIZED, FOR SOLUTION INTRAVENOUS; PARENTERAL at 10:12

## 2021-12-20 RX ADMIN — FENTANYL CITRATE 25 MCG: 50 INJECTION INTRAMUSCULAR; INTRAVENOUS at 01:12

## 2021-12-20 RX ADMIN — HYDROCODONE BITARTRATE AND ACETAMINOPHEN 1 TABLET: 5; 325 TABLET ORAL at 06:12

## 2021-12-20 RX ADMIN — FENTANYL CITRATE 25 MCG: 50 INJECTION, SOLUTION INTRAMUSCULAR; INTRAVENOUS at 10:12

## 2021-12-20 RX ADMIN — ONDANSETRON 4 MG: 2 INJECTION, SOLUTION INTRAMUSCULAR; INTRAVENOUS at 10:12

## 2021-12-20 RX ADMIN — DEXTROSE, SODIUM CHLORIDE, SODIUM LACTATE, POTASSIUM CHLORIDE, AND CALCIUM CHLORIDE: 5; .6; .31; .03; .02 INJECTION, SOLUTION INTRAVENOUS at 04:12

## 2021-12-20 RX ADMIN — HYDROMORPHONE HYDROCHLORIDE 1 MG: 2 INJECTION, SOLUTION INTRAMUSCULAR; INTRAVENOUS; SUBCUTANEOUS at 07:12

## 2021-12-20 RX ADMIN — SODIUM CHLORIDE, SODIUM GLUCONATE, SODIUM ACETATE, POTASSIUM CHLORIDE, MAGNESIUM CHLORIDE, SODIUM PHOSPHATE, DIBASIC, AND POTASSIUM PHOSPHATE: .53; .5; .37; .037; .03; .012; .00082 INJECTION, SOLUTION INTRAVENOUS at 09:12

## 2021-12-20 RX ADMIN — BISACODYL 5 MG: 5 TABLET, COATED ORAL at 09:12

## 2021-12-20 RX ADMIN — ACETAMINOPHEN 1000 MG: 10 INJECTION, SOLUTION INTRAVENOUS at 10:12

## 2021-12-20 RX ADMIN — ROCURONIUM BROMIDE 5 MG: 10 INJECTION, SOLUTION INTRAVENOUS at 10:12

## 2021-12-20 RX ADMIN — ROCURONIUM BROMIDE 35 MG: 10 INJECTION, SOLUTION INTRAVENOUS at 10:12

## 2021-12-20 RX ADMIN — GABAPENTIN 200 MG: 100 CAPSULE ORAL at 09:12

## 2021-12-20 RX ADMIN — HYDROMORPHONE HYDROCHLORIDE 0.4 MG: 2 INJECTION INTRAMUSCULAR; INTRAVENOUS; SUBCUTANEOUS at 11:12

## 2021-12-20 RX ADMIN — ESMOLOL HYDROCHLORIDE 30 MG: 10 INJECTION, SOLUTION INTRAVENOUS at 10:12

## 2021-12-20 RX ADMIN — PROPOFOL 80 MG: 10 INJECTION, EMULSION INTRAVENOUS at 10:12

## 2021-12-20 RX ADMIN — PIPERACILLIN AND TAZOBACTAM 3.38 G: 3; .375 INJECTION, POWDER, LYOPHILIZED, FOR SOLUTION INTRAVENOUS; PARENTERAL at 09:12

## 2021-12-20 RX ADMIN — MUPIROCIN: 20 OINTMENT TOPICAL at 09:12

## 2021-12-20 RX ADMIN — ACETAMINOPHEN 1000 MG: 1000 INJECTION, SOLUTION INTRAVENOUS at 04:12

## 2021-12-20 RX ADMIN — ALBUTEROL SULFATE 2.5 MG: 2.5 SOLUTION RESPIRATORY (INHALATION) at 07:12

## 2021-12-20 RX ADMIN — ROCURONIUM BROMIDE 10 MG: 10 INJECTION, SOLUTION INTRAVENOUS at 10:12

## 2021-12-20 RX ADMIN — LIDOCAINE HYDROCHLORIDE 100 MG: 20 INJECTION INTRAVENOUS at 10:12

## 2021-12-20 NOTE — ASSESSMENT & PLAN NOTE
S/p Laparoscopic R hemicolectomy with ileocolic anastomosis with Dr. Harrison 12/20  Monitor H/H  Incentive spirometry  Pain control  Nausea control  Follow surgery recommendations

## 2021-12-20 NOTE — PLAN OF CARE
Patient prepared for procedure, no questions at this time, daughters  at bedside, holding on to belongings.

## 2021-12-20 NOTE — H&P
[facility] Medicine  History & Physical    Patient Name: Teressa Benson  MRN: [id number]  Patient Class: IP- Inpatient  Admission Date: 12/20/2021  Attending Physician: Lyssa Long MD  Primary Care Provider: Crow Bustos MD         Patient information was obtained from patient, relative(s) and past medical records.     Subjective:     Principal Problem:Cancer of ascending colon    Chief Complaint: No chief complaint on file.       HPI: Patient is an 81F with history of COPD, HTN, arthritis who is seen today after laparoscopic R hemicolectomy with ileocolic anastomosis and excision of cyst from abdominal wall ( 2 cm) by Dr. Harrison. Colonoscopy in October showed mass in the ascending colon which was confirmed to be adenocarcinoma. That mass was removed during surgery today. Patient reports her pain is controlled at this time. Denies nausea or vomiting. Reports a sore throat after surgery which I explained can be from the ETT. She denies shortness of breath, le edema, or other complaints. She is accompanied by her 3 daughters.            Past Medical History:   Diagnosis Date    Abnormal gastrointestinal PET scan 11/4/2021    Allergy     Arthritis     Asthma     Cancer of ascending colon 11/4/2021    Cataract     COPD (chronic obstructive pulmonary disease)     Diverticulitis     DJD (degenerative joint disease)     Full dentures     [place] (hard of hearing)     BILAT AIDS    Hypertension     IGT (impaired glucose tolerance)     Iron deficiency anemia due to chronic blood loss 11/4/2021    Liver nodule 11/4/2021    Other and unspecified hyperlipidemia     Rectal cancer 11/4/2021    Squamous cell carcinoma of skin 2020    left inner thigh    Urinary tract infection        Past Surgical History:   Procedure Laterality Date    APPENDECTOMY      CHOLECYSTECTOMY      COLONOSCOPY N/A 11/30/2021    Procedure: COLONOSCOPY WITH ENDOSCOPIC MUCOSAL RESECTION;  Surgeon:  Raul Morales III, MD;  Location: [facility];  Service: Endoscopy;  Laterality: N/A;    COLONOSCOPY W/ POLYPECTOMY  11/30/2021    ENDOSCOPIC ULTRASOUND OF UPPER GASTROINTESTINAL TRACT  11/30/2021    ENDOSCOPIC ULTRASOUND OF UPPER GASTROINTESTINAL TRACT N/A 11/30/2021    Procedure: ULTRASOUND, UPPER GI TRACT, ENDOSCOPIC;  Surgeon: Raul Morales III, MD;  Location: [facility];  Service: Endoscopy;  Laterality: N/A;    EYE SURGERY      HYSTERECTOMY      TONSILLECTOMY, ADENOIDECTOMY         Review of patient's allergies indicates:   Allergen Reactions    Demerol [meperidine] Other (See Comments)     ams    Phenergan [promethazine] Other (See Comments)     ams       No current facility-administered medications on file prior to encounter.     Current Outpatient Medications on File Prior to Encounter   Medication Sig    albuterol (PROVENTIL) 2.5 mg /3 mL (0.083 %) nebulizer solution Take 2.5 mg by nebulization every 6 (six) hours as needed for Wheezing. Rescue   USES TID    albuterol 90 mcg/actuation inhaler Inhale 2 puffs into the lungs every 6 (six) hours as needed for Wheezing. Rescue  every four to six hours as needed    enalapril (VASOTEC) 10 MG tablet TAKE ONE TABLET BY MOUTH ONCE DAILY    montelukast (SINGULAIR) 10 mg tablet TAKE ONE TABLET BY MOUTH ONCE DAILY (Patient taking differently: every evening.)    simvastatin (ZOCOR) 20 MG tablet TAKE ONE TABLET BY MOUTH EVERY EVENING. (Patient taking differently: every evening.)     Family History     Problem Relation (Age of Onset)    Asthma Mother    Heart failure Father        Tobacco Use    Smoking status: Former Smoker     Types: Cigarettes    Smokeless tobacco: Never Used   Substance and Sexual Activity    Alcohol use: No    Drug use: No    Sexual activity: Not on file     Review of Systems   Constitutional: Negative for chills and fever.   HENT: Positive for sore throat. Negative for congestion and rhinorrhea.    Respiratory: Negative  for cough, shortness of breath and wheezing.    Cardiovascular: Negative for chest pain, palpitations and leg swelling.   Gastrointestinal: Positive for abdominal pain (mild). Negative for abdominal distention, nausea and vomiting.   Endocrine: Negative for cold intolerance and heat intolerance.   Genitourinary: Negative for dysuria and urgency.   Musculoskeletal: Negative for arthralgias and neck stiffness.   Skin: Negative for rash and wound.   Neurological: Negative for dizziness and weakness.     Objective:     Vital Signs (Most Recent):  Temp: 97.7 °F (36.5 °C) (12/20/21 1349)  Pulse: 80 (12/20/21 1349)  Resp: 16 (12/20/21 1349)  BP: 134/60 (12/20/21 1349)  SpO2: 96 % (12/20/21 1349) Vital Signs (24h Range):  Temp:  [97.7 °F (36.5 °C)-98.1 °F (36.7 °C)] 97.7 °F (36.5 °C)  Pulse:  [75-88] 80  Resp:  [11-21] 16  SpO2:  [95 %-99 %] 96 %  BP: (118-155)/(52-67) 134/60     Weight: 58.1 kg (128 lb)  Body mass index is 25.85 kg/m².    Physical Exam  Constitutional:       General: She is not in acute distress.     Appearance: She is well-developed and well-nourished.   HENT:      Head: Normocephalic and atraumatic.   Eyes:      Extraocular Movements: EOM normal.      Pupils: Pupils are equal, round, and reactive to light.   Cardiovascular:      Rate and Rhythm: Normal rate and regular rhythm.      Heart sounds: No murmur heard.      Pulmonary:      Effort: Pulmonary effort is normal. No respiratory distress.      Breath sounds: Normal breath sounds. No wheezing or rales.   Abdominal:      General: There is no distension.      Palpations: Abdomen is soft.      Tenderness: There is no abdominal tenderness.      Comments: Bandages c/d/i   Musculoskeletal:         General: No edema. Normal range of motion.   Skin:     General: Skin is warm and dry.      Findings: No rash.   Neurological:      Mental Status: She is alert and oriented to person, place, and time.      Cranial Nerves: No cranial nerve deficit.   Psychiatric:          Mood and Affect: Mood and affect normal.         Behavior: Behavior normal.           CRANIAL NERVES     CN III, IV, VI   Pupils are equal, round, and reactive to light.  Extraocular motions are normal.        Significant Labs: All pertinent labs within the past 24 hours have been reviewed.    Significant Imaging: I have reviewed all pertinent imaging results/findings within the past 24 hours.    Assessment/Plan:     * Cancer of ascending colon  S/p Laparoscopic R hemicolectomy with ileocolic anastomosis with Dr. Harrison 12/20  Monitor H/H  Incentive spirometry  Pain control  Nausea control  Follow surgery recommendations      Mixed hyperlipidemia  Hold statin for now      Hypertension  Restart lisinopril in AM      COPD (chronic obstructive pulmonary disease)  PRN albuterol for SOB/Wheezing  Hold home Singulair for now        VTE Risk Mitigation (From admission, onward)         Ordered     enoxaparin injection 40 mg  Daily         12/20/21 1350     IP VTE HIGH RISK PATIENT  Once         12/20/21 1350     Place sequential compression device  Until discontinued         12/20/21 1350     Place sequential compression device  Until discontinued         12/20/21 1350                   Lyssa Long MD  Department of Hospital Medicine   [facility]

## 2021-12-20 NOTE — OP NOTE
Date of Surgery:  12/20/21    Staff Surgeon:  Dr Aniceto Harrison    Assistant:  Dr Holley Moran, PGY 5    Becki Richardsisiah RNFA    Preoperative Diagnosis: colon cancer    Postoperative Diagnosis: same    Procedure: Laparoscopic R hemicolectomy with ileocolic anastomosis  Excision of cyst from abdominal wall ( 2 cm)    Anesthesia;  GETA    Indication for procedure:  Pleasant 80 yo referred to me following colonoscopy where it was discovered that she had a malignant mass in the ascending colon.  No evidence of metastatic disease and pt scheduled for surgery on the above mentioned datee.    Description of procedure:     Following signing of informed consent patient was taken to the operating room and placed in supine position.  General anesthesia administered without even.  Pemberton cathter placed.  Abdomen prepped and draped in standard fashion.  An appropriate time out procedure performed without event.  Small vertical incision made just above the umbilicus.  Veress needled used to enter the abdominal cavity and establish pneumoperitoneum to 15 mm HG.  Optiview trocar then inserted under direct visualization.  Tattoo noted in right upper quadrant.  Two 5 mm trocars placed in Left midclavicular line.  Patient placed in trendelenberg with tilt to the Left.  The ileocolic pedical is isolated.  Defect made in mesentery at the base of the pedical.  Dissection performed in medial to lateral direction.  Duodenum identified and swept posteriorly into the retroperitoneum.  Medial to lateral dissection performed and white line of toldt is fully mobilized.  High ligation then performed of the ileocolic vessel. I then take down the line of toldt and perform complete mobilization of the ascending colon and complete release of the hepatic flexure.  Next the mesentery to the small bowel was ligated with enseal device.  Next a small 6 cm incision made in midline of abdominal wall.  Incision made larger in order to facilitate complete  excision of cyst in the abdominal wall.  Cyst removed without event.  Fascia in midline opened without event.  Javy wound retractor placed without event.  Mobilized colon externalized.  The small bowel in the terminal ileum was next stapled across without event.  The externalized colon was stapled across and the specimen was sent off the field.  Distal margin was in proximal transverse colon just proximal to Right branch off of the middle colic.  Next and enterotomy and colotomy are made and a stapled side to side anastomosis performed with ANG 75.  Common enterotomy was then stapled close.  All staple lines oversewn.  Mestenric defect is over sewn with vicryl suture.  Stitch placed in the crotch of the anastomosis.  Fascia then closed with heavy prolene suture.  Skin incision closed with 4 0 vicryl.  Patient was then extubated and taken to the recovery room in stable condition.  BLood loss was 25 cc's.  No immediate complications.

## 2021-12-20 NOTE — BRIEF OP NOTE
[facility]  Brief Operative Note    SUMMARY     Surgery Date: 12/20/2021     Surgeon(s) and Role:     * Aniceto Harrison MD - Primary    Assisting Surgeon: Dr Holley Moran, PGY 5  Dorcas Bartholomew RNFA      Pre-op Diagnosis:  Malignant neoplasm of ascending colon [C18.2]    Post-op Diagnosis:  Post-Op Diagnosis Codes:     * Malignant neoplasm of ascending colon [C18.2]    Procedure(s) (LRB):  COLECTOMY, LAPAROSCOPIC  ileocolic anastamosis (Right)   REmoval of soft tissue mass from abdominal wall    Anesthesia: General    Operative Findings: Mass in ascending colon.  Removed without event.  Excision of 2 cm cyst like lesion from abdominal wall    Estimated Blood Loss: 25 cc's    Estimated Blood Loss has been documented.         Specimens:   Specimen (24h ago, onward)             Start     Ordered    12/20/21 1130  Specimen to Pathology, Surgery General Surgery  Once        Comments: Pre-op Diagnosis: Malignant neoplasm of ascending colon [C18.2]    Procedure(s):  COLECTOMY, LAPAROSCOPIC  ileocolic anastamosis     Number of specimens: 2    Name of specimens: #1 abdomen skin cyst, #2 right colon   References:    Click here for ordering Quick Tip   Question Answer Comment   Procedure Type: General Surgery    Specimen Class: Routine/Screening    Release to patient Immediate        12/20/21 1130                [id number]

## 2021-12-20 NOTE — NURSING
Patient received from surgery. Patient aler and oriented X4. O2 @ 2 liters N/C in use. Scd's intact to lower extremities bilateral. Pemberton intact with yellow urine. No complaints of pain voiced. Daughters at the bedside.

## 2021-12-20 NOTE — SUBJECTIVE & OBJECTIVE
Past Medical History:   Diagnosis Date    Abnormal gastrointestinal PET scan 11/4/2021    Allergy     Arthritis     Asthma     Cancer of ascending colon 11/4/2021    Cataract     COPD (chronic obstructive pulmonary disease)     Diverticulitis     DJD (degenerative joint disease)     Full dentures     [place] (hard of hearing)     BILAT AIDS    Hypertension     IGT (impaired glucose tolerance)     Iron deficiency anemia due to chronic blood loss 11/4/2021    Liver nodule 11/4/2021    Other and unspecified hyperlipidemia     Rectal cancer 11/4/2021    Squamous cell carcinoma of skin 2020    left inner thigh    Urinary tract infection        Past Surgical History:   Procedure Laterality Date    APPENDECTOMY      CHOLECYSTECTOMY      COLONOSCOPY N/A 11/30/2021    Procedure: COLONOSCOPY WITH ENDOSCOPIC MUCOSAL RESECTION;  Surgeon: Raul Morales III, MD;  Location: [facility] ENDO;  Service: Endoscopy;  Laterality: N/A;    COLONOSCOPY W/ POLYPECTOMY  11/30/2021    ENDOSCOPIC ULTRASOUND OF UPPER GASTROINTESTINAL TRACT  11/30/2021    ENDOSCOPIC ULTRASOUND OF UPPER GASTROINTESTINAL TRACT N/A 11/30/2021    Procedure: ULTRASOUND, UPPER GI TRACT, ENDOSCOPIC;  Surgeon: Raul Morales III, MD;  Location: [facility] ENDO;  Service: Endoscopy;  Laterality: N/A;    EYE SURGERY      HYSTERECTOMY      TONSILLECTOMY, ADENOIDECTOMY         Review of patient's allergies indicates:   Allergen Reactions    Demerol [meperidine] Other (See Comments)     ams    Phenergan [promethazine] Other (See Comments)     ams       No current facility-administered medications on file prior to encounter.     Current Outpatient Medications on File Prior to Encounter   Medication Sig    albuterol (PROVENTIL) 2.5 mg /3 mL (0.083 %) nebulizer solution Take 2.5 mg by nebulization every 6 (six) hours as needed for Wheezing. Rescue   USES TID    albuterol 90 mcg/actuation inhaler Inhale 2 puffs into the lungs every 6 (six) hours as  needed for Wheezing. Rescue  every four to six hours as needed    enalapril (VASOTEC) 10 MG tablet TAKE ONE TABLET BY MOUTH ONCE DAILY    montelukast (SINGULAIR) 10 mg tablet TAKE ONE TABLET BY MOUTH ONCE DAILY (Patient taking differently: every evening.)    simvastatin (ZOCOR) 20 MG tablet TAKE ONE TABLET BY MOUTH EVERY EVENING. (Patient taking differently: every evening.)     Family History     Problem Relation (Age of Onset)    Asthma Mother    Heart failure Father        Tobacco Use    Smoking status: Former Smoker     Types: Cigarettes    Smokeless tobacco: Never Used   Substance and Sexual Activity    Alcohol use: No    Drug use: No    Sexual activity: Not on file     Review of Systems   Constitutional: Negative for chills and fever.   HENT: Positive for sore throat. Negative for congestion and rhinorrhea.    Respiratory: Negative for cough, shortness of breath and wheezing.    Cardiovascular: Negative for chest pain, palpitations and leg swelling.   Gastrointestinal: Positive for abdominal pain (mild). Negative for abdominal distention, nausea and vomiting.   Endocrine: Negative for cold intolerance and heat intolerance.   Genitourinary: Negative for dysuria and urgency.   Musculoskeletal: Negative for arthralgias and neck stiffness.   Skin: Negative for rash and wound.   Neurological: Negative for dizziness and weakness.     Objective:     Vital Signs (Most Recent):  Temp: 97.7 °F (36.5 °C) (12/20/21 1349)  Pulse: 80 (12/20/21 1349)  Resp: 16 (12/20/21 1349)  BP: 134/60 (12/20/21 1349)  SpO2: 96 % (12/20/21 1349) Vital Signs (24h Range):  Temp:  [97.7 °F (36.5 °C)-98.1 °F (36.7 °C)] 97.7 °F (36.5 °C)  Pulse:  [75-88] 80  Resp:  [11-21] 16  SpO2:  [95 %-99 %] 96 %  BP: (118-155)/(52-67) 134/60     Weight: 58.1 kg (128 lb)  Body mass index is 25.85 kg/m².    Physical Exam  Constitutional:       General: She is not in acute distress.     Appearance: She is well-developed and well-nourished.   HENT:       Head: Normocephalic and atraumatic.   Eyes:      Extraocular Movements: EOM normal.      Pupils: Pupils are equal, round, and reactive to light.   Cardiovascular:      Rate and Rhythm: Normal rate and regular rhythm.      Heart sounds: No murmur heard.      Pulmonary:      Effort: Pulmonary effort is normal. No respiratory distress.      Breath sounds: Normal breath sounds. No wheezing or rales.   Abdominal:      General: There is no distension.      Palpations: Abdomen is soft.      Tenderness: There is no abdominal tenderness.      Comments: Bandages c/d/i   Musculoskeletal:         General: No edema. Normal range of motion.   Skin:     General: Skin is warm and dry.      Findings: No rash.   Neurological:      Mental Status: She is alert and oriented to person, place, and time.      Cranial Nerves: No cranial nerve deficit.   Psychiatric:         Mood and Affect: Mood and affect normal.         Behavior: Behavior normal.           CRANIAL NERVES     CN III, IV, VI   Pupils are equal, round, and reactive to light.  Extraocular motions are normal.        Significant Labs: All pertinent labs within the past 24 hours have been reviewed.    Significant Imaging: I have reviewed all pertinent imaging results/findings within the past 24 hours.

## 2021-12-20 NOTE — PLAN OF CARE
Patient stable, awake and alert. Pain controlled, denies nausea. Met transfer criteria per Anesthesiologist. Vitals stable. Pemberton cath intact, urine yellow and clear. Dressing clean dry and intact. Patient transported to room 418.  Bedside report given to Florina.

## 2021-12-20 NOTE — HPI
Patient is an 81F with history of COPD, HTN, arthritis who is seen today after laparoscopic R hemicolectomy with ileocolic anastomosis and excision of cyst from abdominal wall ( 2 cm) by Dr. Harrison. Colonoscopy in October showed mass in the ascending colon which was confirmed to be adenocarcinoma. That mass was removed during surgery today. Patient reports her pain is controlled at this time. Denies nausea or vomiting. Reports a sore throat after surgery which I explained can be from the ETT. She denies shortness of breath, le edema, or other complaints. She is accompanied by her 3 daughters.

## 2021-12-21 LAB
ABO + RH BLD: NORMAL
ANION GAP SERPL CALC-SCNC: 12 MMOL/L (ref 8–16)
BASOPHILS # BLD AUTO: 0.02 K/UL (ref 0–0.2)
BASOPHILS NFR BLD: 0.2 % (ref 0–1.9)
BLD GP AB SCN CELLS X3 SERPL QL: NORMAL
BLD PROD TYP BPU: NORMAL
BLD PROD TYP BPU: NORMAL
BLOOD UNIT EXPIRATION DATE: NORMAL
BLOOD UNIT EXPIRATION DATE: NORMAL
BLOOD UNIT TYPE CODE: 5100
BLOOD UNIT TYPE CODE: 5100
BLOOD UNIT TYPE: NORMAL
BLOOD UNIT TYPE: NORMAL
BUN SERPL-MCNC: 7 MG/DL (ref 8–23)
CALCIUM SERPL-MCNC: 8 MG/DL (ref 8.7–10.5)
CHLORIDE SERPL-SCNC: 103 MMOL/L (ref 95–110)
CO2 SERPL-SCNC: 21 MMOL/L (ref 23–29)
CODING SYSTEM: NORMAL
CODING SYSTEM: NORMAL
CREAT SERPL-MCNC: 0.6 MG/DL (ref 0.5–1.4)
DIFFERENTIAL METHOD: ABNORMAL
DISPENSE STATUS: NORMAL
DISPENSE STATUS: NORMAL
EOSINOPHIL # BLD AUTO: 0 K/UL (ref 0–0.5)
EOSINOPHIL NFR BLD: 0 % (ref 0–8)
ERYTHROCYTE [DISTWIDTH] IN BLOOD BY AUTOMATED COUNT: 17.1 % (ref 11.5–14.5)
EST. GFR  (AFRICAN AMERICAN): >60 ML/MIN/1.73 M^2
EST. GFR  (NON AFRICAN AMERICAN): >60 ML/MIN/1.73 M^2
GLUCOSE SERPL-MCNC: 138 MG/DL (ref 70–110)
HCT VFR BLD AUTO: 23.5 % (ref 37–48.5)
HGB BLD-MCNC: 6.7 G/DL (ref 12–16)
IMM GRANULOCYTES # BLD AUTO: 0.07 K/UL (ref 0–0.04)
IMM GRANULOCYTES NFR BLD AUTO: 0.5 % (ref 0–0.5)
LYMPHOCYTES # BLD AUTO: 1.5 K/UL (ref 1–4.8)
LYMPHOCYTES NFR BLD: 11.7 % (ref 18–48)
MCH RBC QN AUTO: 22.3 PG (ref 27–31)
MCHC RBC AUTO-ENTMCNC: 28.5 G/DL (ref 32–36)
MCV RBC AUTO: 78 FL (ref 82–98)
MONOCYTES # BLD AUTO: 1 K/UL (ref 0.3–1)
MONOCYTES NFR BLD: 7.5 % (ref 4–15)
NEUTROPHILS # BLD AUTO: 10.5 K/UL (ref 1.8–7.7)
NEUTROPHILS NFR BLD: 80.1 % (ref 38–73)
NRBC BLD-RTO: 0 /100 WBC
NUM UNITS TRANS PACKED RBC: NORMAL
NUM UNITS TRANS PACKED RBC: NORMAL
PLATELET # BLD AUTO: 325 K/UL (ref 150–450)
PMV BLD AUTO: 9.5 FL (ref 9.2–12.9)
POTASSIUM SERPL-SCNC: 4.3 MMOL/L (ref 3.5–5.1)
RBC # BLD AUTO: 3.01 M/UL (ref 4–5.4)
SODIUM SERPL-SCNC: 136 MMOL/L (ref 136–145)
WBC # BLD AUTO: 13.12 K/UL (ref 3.9–12.7)

## 2021-12-21 PROCEDURE — 94799 UNLISTED PULMONARY SVC/PX: CPT

## 2021-12-21 PROCEDURE — 86901 BLOOD TYPING SEROLOGIC RH(D): CPT | Performed by: NURSE PRACTITIONER

## 2021-12-21 PROCEDURE — 25000003 PHARM REV CODE 250: Performed by: SURGERY

## 2021-12-21 PROCEDURE — 85025 COMPLETE CBC W/AUTO DIFF WBC: CPT | Performed by: SURGERY

## 2021-12-21 PROCEDURE — 94640 AIRWAY INHALATION TREATMENT: CPT

## 2021-12-21 PROCEDURE — 12000002 HC ACUTE/MED SURGE SEMI-PRIVATE ROOM

## 2021-12-21 PROCEDURE — 25000003 PHARM REV CODE 250: Performed by: NURSE PRACTITIONER

## 2021-12-21 PROCEDURE — 63600175 PHARM REV CODE 636 W HCPCS: Performed by: SURGERY

## 2021-12-21 PROCEDURE — 86920 COMPATIBILITY TEST SPIN: CPT | Performed by: NURSE PRACTITIONER

## 2021-12-21 PROCEDURE — P9016 RBC LEUKOCYTES REDUCED: HCPCS | Performed by: NURSE PRACTITIONER

## 2021-12-21 PROCEDURE — 25000242 PHARM REV CODE 250 ALT 637 W/ HCPCS: Performed by: HOSPITALIST

## 2021-12-21 PROCEDURE — 25000003 PHARM REV CODE 250: Performed by: HOSPITALIST

## 2021-12-21 PROCEDURE — 80048 BASIC METABOLIC PNL TOTAL CA: CPT | Performed by: SURGERY

## 2021-12-21 PROCEDURE — 36415 COLL VENOUS BLD VENIPUNCTURE: CPT | Performed by: NURSE PRACTITIONER

## 2021-12-21 PROCEDURE — 86900 BLOOD TYPING SEROLOGIC ABO: CPT | Performed by: NURSE PRACTITIONER

## 2021-12-21 PROCEDURE — 36430 TRANSFUSION BLD/BLD COMPNT: CPT

## 2021-12-21 PROCEDURE — 36415 COLL VENOUS BLD VENIPUNCTURE: CPT | Performed by: SURGERY

## 2021-12-21 PROCEDURE — 94761 N-INVAS EAR/PLS OXIMETRY MLT: CPT

## 2021-12-21 PROCEDURE — 27000221 HC OXYGEN, UP TO 24 HOURS

## 2021-12-21 RX ORDER — MONTELUKAST SODIUM 10 MG/1
10 TABLET ORAL NIGHTLY
Status: DISCONTINUED | OUTPATIENT
Start: 2021-12-21 | End: 2021-12-24 | Stop reason: HOSPADM

## 2021-12-21 RX ORDER — ENALAPRIL MALEATE 10 MG/1
10 TABLET ORAL DAILY
Status: DISCONTINUED | OUTPATIENT
Start: 2021-12-22 | End: 2021-12-24 | Stop reason: HOSPADM

## 2021-12-21 RX ORDER — HYDROCODONE BITARTRATE AND ACETAMINOPHEN 500; 5 MG/1; MG/1
TABLET ORAL
Status: DISCONTINUED | OUTPATIENT
Start: 2021-12-21 | End: 2021-12-24 | Stop reason: HOSPADM

## 2021-12-21 RX ORDER — FAMOTIDINE 20 MG/1
20 TABLET, FILM COATED ORAL 2 TIMES DAILY
Status: DISCONTINUED | OUTPATIENT
Start: 2021-12-21 | End: 2021-12-22

## 2021-12-21 RX ORDER — ATORVASTATIN CALCIUM 10 MG/1
10 TABLET, FILM COATED ORAL NIGHTLY
Status: DISCONTINUED | OUTPATIENT
Start: 2021-12-21 | End: 2021-12-24 | Stop reason: HOSPADM

## 2021-12-21 RX ORDER — ACETAMINOPHEN 325 MG/1
650 TABLET ORAL EVERY 4 HOURS PRN
Status: DISCONTINUED | OUTPATIENT
Start: 2021-12-21 | End: 2021-12-24 | Stop reason: HOSPADM

## 2021-12-21 RX ORDER — ACETAMINOPHEN 325 MG/1
650 TABLET ORAL ONCE
Status: COMPLETED | OUTPATIENT
Start: 2021-12-21 | End: 2021-12-21

## 2021-12-21 RX ORDER — LISINOPRIL 2.5 MG/1
10 TABLET ORAL DAILY
Status: DISCONTINUED | OUTPATIENT
Start: 2021-12-21 | End: 2021-12-21

## 2021-12-21 RX ADMIN — MONTELUKAST 10 MG: 10 TABLET, FILM COATED ORAL at 09:12

## 2021-12-21 RX ADMIN — FAMOTIDINE 20 MG: 20 TABLET ORAL at 10:12

## 2021-12-21 RX ADMIN — ALBUTEROL SULFATE 2.5 MG: 2.5 SOLUTION RESPIRATORY (INHALATION) at 07:12

## 2021-12-21 RX ADMIN — GABAPENTIN 200 MG: 100 CAPSULE ORAL at 09:12

## 2021-12-21 RX ADMIN — MUPIROCIN: 20 OINTMENT TOPICAL at 09:12

## 2021-12-21 RX ADMIN — ACETAMINOPHEN 650 MG: 325 TABLET ORAL at 10:12

## 2021-12-21 RX ADMIN — HYDROCODONE BITARTRATE AND ACETAMINOPHEN 1 TABLET: 5; 325 TABLET ORAL at 05:12

## 2021-12-21 RX ADMIN — ONDANSETRON 4 MG: 2 INJECTION INTRAMUSCULAR; INTRAVENOUS at 12:12

## 2021-12-21 RX ADMIN — ALBUTEROL SULFATE 2.5 MG: 2.5 SOLUTION RESPIRATORY (INHALATION) at 01:12

## 2021-12-21 RX ADMIN — ACETAMINOPHEN 650 MG: 325 TABLET ORAL at 01:12

## 2021-12-21 RX ADMIN — ATORVASTATIN CALCIUM 10 MG: 10 TABLET, FILM COATED ORAL at 09:12

## 2021-12-21 RX ADMIN — HYDROCODONE BITARTRATE AND ACETAMINOPHEN 1 TABLET: 5; 325 TABLET ORAL at 09:12

## 2021-12-21 RX ADMIN — BISACODYL 5 MG: 5 TABLET, COATED ORAL at 09:12

## 2021-12-21 RX ADMIN — PIPERACILLIN AND TAZOBACTAM 3.38 G: 3; .375 INJECTION, POWDER, LYOPHILIZED, FOR SOLUTION INTRAVENOUS; PARENTERAL at 04:12

## 2021-12-21 RX ADMIN — ENOXAPARIN SODIUM 40 MG: 100 INJECTION SUBCUTANEOUS at 09:12

## 2021-12-21 RX ADMIN — ALBUTEROL SULFATE 2.5 MG: 2.5 SOLUTION RESPIRATORY (INHALATION) at 08:12

## 2021-12-21 NOTE — CARE UPDATE
12/20/21 1952   Patient Assessment/Suction   Level of Consciousness (AVPU) alert   Respiratory Effort Unlabored   Expansion/Accessory Muscles/Retractions expansion symmetric   All Lung Fields Breath Sounds Anterior:;clear;diminished   MECHELLE Breath Sounds clear   LLL Breath Sounds diminished   RUL Breath Sounds clear   RML Breath Sounds diminished   RLL Breath Sounds diminished   Rhythm/Pattern, Respiratory pattern regular;unlabored   Cough Frequency no cough   PRE-TX-O2   O2 Device (Oxygen Therapy) nasal cannula   $ Is the patient on Low Flow Oxygen? Yes   Flow (L/min) 2   Oxygen Concentration (%) 28   SpO2 99 %   Pulse 108   Resp 18   Positioning HOB elevated 30 degrees   Incentive Spirometer   $ Incentive Spirometer Charges done with encouragement   Incentive Spirometer Predicted Level (mL) 1500   Administration (IS) instruction provided, follow-up   Number of Repetitions (IS) 8   Level Incentive Spirometer (mL) 900   Patient Tolerance (IS) fair  (pt still drowsy)   Ready to Wean/Extubation Screen   FIO2<=50 (chart decimal) 0.28

## 2021-12-21 NOTE — ASSESSMENT & PLAN NOTE
Chronic, controlled.  Latest blood pressure and vitals reviewed-   Temp:  [96.9 °F (36.1 °C)-99.8 °F (37.7 °C)]   Pulse:  []   Resp:  [12-20]   BP: (115-150)/(54-69)   SpO2:  [88 %-100 %] .   Home meds for hypertension were reviewed and noted below.   Hypertension Medications             enalapril (VASOTEC) 10 MG tablet TAKE ONE TABLET BY MOUTH ONCE DAILY          While in the hospital, will manage blood pressure as follows; Continue home antihypertensive regimen    Will utilize p.r.n. blood pressure medication only if patient's blood pressure greater than  180/110 and she develops symptoms such as worsening chest pain or shortness of breath.

## 2021-12-21 NOTE — PROGRESS NOTES
[facility] Hospital Medicine  Progress Note    Patient Name: Teressa Benson  MRN: [id number]  Patient Class: IP- Inpatient   Admission Date: 12/20/2021  Length of Stay: 1 days  Attending Physician: Lyssa Long MD  Primary Care Provider: Crow Bustos MD        Subjective:     Principal Problem:Cancer of ascending colon        HPI:  Patient is an 81F with history of COPD, HTN, arthritis who is seen today after laparoscopic R hemicolectomy with ileocolic anastomosis and excision of cyst from abdominal wall ( 2 cm) by Dr. Harrison. Colonoscopy in October showed mass in the ascending colon which was confirmed to be adenocarcinoma. That mass was removed during surgery today. Patient reports her pain is controlled at this time. Denies nausea or vomiting. Reports a sore throat after surgery which I explained can be from the ETT. She denies shortness of breath, le edema, or other complaints. She is accompanied by her 3 daughters.            Overview/Hospital Course:  No notes on file    Interval History: Patient seen and examined. Reports decent pain control. No BM or flatus. No nausea or vomiting. Getting 2 units PRBCs for Hgb 6.7. PT/OT ordered. FLD per surgeon.    Review of Systems   Constitutional: Negative for chills and fever.   HENT: Negative for congestion and rhinorrhea.    Respiratory: Negative for cough, shortness of breath and wheezing.    Cardiovascular: Negative for chest pain, palpitations and leg swelling.   Gastrointestinal: Positive for abdominal pain (mild). Negative for abdominal distention, nausea and vomiting.   Genitourinary: Negative for dysuria and urgency.   Musculoskeletal: Negative for arthralgias and neck stiffness.   Skin: Negative for rash and wound.   Neurological: Negative for dizziness and weakness.     Objective:     Vital Signs (Most Recent):  Temp: 99.7 °F (37.6 °C) (12/21/21 1230)  Pulse: 94 (12/21/21 1230)  Resp: 16 (12/21/21 1230)  BP:  (146/69) (12/21/21  1230)  SpO2: 98 % (12/21/21 1230) Vital Signs (24h Range):  Temp:  [96.9 °F (36.1 °C)-99.8 °F (37.7 °C)] 99.7 °F (37.6 °C)  Pulse:  [] 94  Resp:  [11-20] 16  SpO2:  [88 %-100 %] 98 %  BP: (115-150)/(54-69) 150/66     Weight: 58.1 kg (128 lb)  Body mass index is 25.85 kg/m².    Intake/Output Summary (Last 24 hours) at 12/21/2021 1258  Last data filed at 12/21/2021 0444  Gross per 24 hour   Intake 443.95 ml   Output 800 ml   Net -356.05 ml      Physical Exam  Constitutional:       General: She is not in acute distress.     Appearance: She is well-developed.   HENT:      Head: Normocephalic and atraumatic.   Eyes:      Pupils: Pupils are equal, round, and reactive to light.   Cardiovascular:      Rate and Rhythm: Normal rate and regular rhythm.      Heart sounds: No murmur heard.      Pulmonary:      Effort: Pulmonary effort is normal. No respiratory distress.      Breath sounds: Normal breath sounds. No wheezing or rales.   Abdominal:      General: There is no distension.      Palpations: Abdomen is soft.      Tenderness: There is no abdominal tenderness.      Comments: Bandages c/d/i   Musculoskeletal:         General: Normal range of motion.   Skin:     General: Skin is warm and dry.      Findings: No rash.   Neurological:      Mental Status: She is alert and oriented to person, place, and time.      Cranial Nerves: No cranial nerve deficit.   Psychiatric:         Behavior: Behavior normal.         Significant Labs: All pertinent labs within the past 24 hours have been reviewed.    Significant Imaging: I have reviewed all pertinent imaging results/findings within the past 24 hours.      Assessment/Plan:      * Cancer of ascending colon  S/p Laparoscopic R hemicolectomy with ileocolic anastomosis with Dr. Harrison 12/20  Monitor H/H - transfuse 2 units 12/21  Incentive spirometry  Pain control  Nausea control  Follow surgery recommendations  PT/OT    Mixed hyperlipidemia  Restart statin      Hypertension  Chronic,  controlled.  Latest blood pressure and vitals reviewed-   Temp:  [96.9 °F (36.1 °C)-99.8 °F (37.7 °C)]   Pulse:  []   Resp:  [12-20]   BP: (115-150)/(54-69)   SpO2:  [88 %-100 %] .   Home meds for hypertension were reviewed and noted below.   Hypertension Medications             enalapril (VASOTEC) 10 MG tablet TAKE ONE TABLET BY MOUTH ONCE DAILY          While in the hospital, will manage blood pressure as follows; Continue home antihypertensive regimen    Will utilize p.r.n. blood pressure medication only if patient's blood pressure greater than  180/110 and she develops symptoms such as worsening chest pain or shortness of breath.        COPD (chronic obstructive pulmonary disease)  PRN albuterol for SOB/Wheezing  Restart singulair        VTE Risk Mitigation (From admission, onward)         Ordered     enoxaparin injection 40 mg  Daily         12/20/21 1350     IP VTE HIGH RISK PATIENT  Once         12/20/21 1350     Place sequential compression device  Until discontinued         12/20/21 1350     Place sequential compression device  Until discontinued         12/20/21 1350                Discharge Planning   GOMEZ:      Code Status: Prior   Is the patient medically ready for discharge?:     Reason for patient still in hospital (select all that apply): Patient trending condition and Treatment  Discharge Plan A: Home Health,Home with family                  Lyssa Long MD  Department of Hospital Medicine   [facility]

## 2021-12-21 NOTE — PROGRESS NOTES
POD 1 s/p lap R kendra  Pt resting comfortably in bed.  Denies n/v.    NO flatus or BM.   Pain mostly controlled.    Wt Readings from Last 3 Encounters:   12/20/21 58.1 kg (128 lb)   12/17/21 58.5 kg (129 lb)   12/17/21 58.1 kg (128 lb)     Temp Readings from Last 3 Encounters:   12/21/21 97.9 °F (36.6 °C)   12/14/21 98.6 °F (37 °C) (Oral)   11/30/21 97.1 °F (36.2 °C) (Tympanic)     BP Readings from Last 3 Encounters:   12/21/21 (!) 130/58   12/14/21 137/65   12/10/21 118/78     Pulse Readings from Last 3 Encounters:   12/21/21 82   12/14/21 63   12/10/21 70     AAOx3  CTA B  Sinus  Soft/nd/appt ttp  2+ pulses     Lab Results   Component Value Date    WBC 13.12 (H) 12/21/2021    HGB 6.7 (L) 12/21/2021    HCT 23.5 (L) 12/21/2021    MCV 78 (L) 12/21/2021     12/21/2021       BMP  Lab Results   Component Value Date     12/21/2021    K 4.3 12/21/2021     12/21/2021    CO2 21 (L) 12/21/2021    BUN 7 (L) 12/21/2021    CREATININE 0.6 12/21/2021    CALCIUM 8.0 (L) 12/21/2021    ANIONGAP 12 12/21/2021    ESTGFRAFRICA >60 12/21/2021    EGFRNONAA >60 12/21/2021     A/: s/p lap R kendra  Agree with transfusion of 2upRBC  Adv to full liquid diet  Ambulate

## 2021-12-21 NOTE — CARE UPDATE
12/21/21 0709   Patient Assessment/Suction   Level of Consciousness (AVPU) alert   Respiratory Effort Unlabored   Expansion/Accessory Muscles/Retractions expansion symmetric   All Lung Fields Breath Sounds clear;diminished   MECHELLE Breath Sounds clear   LLL Breath Sounds diminished   RUL Breath Sounds clear   RML Breath Sounds diminished   RLL Breath Sounds diminished   Rhythm/Pattern, Respiratory pattern regular;depth regular   Cough Frequency no cough   PRE-TX-O2   O2 Device (Oxygen Therapy) nasal cannula   $ Is the patient on Low Flow Oxygen? Yes   Flow (L/min) (S)  2  (weaned to 1lpm,)   SpO2 99 %   Pulse Oximetry Type Intermittent   $ Pulse Oximetry - Multiple Charge Pulse Oximetry - Multiple   Pulse 74   Resp 18   Aerosol Therapy   $ Aerosol Therapy Charges Aerosol Treatment   Respiratory Treatment Status (SVN) given   Treatment Route (SVN) mask;oxygen   Patient Position (SVN) HOB elevated   Post Treatment Assessment (SVN) breath sounds unchanged   Signs of Intolerance (SVN) none   Breath Sounds Post-Respiratory Treatment   Throughout All Fields Post-Treatment All Fields   Throughout All Fields Post-Treatment aeration increased;stridor, expiratory   Post-treatment Heart Rate (beats/min) 78   Post-treatment Resp Rate (breaths/min) 18   Incentive Spirometer   $ Incentive Spirometer Charges done with encouragement   Administration (IS) instruction provided, follow-up   Number of Repetitions (IS) 11   Level Incentive Spirometer (mL) 750   Patient Tolerance (IS) fair  (has pain and gas)

## 2021-12-21 NOTE — PT/OT/SLP PROGRESS
Physical Therapy      Patient Name:  Teressa Benson   MRN:  [id number]    Patient not seen today secondary to receiving 2 units PRBC per charge nurse Melodie. Will follow-up 12/22/21.

## 2021-12-21 NOTE — ASSESSMENT & PLAN NOTE
S/p Laparoscopic R hemicolectomy with ileocolic anastomosis with Dr. Harrison 12/20  Monitor H/H - transfuse 2 units 12/21  Incentive spirometry  Pain control  Nausea control  Follow surgery recommendations  PT/OT

## 2021-12-21 NOTE — SUBJECTIVE & OBJECTIVE
Interval History: Patient seen and examined. Reports decent pain control. No BM or flatus. No nausea or vomiting. Getting 2 units PRBCs for Hgb 6.7. PT/OT ordered. FLD per surgeon.    Review of Systems   Constitutional: Negative for chills and fever.   HENT: Negative for congestion and rhinorrhea.    Respiratory: Negative for cough, shortness of breath and wheezing.    Cardiovascular: Negative for chest pain, palpitations and leg swelling.   Gastrointestinal: Positive for abdominal pain (mild). Negative for abdominal distention, nausea and vomiting.   Genitourinary: Negative for dysuria and urgency.   Musculoskeletal: Negative for arthralgias and neck stiffness.   Skin: Negative for rash and wound.   Neurological: Negative for dizziness and weakness.     Objective:     Vital Signs (Most Recent):  Temp: 99.7 °F (37.6 °C) (12/21/21 1230)  Pulse: 94 (12/21/21 1230)  Resp: 16 (12/21/21 1230)  BP:  (146/69) (12/21/21 1230)  SpO2: 98 % (12/21/21 1230) Vital Signs (24h Range):  Temp:  [96.9 °F (36.1 °C)-99.8 °F (37.7 °C)] 99.7 °F (37.6 °C)  Pulse:  [] 94  Resp:  [11-20] 16  SpO2:  [88 %-100 %] 98 %  BP: (115-150)/(54-69) 150/66     Weight: 58.1 kg (128 lb)  Body mass index is 25.85 kg/m².    Intake/Output Summary (Last 24 hours) at 12/21/2021 1258  Last data filed at 12/21/2021 0444  Gross per 24 hour   Intake 443.95 ml   Output 800 ml   Net -356.05 ml      Physical Exam  Constitutional:       General: She is not in acute distress.     Appearance: She is well-developed.   HENT:      Head: Normocephalic and atraumatic.   Eyes:      Pupils: Pupils are equal, round, and reactive to light.   Cardiovascular:      Rate and Rhythm: Normal rate and regular rhythm.      Heart sounds: No murmur heard.      Pulmonary:      Effort: Pulmonary effort is normal. No respiratory distress.      Breath sounds: Normal breath sounds. No wheezing or rales.   Abdominal:      General: There is no distension.      Palpations: Abdomen is  soft.      Tenderness: There is no abdominal tenderness.      Comments: Bandages c/d/i   Musculoskeletal:         General: Normal range of motion.   Skin:     General: Skin is warm and dry.      Findings: No rash.   Neurological:      Mental Status: She is alert and oriented to person, place, and time.      Cranial Nerves: No cranial nerve deficit.   Psychiatric:         Behavior: Behavior normal.         Significant Labs: All pertinent labs within the past 24 hours have been reviewed.    Significant Imaging: I have reviewed all pertinent imaging results/findings within the past 24 hours.

## 2021-12-21 NOTE — PLAN OF CARE
Problem: Adult Inpatient Plan of Care  Goal: Plan of Care Review  Outcome: Ongoing, Progressing  Goal: Patient-Specific Goal (Individualized)  Outcome: Ongoing, Progressing  Goal: Absence of Hospital-Acquired Illness or Injury  Outcome: Ongoing, Progressing  Goal: Optimal Comfort and Wellbeing  Outcome: Ongoing, Progressing  Goal: Readiness for Transition of Care  Outcome: Ongoing, Progressing     Problem: Infection  Goal: Absence of Infection Signs and Symptoms  Outcome: Ongoing, Progressing     Problem: Fall Injury Risk  Goal: Absence of Fall and Fall-Related Injury  Outcome: Ongoing, Progressing

## 2021-12-21 NOTE — PT/OT/SLP PROGRESS
Occupational Therapy      Patient Name:  Teressa Benson   MRN:  [id number]    Patient not seen today secondary to Other (Comment) (Nurse Elsa stated pt getting blood. Will follow up).    12/21/2021

## 2021-12-22 LAB
ANION GAP SERPL CALC-SCNC: 9 MMOL/L (ref 8–16)
BASOPHILS # BLD AUTO: 0.02 K/UL (ref 0–0.2)
BASOPHILS NFR BLD: 0.2 % (ref 0–1.9)
BUN SERPL-MCNC: 6 MG/DL (ref 8–23)
CALCIUM SERPL-MCNC: 8.2 MG/DL (ref 8.7–10.5)
CHLORIDE SERPL-SCNC: 102 MMOL/L (ref 95–110)
CO2 SERPL-SCNC: 24 MMOL/L (ref 23–29)
CREAT SERPL-MCNC: 0.6 MG/DL (ref 0.5–1.4)
DIFFERENTIAL METHOD: ABNORMAL
EOSINOPHIL # BLD AUTO: 0.1 K/UL (ref 0–0.5)
EOSINOPHIL NFR BLD: 0.5 % (ref 0–8)
ERYTHROCYTE [DISTWIDTH] IN BLOOD BY AUTOMATED COUNT: 17.1 % (ref 11.5–14.5)
EST. GFR  (AFRICAN AMERICAN): >60 ML/MIN/1.73 M^2
EST. GFR  (NON AFRICAN AMERICAN): >60 ML/MIN/1.73 M^2
GLUCOSE SERPL-MCNC: 119 MG/DL (ref 70–110)
HCT VFR BLD AUTO: 29.2 % (ref 37–48.5)
HGB BLD-MCNC: 9.1 G/DL (ref 12–16)
IMM GRANULOCYTES # BLD AUTO: 0.05 K/UL (ref 0–0.04)
IMM GRANULOCYTES NFR BLD AUTO: 0.5 % (ref 0–0.5)
LYMPHOCYTES # BLD AUTO: 1.4 K/UL (ref 1–4.8)
LYMPHOCYTES NFR BLD: 13.6 % (ref 18–48)
MCH RBC QN AUTO: 25 PG (ref 27–31)
MCHC RBC AUTO-ENTMCNC: 31.2 G/DL (ref 32–36)
MCV RBC AUTO: 80 FL (ref 82–98)
MONOCYTES # BLD AUTO: 0.8 K/UL (ref 0.3–1)
MONOCYTES NFR BLD: 8 % (ref 4–15)
NEUTROPHILS # BLD AUTO: 7.9 K/UL (ref 1.8–7.7)
NEUTROPHILS NFR BLD: 77.2 % (ref 38–73)
NRBC BLD-RTO: 0 /100 WBC
PLATELET # BLD AUTO: 253 K/UL (ref 150–450)
PMV BLD AUTO: 8.9 FL (ref 9.2–12.9)
POTASSIUM SERPL-SCNC: 4.2 MMOL/L (ref 3.5–5.1)
RBC # BLD AUTO: 3.64 M/UL (ref 4–5.4)
SODIUM SERPL-SCNC: 135 MMOL/L (ref 136–145)
WBC # BLD AUTO: 10.24 K/UL (ref 3.9–12.7)

## 2021-12-22 PROCEDURE — 94799 UNLISTED PULMONARY SVC/PX: CPT

## 2021-12-22 PROCEDURE — 97165 OT EVAL LOW COMPLEX 30 MIN: CPT

## 2021-12-22 PROCEDURE — 97530 THERAPEUTIC ACTIVITIES: CPT

## 2021-12-22 PROCEDURE — 99900035 HC TECH TIME PER 15 MIN (STAT)

## 2021-12-22 PROCEDURE — 36415 COLL VENOUS BLD VENIPUNCTURE: CPT | Performed by: SURGERY

## 2021-12-22 PROCEDURE — 97162 PT EVAL MOD COMPLEX 30 MIN: CPT

## 2021-12-22 PROCEDURE — 25000003 PHARM REV CODE 250: Performed by: SURGERY

## 2021-12-22 PROCEDURE — 94640 AIRWAY INHALATION TREATMENT: CPT

## 2021-12-22 PROCEDURE — 85025 COMPLETE CBC W/AUTO DIFF WBC: CPT | Performed by: SURGERY

## 2021-12-22 PROCEDURE — 63600175 PHARM REV CODE 636 W HCPCS: Performed by: SURGERY

## 2021-12-22 PROCEDURE — 12000002 HC ACUTE/MED SURGE SEMI-PRIVATE ROOM

## 2021-12-22 PROCEDURE — 25000003 PHARM REV CODE 250: Performed by: NURSE PRACTITIONER

## 2021-12-22 PROCEDURE — 27000221 HC OXYGEN, UP TO 24 HOURS

## 2021-12-22 PROCEDURE — 94761 N-INVAS EAR/PLS OXIMETRY MLT: CPT

## 2021-12-22 PROCEDURE — 97116 GAIT TRAINING THERAPY: CPT

## 2021-12-22 PROCEDURE — 25000003 PHARM REV CODE 250: Performed by: HOSPITALIST

## 2021-12-22 PROCEDURE — 25000242 PHARM REV CODE 250 ALT 637 W/ HCPCS: Performed by: HOSPITALIST

## 2021-12-22 PROCEDURE — 80048 BASIC METABOLIC PNL TOTAL CA: CPT | Performed by: SURGERY

## 2021-12-22 RX ORDER — FAMOTIDINE 20 MG/1
20 TABLET, FILM COATED ORAL 2 TIMES DAILY
Status: DISCONTINUED | OUTPATIENT
Start: 2021-12-22 | End: 2021-12-23

## 2021-12-22 RX ORDER — METOCLOPRAMIDE HYDROCHLORIDE 5 MG/ML
10 INJECTION INTRAMUSCULAR; INTRAVENOUS EVERY 6 HOURS
Status: DISCONTINUED | OUTPATIENT
Start: 2021-12-22 | End: 2021-12-24 | Stop reason: HOSPADM

## 2021-12-22 RX ADMIN — ENALAPRIL MALEATE 10 MG: 10 TABLET ORAL at 08:12

## 2021-12-22 RX ADMIN — ALBUTEROL SULFATE 2.5 MG: 2.5 SOLUTION RESPIRATORY (INHALATION) at 07:12

## 2021-12-22 RX ADMIN — MUPIROCIN: 20 OINTMENT TOPICAL at 09:12

## 2021-12-22 RX ADMIN — ENOXAPARIN SODIUM 40 MG: 100 INJECTION SUBCUTANEOUS at 04:12

## 2021-12-22 RX ADMIN — ALBUTEROL SULFATE 2.5 MG: 2.5 SOLUTION RESPIRATORY (INHALATION) at 02:12

## 2021-12-22 RX ADMIN — METOCLOPRAMIDE 10 MG: 5 INJECTION, SOLUTION INTRAMUSCULAR; INTRAVENOUS at 04:12

## 2021-12-22 RX ADMIN — GABAPENTIN 200 MG: 100 CAPSULE ORAL at 08:12

## 2021-12-22 RX ADMIN — MUPIROCIN: 20 OINTMENT TOPICAL at 08:12

## 2021-12-22 RX ADMIN — FAMOTIDINE 20 MG: 20 TABLET ORAL at 07:12

## 2021-12-22 NOTE — PROGRESS NOTES
[facility] Hospital Medicine  Progress Note    Patient Name: Teressa Benson  MRN: [id number]  Patient Class: IP- Inpatient   Admission Date: 12/20/2021  Length of Stay: 2 days  Attending Physician: Lyssa Long MD  Primary Care Provider: Crow Bustos MD        Subjective:     Principal Problem:Cancer of ascending colon        HPI:  Patient is an 81F with history of COPD, HTN, arthritis who is seen today after laparoscopic R hemicolectomy with ileocolic anastomosis and excision of cyst from abdominal wall ( 2 cm) by Dr. Harrison. Colonoscopy in October showed mass in the ascending colon which was confirmed to be adenocarcinoma. That mass was removed during surgery today. Patient reports her pain is controlled at this time. Denies nausea or vomiting. Reports a sore throat after surgery which I explained can be from the ETT. She denies shortness of breath, le edema, or other complaints. She is accompanied by her 3 daughters.            Overview/Hospital Course:  No notes on file    Interval History: Patient seen and examined. Just worked with PT, walked around nursing station x 1. Reports decent pain control. Reports multiple BMs. No nausea or vomiting. Received 2 units PRBCs yesterday. Fever of 101.1 last night noted. Patient was unaware of having a fever. Denies any symptoms at this time other than abdominal pain. Will monitor temperature and pursue infectious workup only if she has another temperature of 100.4 or greater. Discussed with nurse.   Review of Systems   Constitutional: Negative for chills and fatigue.   HENT: Negative for congestion and rhinorrhea.    Respiratory: Negative for cough, shortness of breath and wheezing.    Cardiovascular: Negative for chest pain, palpitations and leg swelling.   Gastrointestinal: Positive for abdominal pain (mild). Negative for abdominal distention, nausea and vomiting.   Genitourinary: Negative for dysuria and urgency.   Musculoskeletal:  Negative for arthralgias and neck stiffness.   Skin: Negative for rash and wound.   Neurological: Negative for dizziness and weakness.     Objective:     Vital Signs (Most Recent):  Temp: 98.6 °F (37 °C) (12/22/21 1123)  Pulse: 76 (12/22/21 1123)  Resp: 18 (12/22/21 1123)  BP: (!) 146/65 (12/22/21 1123)  SpO2: 98 % (12/22/21 1123) Vital Signs (24h Range):  Temp:  [98.4 °F (36.9 °C)-101.1 °F (38.4 °C)] 98.6 °F (37 °C)  Pulse:  [64-94] 76  Resp:  [16-20] 18  SpO2:  [94 %-99 %] 98 %  BP: (129-157)/(59-70) 146/65     Weight: 58.1 kg (128 lb)  Body mass index is 25.85 kg/m².    Intake/Output Summary (Last 24 hours) at 12/22/2021 1202  Last data filed at 12/21/2021 1825  Gross per 24 hour   Intake 516.66 ml   Output --   Net 516.66 ml      Physical Exam  Constitutional:       General: She is not in acute distress.     Appearance: She is well-developed.   HENT:      Head: Normocephalic and atraumatic.   Eyes:      Pupils: Pupils are equal, round, and reactive to light.   Cardiovascular:      Rate and Rhythm: Normal rate and regular rhythm.      Heart sounds: No murmur heard.      Pulmonary:      Effort: Pulmonary effort is normal. No respiratory distress.      Breath sounds: Normal breath sounds. No wheezing or rales.   Abdominal:      General: There is no distension.      Palpations: Abdomen is soft.      Tenderness: There is no abdominal tenderness.      Comments: Bandages c/d/i   Musculoskeletal:         General: Normal range of motion.   Skin:     General: Skin is warm and dry.      Findings: No rash.   Neurological:      Mental Status: She is alert and oriented to person, place, and time.      Cranial Nerves: No cranial nerve deficit.   Psychiatric:         Behavior: Behavior normal.         Significant Labs: All pertinent labs within the past 24 hours have been reviewed.    Significant Imaging: I have reviewed all pertinent imaging results/findings within the past 24 hours.      Assessment/Plan:      * Cancer of  ascending colon  S/p Laparoscopic R hemicolectomy with ileocolic anastomosis with Dr. Harrison 12/20  Monitor H/H - transfused 2 units 12/21  Incentive spirometry  Pain control  Nausea control  Follow surgery recommendations  PT/OT    Mixed hyperlipidemia  Statin      Hypertension  Chronic, controlled.  Latest blood pressure and vitals reviewed-   Temp:  [98.4 °F (36.9 °C)-101.1 °F (38.4 °C)]   Pulse:  [64-94]   Resp:  [16-20]   BP: (129-157)/(59-70)   SpO2:  [94 %-99 %] .   Home meds for hypertension were reviewed and noted below.   Hypertension Medications             enalapril (VASOTEC) 10 MG tablet TAKE ONE TABLET BY MOUTH ONCE DAILY          While in the hospital, will manage blood pressure as follows; Continue home antihypertensive regimen    Will utilize p.r.n. blood pressure medication only if patient's blood pressure greater than  180/110 and she develops symptoms such as worsening chest pain or shortness of breath.        COPD (chronic obstructive pulmonary disease)  PRN albuterol for SOB/Wheezing  Singulair        VTE Risk Mitigation (From admission, onward)         Ordered     enoxaparin injection 40 mg  Daily         12/20/21 1350     IP VTE HIGH RISK PATIENT  Once         12/20/21 1350     Place sequential compression device  Until discontinued         12/20/21 1350     Place sequential compression device  Until discontinued         12/20/21 1350                Discharge Planning   GOMEZ: 12/26/2021     Code Status: Prior   Is the patient medically ready for discharge?:     Reason for patient still in hospital (select all that apply): Patient trending condition and Treatment  Discharge Plan A: Home Health,Home with family                  Lyssa Long MD  Department of Hospital Medicine   [facility]

## 2021-12-22 NOTE — PLAN OF CARE
Goals to be met by: 1/19/21    Patient will increase functional independence with ADLs by performing:    UE Dressing with Modified [place].  LE Dressing with Modified [place].  Grooming while standing at sink with Modified [place].  Toileting from toilet with Modified [place] for hygiene and clothing management.   Bathing from  shower chair/bench with Modified [place].  Toilet transfer to toilet with Modified [place].  Increased strength and functional activity tolerance for ADL's/IADL's as evidenced by requiring less assistance with these tasks.

## 2021-12-22 NOTE — PT/OT/SLP EVAL
Occupational Therapy   Evaluation    Name: Teressa Benson  MRN: [id number]  Admitting Diagnosis:  Cancer of ascending colon  Recent Surgery: Procedure(s) (LRB):  COLECTOMY, LAPAROSCOPIC  ileocolic anastamosis (Right) 2 Days Post-Op    Recommendations:     Discharge Recommendations: home health OT  Discharge Equipment Recommendations:     Barriers to discharge:       Assessment:     Teressa Benson is a 81 y.o. female with a medical diagnosis of Cancer of ascending colon.  She presents with the following performance deficits affecting function: weakness,impaired endurance,impaired self care skills,impaired functional mobilty,impaired balance,pain,impaired cardiopulmonary response to activity.  Pt was agreeable to OT. Pt demonstrates BUE AROM/strength WFL's. OT provided instruction in home safety with ADL's/IADL's including review of home set up and DME/AE. Pt verbalized understanding. OT provided education in available AE for lower body dressing and bathing secondary to abdominal pain. Pt was not interested at this time and stated family would assist. Recommend HHOT at discharge.    Rehab Prognosis: Good; patient would benefit from acute skilled OT services to address these deficits and reach maximum level of function.       Plan:     Patient to be seen 4 x/week to address the above listed problems via self-care/home management,therapeutic activities,therapeutic exercises  · Plan of Care Expires: 01/19/22  · Plan of Care Reviewed with: patient    Subjective     Chief Complaint: Pain  Patient/Family Comments/goals: To get better and go home    Occupational Profile:  Living Environment: Pt lives with adult daughters(rotating between them). Pt has walk in shower with shower chair and grab bars. Pt has standard toilet.  Previous level of function: Independent  Equipment Used at Home:  grab bar,shower chair  Assistance upon Discharge: Family    Pain/Comfort:  · Pain Rating 1: 2/10  · Location 1: abdomen  · Pain Rating  Post-Intervention 1: 2/10    Patients cultural, spiritual, Druze conflicts given the current situation:      Objective:     Communicated with: Nurse Hunter prior to session.  Patient found up in chair with chair check,oxygen,peripheral IV upon OT entry to room.    General Precautions: Standard, fall,respiratory   Orthopedic Precautions:N/A   Braces: N/A  Respiratory Status: Nasal cannula, flow 1 L/min    Occupational Performance    Activities of Daily Living:  · Feeding:  independence    · Grooming: stand by assistance set up  · Bathing: moderate assistance    · Upper Body Dressing: minimum assistance    · Lower Body Dressing: moderate assistance    · Toileting: minimum assistance      Cognitive/Visual Perceptual:  Pt alert and oriented    Physical Exam:  Upper Extremity Strength:    -       Right Upper Extremity: WFL  -       Left Upper Extremity: WFL    AMPAC 6 Click ADL:  AMPAC Total Score: 19    Treatment & Education:  OT provided education in role of OT. Pt verbalized understanding and was agreeable to OT.  OT provided instruction in home safety with ADL's/IADL's including review of home set up and DME/AE. Pt verbalized understanding.  Education:    Patient left up in chair with all lines intact, call button in reach, chair alarm on and Charge Nurse Melodie notified notified    GOALS:   Multidisciplinary Problems     Occupational Therapy Goals        Problem: Occupational Therapy Goal    Goal Priority Disciplines Outcome Interventions   Occupational Therapy Goal     OT, PT/OT     Description: Goals to be met by: 1/19/21    Patient will increase functional independence with ADLs by performing:    UE Dressing with Modified [place].  LE Dressing with Modified [place].  Grooming while standing at sink with Modified [place].  Toileting from toilet with Modified [place] for hygiene and clothing management.   Bathing from  shower chair/bench with Modified [place].  Toilet transfer to toilet  with Modified [place].  Increased strength and functional activity tolerance for ADL's/IADL's as evidenced by requiring less assistance with these tasks.                     History:     Past Medical History:   Diagnosis Date    Abnormal gastrointestinal PET scan 11/4/2021    Allergy     Arthritis     Asthma     Cancer of ascending colon 11/4/2021    Cataract     COPD (chronic obstructive pulmonary disease)     Diverticulitis     DJD (degenerative joint disease)     Full dentures     [place] (hard of hearing)     BILAT AIDS    Hypertension     IGT (impaired glucose tolerance)     Iron deficiency anemia due to chronic blood loss 11/4/2021    Liver nodule 11/4/2021    Other and unspecified hyperlipidemia     Rectal cancer 11/4/2021    Squamous cell carcinoma of skin 2020    left inner thigh    Urinary tract infection        Past Surgical History:   Procedure Laterality Date    APPENDECTOMY      CHOLECYSTECTOMY      COLONOSCOPY N/A 11/30/2021    Procedure: COLONOSCOPY WITH ENDOSCOPIC MUCOSAL RESECTION;  Surgeon: Raul Morales III, MD;  Location: [facility];  Service: Endoscopy;  Laterality: N/A;    COLONOSCOPY W/ POLYPECTOMY  11/30/2021    ENDOSCOPIC ULTRASOUND OF UPPER GASTROINTESTINAL TRACT  11/30/2021    ENDOSCOPIC ULTRASOUND OF UPPER GASTROINTESTINAL TRACT N/A 11/30/2021    Procedure: ULTRASOUND, UPPER GI TRACT, ENDOSCOPIC;  Surgeon: Raul Morales III, MD;  Location: [facility];  Service: Endoscopy;  Laterality: N/A;    EYE SURGERY      HYSTERECTOMY      TONSILLECTOMY, ADENOIDECTOMY         Time Tracking:     OT Date of Treatment: 12/22/21  OT Start Time: 1017  OT Stop Time: 1027  OT Total Time (min): 10 min    Billable Minutes:Evaluation 10    12/22/2021

## 2021-12-22 NOTE — CARE UPDATE
12/22/21 0754   Patient Assessment/Suction   Level of Consciousness (AVPU) alert   Respiratory Effort Normal   Expansion/Accessory Muscles/Retractions expansion symmetric   All Lung Fields Breath Sounds Anterior:   LLL Breath Sounds diminished   RLL Breath Sounds diminished   Rhythm/Pattern, Respiratory pattern regular   Cough Frequency no cough   PRE-TX-O2   O2 Device (Oxygen Therapy) nasal cannula   $ Is the patient on Low Flow Oxygen? Yes   Flow (L/min) 2   Oxygen Concentration (%) 28   SpO2 (!) 94 %   Pulse Oximetry Type Intermittent   $ Pulse Oximetry - Multiple Charge Pulse Oximetry - Multiple   Pulse 79   Resp 18   Positioning HOB elevated 45 degrees   Inhaler   $ Inhaler Charges PRN treatment not required   Incentive Spirometer   $ Incentive Spirometer Charges done with encouragement   Incentive Spirometer Predicted Level (mL) 760   Administration (IS) proper technique demonstrated   Number of Repetitions (IS) 10   Level Incentive Spirometer (mL) 500   Patient Tolerance (IS) good   POx, MDI prn, IS QS

## 2021-12-22 NOTE — PLAN OF CARE
Problem: Physical Therapy Goal  Goal: Physical Therapy Goal  Description: Goals to be met by: 2021     Patient will increase functional independence with mobility by performin. Supine to sit with Contact Guard Assistance  2. Sit to stand transfer with Contact Guard Assistance  3. Bed to chair transfer with Contact Guard Assistance using Rolling Walker  4. Gait  x 250x2 feet with Contact Guard Assistance using Rolling Walker.   5. Lower extremity exercise program x20 reps   Outcome: Ongoing, Progressing   PT eval and treat completed. Gait with VRW 250ft with min assist. OOB chair

## 2021-12-22 NOTE — ASSESSMENT & PLAN NOTE
S/p Laparoscopic R hemicolectomy with ileocolic anastomosis with Dr. Harrison 12/20  Monitor H/H - transfused 2 units 12/21  Incentive spirometry  Pain control  Nausea control  Follow surgery recommendations  PT/OT

## 2021-12-22 NOTE — SUBJECTIVE & OBJECTIVE
Interval History: Patient seen and examined. Just worked with PT, walked around nursing station x 1. Reports decent pain control. Reports multiple BMs. No nausea or vomiting. Received 2 units PRBCs yesterday. Fever of 101.1 last night noted. Patient was unaware of having a fever. Denies any symptoms at this time other than abdominal pain. Will monitor temperature and pursue infectious workup only if she has another temperature of 100.4 or greater. Discussed with nurse.   Review of Systems   Constitutional: Negative for chills and fatigue.   HENT: Negative for congestion and rhinorrhea.    Respiratory: Negative for cough, shortness of breath and wheezing.    Cardiovascular: Negative for chest pain, palpitations and leg swelling.   Gastrointestinal: Positive for abdominal pain (mild). Negative for abdominal distention, nausea and vomiting.   Genitourinary: Negative for dysuria and urgency.   Musculoskeletal: Negative for arthralgias and neck stiffness.   Skin: Negative for rash and wound.   Neurological: Negative for dizziness and weakness.     Objective:     Vital Signs (Most Recent):  Temp: 98.6 °F (37 °C) (12/22/21 1123)  Pulse: 76 (12/22/21 1123)  Resp: 18 (12/22/21 1123)  BP: (!) 146/65 (12/22/21 1123)  SpO2: 98 % (12/22/21 1123) Vital Signs (24h Range):  Temp:  [98.4 °F (36.9 °C)-101.1 °F (38.4 °C)] 98.6 °F (37 °C)  Pulse:  [64-94] 76  Resp:  [16-20] 18  SpO2:  [94 %-99 %] 98 %  BP: (129-157)/(59-70) 146/65     Weight: 58.1 kg (128 lb)  Body mass index is 25.85 kg/m².    Intake/Output Summary (Last 24 hours) at 12/22/2021 1202  Last data filed at 12/21/2021 1825  Gross per 24 hour   Intake 516.66 ml   Output --   Net 516.66 ml      Physical Exam  Constitutional:       General: She is not in acute distress.     Appearance: She is well-developed.   HENT:      Head: Normocephalic and atraumatic.   Eyes:      Pupils: Pupils are equal, round, and reactive to light.   Cardiovascular:      Rate and Rhythm: Normal rate  and regular rhythm.      Heart sounds: No murmur heard.      Pulmonary:      Effort: Pulmonary effort is normal. No respiratory distress.      Breath sounds: Normal breath sounds. No wheezing or rales.   Abdominal:      General: There is no distension.      Palpations: Abdomen is soft.      Tenderness: There is no abdominal tenderness.      Comments: Bandages c/d/i   Musculoskeletal:         General: Normal range of motion.   Skin:     General: Skin is warm and dry.      Findings: No rash.   Neurological:      Mental Status: She is alert and oriented to person, place, and time.      Cranial Nerves: No cranial nerve deficit.   Psychiatric:         Behavior: Behavior normal.         Significant Labs: All pertinent labs within the past 24 hours have been reviewed.    Significant Imaging: I have reviewed all pertinent imaging results/findings within the past 24 hours.

## 2021-12-22 NOTE — ASSESSMENT & PLAN NOTE
Chronic, controlled.  Latest blood pressure and vitals reviewed-   Temp:  [98.4 °F (36.9 °C)-101.1 °F (38.4 °C)]   Pulse:  [64-94]   Resp:  [16-20]   BP: (129-157)/(59-70)   SpO2:  [94 %-99 %] .   Home meds for hypertension were reviewed and noted below.   Hypertension Medications             enalapril (VASOTEC) 10 MG tablet TAKE ONE TABLET BY MOUTH ONCE DAILY          While in the hospital, will manage blood pressure as follows; Continue home antihypertensive regimen    Will utilize p.r.n. blood pressure medication only if patient's blood pressure greater than  180/110 and she develops symptoms such as worsening chest pain or shortness of breath.

## 2021-12-22 NOTE — PLAN OF CARE
Patient is alert & oriented x4.  She is up with assistance.  She did get up in a chair tonight.  She brought in her home med for blood pressure and it was sent to pharmacy for labeling.  I messaged the NP to have her tylenol restarted and to get her something for indigestion.  Pepcid was ordered.  Her vital signs have been stable.  Her daughter stayed with her.  All safety precautions are in place. Report will be given to oncoming nurse.  I will continue to monitor patient.

## 2021-12-22 NOTE — PROGRESS NOTES
POD 2 s/p lap R kendra  Sitting up in chair.   Has ambulated today.  Feels well.  Having some heartburn.  Denies nausea.  Reports two loose BM with flatus.  Fever overnight noted.       Wt Readings from Last 3 Encounters:   12/21/21 58.1 kg (128 lb)   12/17/21 58.5 kg (129 lb)   12/17/21 58.1 kg (128 lb)     Temp Readings from Last 3 Encounters:   12/22/21 98.6 °F (37 °C)   12/14/21 98.6 °F (37 °C) (Oral)   11/30/21 97.1 °F (36.2 °C) (Tympanic)     BP Readings from Last 3 Encounters:   12/22/21 (!) 146/65   12/14/21 137/65   12/10/21 118/78     Pulse Readings from Last 3 Encounters:   12/22/21 76   12/14/21 63   12/10/21 70     AAox3  Sinus  Soft/nd/nt  BS present  2+ pulses   Inc: c/d/i    Lab Results   Component Value Date    WBC 10.24 12/22/2021    HGB 9.1 (L) 12/22/2021    HCT 29.2 (L) 12/22/2021    MCV 80 (L) 12/22/2021     12/22/2021       BMP  Lab Results   Component Value Date     (L) 12/22/2021    K 4.2 12/22/2021     12/22/2021    CO2 24 12/22/2021    BUN 6 (L) 12/22/2021    CREATININE 0.6 12/22/2021    CALCIUM 8.2 (L) 12/22/2021    ANIONGAP 9 12/22/2021    ESTGFRAFRICA >60 12/22/2021    EGFRNONAA >60 12/22/2021     A/P: s/p Lap r kendra  Given heartburn will check kub to ensure no gastric dilation  If xray negative will adv diet to regular  Cont ambulation.   crp tomorrow.  If labs, xray and clinical picture remain stable anticipate possible d/c tomorrow

## 2021-12-22 NOTE — PROGRESS NOTES
Xray reviewed.  Findings suggestive of ileus consistent with delayed gastric function  Will start reglan.  Keep npo  Pt and nursing called with recommendations to place NG if nausea develops.

## 2021-12-22 NOTE — PT/OT/SLP EVAL
"Physical Therapy Evaluation    Patient Name:  Teressa Benson   MRN:  [id number]    Recommendations:     Discharge Recommendations:  home health PT   Discharge Equipment Recommendations: walker, rolling   Barriers to discharge: None    Assessment:     Teressa Benson is a 81 y.o. female admitted with a medical diagnosis of Cancer of ascending colon.  She presents with the following impairments/functional limitations:  weakness,impaired endurance,impaired self care skills,impaired functional mobilty,impaired balance,pain,impaired cardiopulmonary response to activity . Pt alert and agreeable to PT- pt wanting to use bathroom- voided with min assist with set up assist for hygiene. Pt then ambulated to hallways with RW 1 liter O2 250ft with another person following with chair- pt took 1 seated rest. C/o weakness and fatigue.  Pt to benefit from continued therapies.    Rehab Prognosis: Fair; patient would benefit from acute skilled PT services to address these deficits and reach maximum level of function.    Recent Surgery: Procedure(s) (LRB):  COLECTOMY, LAPAROSCOPIC  ileocolic anastamosis (Right) 2 Days Post-Op    Plan:     During this hospitalization, patient to be seen 6 x/week to address the identified rehab impairments via gait training,therapeutic activities,therapeutic exercises and progress toward the following goals:    · Plan of Care Expires:  12/23/21    Subjective   Pt stated of weakness  Chief Complaint: stated" I don't do well using spirometer"  Patient/Family Comments/goals: get well  Pain/Comfort:  · Pain Rating 1:  (not rated)  · Location 1: abdomen  · Pain Addressed 1: Reposition,Distraction    Patients cultural, spiritual, Jew conflicts given the current situation:      Living Environment:  Home with daughters  Prior to admission, patients level of function was ambulatory/drives.  Equipment used at home:  .  DME owned (not currently used): none.  Upon discharge, patient will have assistance " from family.    Objective:     Communicated with nurse Fuentes to session.  Patient found HOB elevated with oxygen  upon PT entry to room.    General Precautions: Standard, fall,respiratory   Orthopedic Precautions:N/A   Braces: N/A  Respiratory Status: Nasal cannula, flow 1 L/min    Exams:  · Postural Exam:  Patient presented with the following abnormalities:    · -       Rounded shoulders  · -       Forward head  · -       BMI 25  · RLE ROM: WFL  · RLE Strength: WFL  · LLE ROM: WFL  · LLE Strength: WFL    Functional Mobility:  · Bed Mobility:     · Rolling Right: minimum assistance  · Scooting: minimum assistance  · Supine to Sit: minimum assistance  · Transfers:     · Sit to Stand:  minimum assistance with rolling walker  · Bed to Chair: minimum assistance with  rolling walker  using  Stand Pivot  · Toilet Transfer: minimum assistance with  rolling walker  using  Stand Pivot  · Gait: 250ft with RW min assist with O2 at 1 liter. pt took 1 seated rest    Therapeutic Activities and Exercises:   Patient was educated on the importance of OOB activity and functional mobility to negate negative effects of prolonged bed rest during hospitalization, safe transfers and ambulation, and D/C planning   Bathroom use to void  OOB chair post PT    AM-PAC 6 CLICK MOBILITY  Total Score:16     Patient left OOB chair with all lines intact, call button in reach, chair alarm on and nurse Elsa notified.    GOALS:   Multidisciplinary Problems     Physical Therapy Goals        Problem: Physical Therapy Goal    Goal Priority Disciplines Outcome Goal Variances Interventions   Physical Therapy Goal     PT, PT/OT Ongoing, Progressing     Description: Goals to be met by: 2021     Patient will increase functional independence with mobility by performin. Supine to sit with Contact Guard Assistance  2. Sit to stand transfer with Contact Guard Assistance  3. Bed to chair transfer with Contact Guard Assistance using Rolling Walker  4.  Gait  x 250x2 feet with Contact Guard Assistance using Rolling Walker.   5. Lower extremity exercise program x20 reps                    History:     Past Medical History:   Diagnosis Date    Abnormal gastrointestinal PET scan 11/4/2021    Allergy     Arthritis     Asthma     Cancer of ascending colon 11/4/2021    Cataract     COPD (chronic obstructive pulmonary disease)     Diverticulitis     DJD (degenerative joint disease)     Full dentures     [place] (hard of hearing)     BILAT AIDS    Hypertension     IGT (impaired glucose tolerance)     Iron deficiency anemia due to chronic blood loss 11/4/2021    Liver nodule 11/4/2021    Other and unspecified hyperlipidemia     Rectal cancer 11/4/2021    Squamous cell carcinoma of skin 2020    left inner thigh    Urinary tract infection        Past Surgical History:   Procedure Laterality Date    APPENDECTOMY      CHOLECYSTECTOMY      COLONOSCOPY N/A 11/30/2021    Procedure: COLONOSCOPY WITH ENDOSCOPIC MUCOSAL RESECTION;  Surgeon: Raul Morales III, MD;  Location: [facility];  Service: Endoscopy;  Laterality: N/A;    COLONOSCOPY W/ POLYPECTOMY  11/30/2021    ENDOSCOPIC ULTRASOUND OF UPPER GASTROINTESTINAL TRACT  11/30/2021    ENDOSCOPIC ULTRASOUND OF UPPER GASTROINTESTINAL TRACT N/A 11/30/2021    Procedure: ULTRASOUND, UPPER GI TRACT, ENDOSCOPIC;  Surgeon: Raul Morales III, MD;  Location: [facility];  Service: Endoscopy;  Laterality: N/A;    EYE SURGERY      HYSTERECTOMY      TONSILLECTOMY, ADENOIDECTOMY         Time Tracking:     PT Received On: 12/22/21  PT Start Time: 0907     PT Stop Time: 0946  PT Total Time (min): 39 min     Billable Minutes: Evaluation 10, Gait Training 20 and Therapeutic Activity 9      12/22/2021

## 2021-12-23 PROBLEM — R50.9 FEVER: Status: ACTIVE | Noted: 2021-12-23

## 2021-12-23 LAB
ANION GAP SERPL CALC-SCNC: 11 MMOL/L (ref 8–16)
BASOPHILS # BLD AUTO: 0.03 K/UL (ref 0–0.2)
BASOPHILS NFR BLD: 0.4 % (ref 0–1.9)
BUN SERPL-MCNC: 11 MG/DL (ref 8–23)
CALCIUM SERPL-MCNC: 8.4 MG/DL (ref 8.7–10.5)
CHLORIDE SERPL-SCNC: 101 MMOL/L (ref 95–110)
CO2 SERPL-SCNC: 25 MMOL/L (ref 23–29)
CREAT SERPL-MCNC: 0.6 MG/DL (ref 0.5–1.4)
CRP SERPL-MCNC: 120.4 MG/L (ref 0–8.2)
DIFFERENTIAL METHOD: ABNORMAL
EOSINOPHIL # BLD AUTO: 0 K/UL (ref 0–0.5)
EOSINOPHIL NFR BLD: 0.3 % (ref 0–8)
ERYTHROCYTE [DISTWIDTH] IN BLOOD BY AUTOMATED COUNT: 17.5 % (ref 11.5–14.5)
EST. GFR  (AFRICAN AMERICAN): >60 ML/MIN/1.73 M^2
EST. GFR  (NON AFRICAN AMERICAN): >60 ML/MIN/1.73 M^2
GLUCOSE SERPL-MCNC: 126 MG/DL (ref 70–110)
HCT VFR BLD AUTO: 30.4 % (ref 37–48.5)
HGB BLD-MCNC: 9.2 G/DL (ref 12–16)
IMM GRANULOCYTES # BLD AUTO: 0.03 K/UL (ref 0–0.04)
IMM GRANULOCYTES NFR BLD AUTO: 0.4 % (ref 0–0.5)
LACTATE SERPL-SCNC: 1.3 MMOL/L (ref 0.5–2.2)
LYMPHOCYTES # BLD AUTO: 1 K/UL (ref 1–4.8)
LYMPHOCYTES NFR BLD: 14.5 % (ref 18–48)
MCH RBC QN AUTO: 24.4 PG (ref 27–31)
MCHC RBC AUTO-ENTMCNC: 30.3 G/DL (ref 32–36)
MCV RBC AUTO: 81 FL (ref 82–98)
MONOCYTES # BLD AUTO: 1.1 K/UL (ref 0.3–1)
MONOCYTES NFR BLD: 14.6 % (ref 4–15)
NEUTROPHILS # BLD AUTO: 5 K/UL (ref 1.8–7.7)
NEUTROPHILS NFR BLD: 69.8 % (ref 38–73)
NRBC BLD-RTO: 0 /100 WBC
PLATELET # BLD AUTO: 287 K/UL (ref 150–450)
PMV BLD AUTO: 9 FL (ref 9.2–12.9)
POTASSIUM SERPL-SCNC: 4.1 MMOL/L (ref 3.5–5.1)
RBC # BLD AUTO: 3.77 M/UL (ref 4–5.4)
SODIUM SERPL-SCNC: 137 MMOL/L (ref 136–145)
WBC # BLD AUTO: 7.17 K/UL (ref 3.9–12.7)

## 2021-12-23 PROCEDURE — 83605 ASSAY OF LACTIC ACID: CPT | Performed by: HOSPITALIST

## 2021-12-23 PROCEDURE — 86140 C-REACTIVE PROTEIN: CPT | Performed by: SURGERY

## 2021-12-23 PROCEDURE — 87040 BLOOD CULTURE FOR BACTERIA: CPT | Mod: 59 | Performed by: HOSPITALIST

## 2021-12-23 PROCEDURE — 63600175 PHARM REV CODE 636 W HCPCS: Performed by: NURSE PRACTITIONER

## 2021-12-23 PROCEDURE — 80048 BASIC METABOLIC PNL TOTAL CA: CPT | Performed by: SURGERY

## 2021-12-23 PROCEDURE — C9113 INJ PANTOPRAZOLE SODIUM, VIA: HCPCS | Performed by: NURSE PRACTITIONER

## 2021-12-23 PROCEDURE — 63600175 PHARM REV CODE 636 W HCPCS: Performed by: SURGERY

## 2021-12-23 PROCEDURE — 27000221 HC OXYGEN, UP TO 24 HOURS

## 2021-12-23 PROCEDURE — 97116 GAIT TRAINING THERAPY: CPT

## 2021-12-23 PROCEDURE — 25000242 PHARM REV CODE 250 ALT 637 W/ HCPCS: Performed by: HOSPITALIST

## 2021-12-23 PROCEDURE — 36415 COLL VENOUS BLD VENIPUNCTURE: CPT | Performed by: HOSPITALIST

## 2021-12-23 PROCEDURE — 25000003 PHARM REV CODE 250: Performed by: HOSPITALIST

## 2021-12-23 PROCEDURE — 12000002 HC ACUTE/MED SURGE SEMI-PRIVATE ROOM

## 2021-12-23 PROCEDURE — 94761 N-INVAS EAR/PLS OXIMETRY MLT: CPT

## 2021-12-23 PROCEDURE — 99900035 HC TECH TIME PER 15 MIN (STAT)

## 2021-12-23 PROCEDURE — 36415 COLL VENOUS BLD VENIPUNCTURE: CPT | Performed by: SURGERY

## 2021-12-23 PROCEDURE — 85025 COMPLETE CBC W/AUTO DIFF WBC: CPT | Performed by: SURGERY

## 2021-12-23 PROCEDURE — 94799 UNLISTED PULMONARY SVC/PX: CPT

## 2021-12-23 PROCEDURE — 63600175 PHARM REV CODE 636 W HCPCS: Performed by: HOSPITALIST

## 2021-12-23 PROCEDURE — 94640 AIRWAY INHALATION TREATMENT: CPT

## 2021-12-23 RX ORDER — PANTOPRAZOLE SODIUM 40 MG/10ML
40 INJECTION, POWDER, LYOPHILIZED, FOR SOLUTION INTRAVENOUS ONCE
Status: COMPLETED | OUTPATIENT
Start: 2021-12-23 | End: 2021-12-23

## 2021-12-23 RX ADMIN — ALBUTEROL SULFATE 2.5 MG: 2.5 SOLUTION RESPIRATORY (INHALATION) at 12:12

## 2021-12-23 RX ADMIN — MUPIROCIN: 20 OINTMENT TOPICAL at 08:12

## 2021-12-23 RX ADMIN — MUPIROCIN: 20 OINTMENT TOPICAL at 09:12

## 2021-12-23 RX ADMIN — PIPERACILLIN AND TAZOBACTAM 4.5 G: 4; .5 INJECTION, POWDER, LYOPHILIZED, FOR SOLUTION INTRAVENOUS; PARENTERAL at 05:12

## 2021-12-23 RX ADMIN — METOCLOPRAMIDE 10 MG: 5 INJECTION, SOLUTION INTRAMUSCULAR; INTRAVENOUS at 12:12

## 2021-12-23 RX ADMIN — METOCLOPRAMIDE 10 MG: 5 INJECTION, SOLUTION INTRAMUSCULAR; INTRAVENOUS at 06:12

## 2021-12-23 RX ADMIN — MONTELUKAST 10 MG: 10 TABLET, FILM COATED ORAL at 08:12

## 2021-12-23 RX ADMIN — ALBUTEROL SULFATE 2.5 MG: 2.5 SOLUTION RESPIRATORY (INHALATION) at 07:12

## 2021-12-23 RX ADMIN — ENOXAPARIN SODIUM 40 MG: 100 INJECTION SUBCUTANEOUS at 05:12

## 2021-12-23 RX ADMIN — PIPERACILLIN AND TAZOBACTAM 4.5 G: 4; .5 INJECTION, POWDER, LYOPHILIZED, FOR SOLUTION INTRAVENOUS; PARENTERAL at 08:12

## 2021-12-23 RX ADMIN — PANTOPRAZOLE SODIUM 40 MG: 40 INJECTION, POWDER, LYOPHILIZED, FOR SOLUTION INTRAVENOUS at 03:12

## 2021-12-23 RX ADMIN — ATORVASTATIN CALCIUM 10 MG: 10 TABLET, FILM COATED ORAL at 08:12

## 2021-12-23 NOTE — CARE UPDATE
12/22/21 1935   Patient Assessment/Suction   Level of Consciousness (AVPU) alert   Respiratory Effort Unlabored;Normal   Expansion/Accessory Muscles/Retractions no retractions;no use of accessory muscles   All Lung Fields Breath Sounds diminished   Rhythm/Pattern, Respiratory shallow   PRE-TX-O2   O2 Device (Oxygen Therapy) room air   SpO2 (!) 90 %   Pulse Oximetry Type Intermittent   Pulse 93   Resp 17   Aerosol Therapy   $ Aerosol Therapy Charges Aerosol Treatment   Respiratory Treatment Status (SVN) given   Treatment Route (SVN) mask   Patient Position (SVN) HOB elevated   Signs of Intolerance (SVN) none   Breath Sounds Post-Respiratory Treatment   Throughout All Fields Post-Treatment All Fields   Throughout All Fields Post-Treatment aeration increased   Post-treatment Heart Rate (beats/min) 94   Post-treatment Resp Rate (breaths/min) 18

## 2021-12-23 NOTE — PT/OT/SLP PROGRESS
Physical Therapy Treatment    Patient Name:  Teressa Benson   MRN:  [id number]    Recommendations:     Discharge Recommendations:  home health PT   Discharge Equipment Recommendations: walker, rolling   Barriers to discharge: None    Assessment:     Teressa Benson is a 81 y.o. female admitted with a medical diagnosis of Cancer of ascending colon.  She presents with the following impairments/functional limitations:  weakness,impaired balance,impaired endurance,impaired cardiopulmonary response to activity,impaired functional mobilty,gait instability,decreased lower extremity function  .    Rehab Prognosis: Good and Fair; patient would benefit from acute skilled PT services to address these deficits and reach maximum level of function.    Recent Surgery: Procedure(s) (LRB):  COLECTOMY, LAPAROSCOPIC  ileocolic anastamosis (Right) 3 Days Post-Op    Plan:     During this hospitalization, patient to be seen 6 x/week to address the identified rehab impairments via gait training,therapeutic activities,therapeutic exercises and progress toward the following goals:    · Plan of Care Expires:  12/23/21    Subjective     Patient/Family Comments/goals: agrees to walk in [place], then sit in chair    Objective:     Communicated with nurse prior to session.  Patient found supine with SCD,oxygen upon PT entry to room.     General Precautions: Standard, fall   Orthopedic Precautions:N/A   Braces: N/A  Respiratory Status: Room air     Functional Mobility training:  · Bed Mobility:     · Rolling Right: contact guard assistance  · Supine to Sit: contact guard assistance  · Transfers:     · Sit to Stand:  contact guard assistance with rolling walker  · Gait: 250' with RW with CG/SBA O2 @ 1 L/min via nc      AM-PAC 6 CLICK MOBILITY  Turning over in bed (including adjusting bedclothes, sheets and blankets)?: 3  Sitting down on and standing up from a chair with arms (e.g., wheelchair, bedside commode, etc.): 3  Moving from lying on back to  sitting on the side of the bed?: 3  Moving to and from a bed to a chair (including a wheelchair)?: 3  Need to walk in hospital room?: 3  Climbing 3-5 steps with a railing?: 1  Basic Mobility Total Score: 16       Therapeutic Activities and Exercises:   mobility training as above with cues for technique  SEATED: LAQ, ankle DF/PF (rec'd to perform q 30-60 min)    Patient left up in chair with all lines intact, call button in reach and dgtr (?) present.    GOALS:   Multidisciplinary Problems     Physical Therapy Goals        Problem: Physical Therapy Goal    Goal Priority Disciplines Outcome Goal Variances Interventions   Physical Therapy Goal     PT, PT/OT Ongoing, Progressing     Description: Goals to be met by: 2021     Patient will increase functional independence with mobility by performin. Supine to sit with Contact Guard Assistance  2. Sit to stand transfer with Contact Guard Assistance  3. Bed to chair transfer with Contact Guard Assistance using Rolling Walker  4. Gait  x 250x2 feet with Contact Guard Assistance using Rolling Walker.   5. Lower extremity exercise program x20 reps                    Time Tracking:     PT Received On: 21  PT Start Time: 835     PT Stop Time: 0853  PT Total Time (min): 18 min     Billable Minutes: Gait Training 18    Treatment Type: Treatment  PT/PTA: PT     PTA Visit Number: 0     2021

## 2021-12-23 NOTE — PROGRESS NOTES
[facility] Hospital Medicine  Progress Note    Patient Name: Teressa Benson  MRN: [id number]  Patient Class: IP- Inpatient   Admission Date: 12/20/2021  Length of Stay: 3 days  Attending Physician: Lyssa Long MD  Primary Care Provider: Crow Bustos MD        Subjective:     Principal Problem:Cancer of ascending colon        HPI:  Patient is an 81F with history of COPD, HTN, arthritis who is seen today after laparoscopic R hemicolectomy with ileocolic anastomosis and excision of cyst from abdominal wall ( 2 cm) by Dr. Harrison. Colonoscopy in October showed mass in the ascending colon which was confirmed to be adenocarcinoma. That mass was removed during surgery today. Patient reports her pain is controlled at this time. Denies nausea or vomiting. Reports a sore throat after surgery which I explained can be from the ETT. She denies shortness of breath, le edema, or other complaints. She is accompanied by her 3 daughters.            Overview/Hospital Course:  No notes on file    Interval History: Patient seen and examined. Tm 100.5, infectious workup ordered. No complaints other than pain.    Review of Systems   Constitutional: Negative for chills and fatigue.   HENT: Negative for congestion and rhinorrhea.    Respiratory: Negative for cough, shortness of breath and wheezing.    Cardiovascular: Negative for chest pain, palpitations and leg swelling.   Gastrointestinal: Positive for abdominal pain (mild). Negative for abdominal distention, nausea and vomiting.   Genitourinary: Negative for dysuria and urgency.   Musculoskeletal: Negative for arthralgias and neck stiffness.   Skin: Negative for rash and wound.   Neurological: Negative for dizziness and weakness.     Objective:     Vital Signs (Most Recent):  Temp: 99 °F (37.2 °C) (12/23/21 0730)  Pulse: 80 (12/23/21 0737)  Resp: 15 (12/23/21 0737)  BP: 129/70 (12/23/21 0730)  SpO2: 95 % (12/23/21 0737) Vital Signs (24h Range):  Temp:  [97  °F (36.1 °C)-100.5 °F (38.1 °C)] 99 °F (37.2 °C)  Pulse:  [77-99] 80  Resp:  [15-20] 15  SpO2:  [90 %-97 %] 95 %  BP: (123-153)/(56-70) 129/70     Weight: 58.1 kg (128 lb)  Body mass index is 25.85 kg/m².  No intake or output data in the 24 hours ending 12/23/21 1140   Physical Exam  Constitutional:       General: She is not in acute distress.     Appearance: She is well-developed.   HENT:      Head: Normocephalic and atraumatic.   Eyes:      Pupils: Pupils are equal, round, and reactive to light.   Cardiovascular:      Rate and Rhythm: Normal rate and regular rhythm.      Heart sounds: No murmur heard.      Pulmonary:      Effort: Pulmonary effort is normal. No respiratory distress.      Breath sounds: Normal breath sounds. No wheezing or rales.   Abdominal:      General: There is no distension.      Palpations: Abdomen is soft.      Tenderness: There is no abdominal tenderness.      Comments: Bandages c/d/i   Musculoskeletal:         General: Normal range of motion.   Skin:     General: Skin is warm and dry.      Findings: No rash.   Neurological:      Mental Status: She is alert and oriented to person, place, and time.      Cranial Nerves: No cranial nerve deficit.   Psychiatric:         Behavior: Behavior normal.         Significant Labs: All pertinent labs within the past 24 hours have been reviewed.    Significant Imaging: I have reviewed all pertinent imaging results/findings within the past 24 hours.      Assessment/Plan:      * Cancer of ascending colon  S/p Laparoscopic R hemicolectomy with ileocolic anastomosis with Dr. Harrison 12/20  Monitor H/H - transfused 2 units 12/21  Incentive spirometry  Pain control  Nausea control  Follow surgery recommendations  PT/OT    Fever  Cover with Zosyn for now  Blood cultures, CXR, UA ordered      Mixed hyperlipidemia  Statin      Hypertension  Chronic, controlled.  Latest blood pressure and vitals reviewed-   Temp:  [97 °F (36.1 °C)-100.5 °F (38.1 °C)]   Pulse:  [77-99]    Resp:  [15-20]   BP: (123-153)/(56-70)   SpO2:  [90 %-97 %] .   Home meds for hypertension were reviewed and noted below.   Hypertension Medications             enalapril (VASOTEC) 10 MG tablet TAKE ONE TABLET BY MOUTH ONCE DAILY          While in the hospital, will manage blood pressure as follows; Continue home antihypertensive regimen    Will utilize p.r.n. blood pressure medication only if patient's blood pressure greater than  180/110 and she develops symptoms such as worsening chest pain or shortness of breath.        COPD (chronic obstructive pulmonary disease)  PRN albuterol for SOB/Wheezing  Singulair        VTE Risk Mitigation (From admission, onward)         Ordered     enoxaparin injection 40 mg  Daily         12/20/21 1350     IP VTE HIGH RISK PATIENT  Once         12/20/21 1350     Place sequential compression device  Until discontinued         12/20/21 1350     Place sequential compression device  Until discontinued         12/20/21 1350                Discharge Planning   GOMEZ:      Code Status: Prior   Is the patient medically ready for discharge?:     Reason for patient still in hospital (select all that apply): Patient trending condition and Treatment  Discharge Plan A: Home Health,Home with family                  Lyssa Long MD  Department of Hospital Medicine   [facility]

## 2021-12-23 NOTE — NURSING
Dr. Franco messaged about patient diet after daughter agitated because her mom has not had anything by mouth over the last 24 hours. She refuses to take the Reglan and states that she get heartburn after taking it. Dr. Franco informed patient that if anything it should make it better. Patient given a few ice chips and a clear diet ordered.

## 2021-12-23 NOTE — PLAN OF CARE
Patient is alert & oriented x4.  She was up the chair this evening and wanted to walk the lockhart with a walker but did not.  She suffered with indigestion and heartburn and I was able to get the NP to order IV protonix.  She is still NPO.  So no oral meds were given last night.  Her daughter stayed with her.  Her vital signs are stable and she did not complain of pain.  All safety precautions are in place.  Report will be given to oncoming nurse.  I will continue to monitor patient.

## 2021-12-23 NOTE — SUBJECTIVE & OBJECTIVE
Interval History: Patient seen and examined. Tm 100.5, infectious workup ordered. No complaints other than pain.    Review of Systems   Constitutional: Negative for chills and fatigue.   HENT: Negative for congestion and rhinorrhea.    Respiratory: Negative for cough, shortness of breath and wheezing.    Cardiovascular: Negative for chest pain, palpitations and leg swelling.   Gastrointestinal: Positive for abdominal pain (mild). Negative for abdominal distention, nausea and vomiting.   Genitourinary: Negative for dysuria and urgency.   Musculoskeletal: Negative for arthralgias and neck stiffness.   Skin: Negative for rash and wound.   Neurological: Negative for dizziness and weakness.     Objective:     Vital Signs (Most Recent):  Temp: 99 °F (37.2 °C) (12/23/21 0730)  Pulse: 80 (12/23/21 0737)  Resp: 15 (12/23/21 0737)  BP: 129/70 (12/23/21 0730)  SpO2: 95 % (12/23/21 0737) Vital Signs (24h Range):  Temp:  [97 °F (36.1 °C)-100.5 °F (38.1 °C)] 99 °F (37.2 °C)  Pulse:  [77-99] 80  Resp:  [15-20] 15  SpO2:  [90 %-97 %] 95 %  BP: (123-153)/(56-70) 129/70     Weight: 58.1 kg (128 lb)  Body mass index is 25.85 kg/m².  No intake or output data in the 24 hours ending 12/23/21 1140   Physical Exam  Constitutional:       General: She is not in acute distress.     Appearance: She is well-developed.   HENT:      Head: Normocephalic and atraumatic.   Eyes:      Pupils: Pupils are equal, round, and reactive to light.   Cardiovascular:      Rate and Rhythm: Normal rate and regular rhythm.      Heart sounds: No murmur heard.      Pulmonary:      Effort: Pulmonary effort is normal. No respiratory distress.      Breath sounds: Normal breath sounds. No wheezing or rales.   Abdominal:      General: There is no distension.      Palpations: Abdomen is soft.      Tenderness: There is no abdominal tenderness.      Comments: Bandages c/d/i   Musculoskeletal:         General: Normal range of motion.   Skin:     General: Skin is warm and  dry.      Findings: No rash.   Neurological:      Mental Status: She is alert and oriented to person, place, and time.      Cranial Nerves: No cranial nerve deficit.   Psychiatric:         Behavior: Behavior normal.         Significant Labs: All pertinent labs within the past 24 hours have been reviewed.    Significant Imaging: I have reviewed all pertinent imaging results/findings within the past 24 hours.

## 2021-12-23 NOTE — CARE UPDATE
12/23/21 0737   Patient Assessment/Suction   Level of Consciousness (AVPU) alert   Respiratory Effort Normal   Expansion/Accessory Muscles/Retractions expansion symmetric   All Lung Fields Breath Sounds Anterior:   MECHELLE Breath Sounds diminished   LLL Breath Sounds diminished   RUL Breath Sounds wheezes, inspiratory;diminished   RML Breath Sounds wheezes, inspiratory;diminished   RLL Breath Sounds diminished   Rhythm/Pattern, Respiratory pattern regular   Cough Frequency no cough   PRE-TX-O2   O2 Device (Oxygen Therapy) room air   Oxygen Concentration (%) 21   SpO2 95 %   Pulse Oximetry Type Intermittent   $ Pulse Oximetry - Multiple Charge Pulse Oximetry - Multiple   Probe Placed On (Pulse Ox) finger   Pulse 80   Resp 15   Positioning HOB elevated 30 degrees   Aerosol Therapy   $ Aerosol Therapy Charges Aerosol Treatment   Respiratory Treatment Status (SVN) given   Treatment Route (SVN) mask   Patient Position (SVN) HOB elevated   Signs of Intolerance (SVN) none   Inhaler   $ Inhaler Charges PRN treatment not required   Breath Sounds Post-Respiratory Treatment   Throughout All Fields Post-Treatment All Fields   Throughout All Fields Post-Treatment aeration increased   Incentive Spirometer   $ Incentive Spirometer Charges done with encouragement   Incentive Spirometer Predicted Level (mL) 1000   Administration (IS) proper technique demonstrated   Number of Repetitions (IS) 10   Level Incentive Spirometer (mL) 1000   Patient Tolerance (IS) good   POx, nebs TId,  MdI prn, IS QS

## 2021-12-23 NOTE — ASSESSMENT & PLAN NOTE
Chronic, controlled.  Latest blood pressure and vitals reviewed-   Temp:  [97 °F (36.1 °C)-100.5 °F (38.1 °C)]   Pulse:  [77-99]   Resp:  [15-20]   BP: (123-153)/(56-70)   SpO2:  [90 %-97 %] .   Home meds for hypertension were reviewed and noted below.   Hypertension Medications             enalapril (VASOTEC) 10 MG tablet TAKE ONE TABLET BY MOUTH ONCE DAILY          While in the hospital, will manage blood pressure as follows; Continue home antihypertensive regimen    Will utilize p.r.n. blood pressure medication only if patient's blood pressure greater than  180/110 and she develops symptoms such as worsening chest pain or shortness of breath.

## 2021-12-24 VITALS
TEMPERATURE: 97 F | BODY MASS INDEX: 25.8 KG/M2 | OXYGEN SATURATION: 96 % | HEART RATE: 71 BPM | SYSTOLIC BLOOD PRESSURE: 134 MMHG | HEIGHT: 59 IN | DIASTOLIC BLOOD PRESSURE: 61 MMHG | WEIGHT: 128 LBS | RESPIRATION RATE: 18 BRPM

## 2021-12-24 LAB
ANION GAP SERPL CALC-SCNC: 12 MMOL/L (ref 8–16)
BASOPHILS # BLD AUTO: 0.05 K/UL (ref 0–0.2)
BASOPHILS NFR BLD: 0.8 % (ref 0–1.9)
BUN SERPL-MCNC: 14 MG/DL (ref 8–23)
CALCIUM SERPL-MCNC: 8.1 MG/DL (ref 8.7–10.5)
CHLORIDE SERPL-SCNC: 101 MMOL/L (ref 95–110)
CO2 SERPL-SCNC: 24 MMOL/L (ref 23–29)
CREAT SERPL-MCNC: 0.6 MG/DL (ref 0.5–1.4)
DIFFERENTIAL METHOD: ABNORMAL
EOSINOPHIL # BLD AUTO: 0.2 K/UL (ref 0–0.5)
EOSINOPHIL NFR BLD: 2.6 % (ref 0–8)
ERYTHROCYTE [DISTWIDTH] IN BLOOD BY AUTOMATED COUNT: 18 % (ref 11.5–14.5)
EST. GFR  (AFRICAN AMERICAN): >60 ML/MIN/1.73 M^2
EST. GFR  (NON AFRICAN AMERICAN): >60 ML/MIN/1.73 M^2
GLUCOSE SERPL-MCNC: 100 MG/DL (ref 70–110)
HCT VFR BLD AUTO: 28.6 % (ref 37–48.5)
HGB BLD-MCNC: 8.6 G/DL (ref 12–16)
IMM GRANULOCYTES # BLD AUTO: 0.01 K/UL (ref 0–0.04)
IMM GRANULOCYTES NFR BLD AUTO: 0.2 % (ref 0–0.5)
LYMPHOCYTES # BLD AUTO: 1.8 K/UL (ref 1–4.8)
LYMPHOCYTES NFR BLD: 27.9 % (ref 18–48)
MCH RBC QN AUTO: 24.1 PG (ref 27–31)
MCHC RBC AUTO-ENTMCNC: 30.1 G/DL (ref 32–36)
MCV RBC AUTO: 80 FL (ref 82–98)
MONOCYTES # BLD AUTO: 1 K/UL (ref 0.3–1)
MONOCYTES NFR BLD: 14.8 % (ref 4–15)
NEUTROPHILS # BLD AUTO: 3.5 K/UL (ref 1.8–7.7)
NEUTROPHILS NFR BLD: 53.7 % (ref 38–73)
NRBC BLD-RTO: 0 /100 WBC
PLATELET # BLD AUTO: 290 K/UL (ref 150–450)
PMV BLD AUTO: 9.4 FL (ref 9.2–12.9)
POTASSIUM SERPL-SCNC: 3.1 MMOL/L (ref 3.5–5.1)
RBC # BLD AUTO: 3.57 M/UL (ref 4–5.4)
SODIUM SERPL-SCNC: 137 MMOL/L (ref 136–145)
WBC # BLD AUTO: 6.56 K/UL (ref 3.9–12.7)

## 2021-12-24 PROCEDURE — 27000221 HC OXYGEN, UP TO 24 HOURS

## 2021-12-24 PROCEDURE — 80048 BASIC METABOLIC PNL TOTAL CA: CPT | Performed by: SURGERY

## 2021-12-24 PROCEDURE — 99900035 HC TECH TIME PER 15 MIN (STAT)

## 2021-12-24 PROCEDURE — 97116 GAIT TRAINING THERAPY: CPT

## 2021-12-24 PROCEDURE — 94799 UNLISTED PULMONARY SVC/PX: CPT

## 2021-12-24 PROCEDURE — 36415 COLL VENOUS BLD VENIPUNCTURE: CPT | Performed by: SURGERY

## 2021-12-24 PROCEDURE — 85025 COMPLETE CBC W/AUTO DIFF WBC: CPT | Performed by: SURGERY

## 2021-12-24 PROCEDURE — 25000003 PHARM REV CODE 250: Performed by: HOSPITALIST

## 2021-12-24 PROCEDURE — 63600175 PHARM REV CODE 636 W HCPCS: Performed by: HOSPITALIST

## 2021-12-24 PROCEDURE — 94640 AIRWAY INHALATION TREATMENT: CPT

## 2021-12-24 PROCEDURE — 25000242 PHARM REV CODE 250 ALT 637 W/ HCPCS: Performed by: HOSPITALIST

## 2021-12-24 PROCEDURE — 94761 N-INVAS EAR/PLS OXIMETRY MLT: CPT

## 2021-12-24 RX ORDER — LANOLIN ALCOHOL/MO/W.PET/CERES
800 CREAM (GRAM) TOPICAL
Status: DISCONTINUED | OUTPATIENT
Start: 2021-12-24 | End: 2021-12-24 | Stop reason: HOSPADM

## 2021-12-24 RX ORDER — METRONIDAZOLE 500 MG/1
500 TABLET ORAL 3 TIMES DAILY
Qty: 15 TABLET | Refills: 0 | Status: SHIPPED | OUTPATIENT
Start: 2021-12-24 | End: 2021-12-29

## 2021-12-24 RX ORDER — SODIUM,POTASSIUM PHOSPHATES 280-250MG
2 POWDER IN PACKET (EA) ORAL
Status: DISCONTINUED | OUTPATIENT
Start: 2021-12-24 | End: 2021-12-24 | Stop reason: HOSPADM

## 2021-12-24 RX ORDER — CIPROFLOXACIN 500 MG/1
500 TABLET ORAL 2 TIMES DAILY
Qty: 10 TABLET | Refills: 0 | Status: SHIPPED | OUTPATIENT
Start: 2021-12-24 | End: 2021-12-29

## 2021-12-24 RX ADMIN — ALBUTEROL SULFATE 2.5 MG: 2.5 SOLUTION RESPIRATORY (INHALATION) at 07:12

## 2021-12-24 RX ADMIN — ENALAPRIL MALEATE 10 MG: 10 TABLET ORAL at 09:12

## 2021-12-24 RX ADMIN — ALBUTEROL SULFATE 2.5 MG: 2.5 SOLUTION RESPIRATORY (INHALATION) at 12:12

## 2021-12-24 RX ADMIN — PIPERACILLIN AND TAZOBACTAM 4.5 G: 4; .5 INJECTION, POWDER, LYOPHILIZED, FOR SOLUTION INTRAVENOUS; PARENTERAL at 09:12

## 2021-12-24 RX ADMIN — PIPERACILLIN AND TAZOBACTAM 4.5 G: 4; .5 INJECTION, POWDER, LYOPHILIZED, FOR SOLUTION INTRAVENOUS; PARENTERAL at 01:12

## 2021-12-24 NOTE — PLAN OF CARE
Pt is cleared from  for discharge.    [facility] .       12/24/21 1321   Final Note   Assessment Type Final Discharge Note   Anticipated Discharge Disposition Home-Health   Hospital Resources/Appts/Education Provided Appointments scheduled by Navigator/Coordinator  (please f/u with pcp)

## 2021-12-24 NOTE — CARE UPDATE
12/24/21 0746   Patient Assessment/Suction   Level of Consciousness (AVPU) alert   Respiratory Effort Normal   Expansion/Accessory Muscles/Retractions expansion symmetric   All Lung Fields Breath Sounds Anterior:   MECHELLE Breath Sounds diminished   LLL Breath Sounds diminished   RUL Breath Sounds diminished   RML Breath Sounds diminished   RLL Breath Sounds diminished   Rhythm/Pattern, Respiratory pattern regular   Cough Frequency no cough   PRE-TX-O2   O2 Device (Oxygen Therapy) nasal cannula   $ Is the patient on Low Flow Oxygen? Yes   Flow (L/min) 1   Oxygen Concentration (%) 24   SpO2 98 %   Pulse Oximetry Type Intermittent   $ Pulse Oximetry - Multiple Charge Pulse Oximetry - Multiple   Probe Placed On (Pulse Ox) finger   Pulse 65   Resp 18   Positioning HOB elevated 30 degrees   Aerosol Therapy   $ Aerosol Therapy Charges Aerosol Treatment   Respiratory Treatment Status (SVN) given   Treatment Route (SVN) mask   Patient Position (SVN) HOB elevated   Signs of Intolerance (SVN) none   Inhaler   $ Inhaler Charges PRN treatment not required   Breath Sounds Post-Respiratory Treatment   Throughout All Fields Post-Treatment All Fields   Throughout All Fields Post-Treatment no change   Incentive Spirometer   $ Incentive Spirometer Charges done with encouragement   Incentive Spirometer Predicted Level (mL) 1000   Administration (IS) proper technique demonstrated   Number of Repetitions (IS) 10   Level Incentive Spirometer (mL) 1000   Patient Tolerance (IS) good   POx, nebs TID, IS QS

## 2021-12-24 NOTE — PROGRESS NOTES
Patient seen and examined.  She looks comfortable.  Denies nausea at baseline.  She has some nausea with some medications.  Overall feels like she wants to try clear liquids.    Abdomen soft  Appropriately tender    Labs reviewed, stable    Overall making slow progress.  Some concerns about bowel obstruction.  Patient is not persistently nauseated and has not had emesis today.    Okay to trial clear liquids.  Should patient become persistently nauseated or have persistent emesis, would recommend repeat KUB in likely NG tube decompression at that point.  Patient likely has slow return of bowel function after abdominal surgery.    Following.

## 2021-12-24 NOTE — PROGRESS NOTES
Patient seen and examined.  Tolerating regular diet.  Requesting to go home.    Abdomen soft  Low-grade temperature overnight but otherwise hemodynamically stable      Labs stable    Doing well.  Tolerating regular diet.  Okay for DC from my standpoint later today.

## 2021-12-24 NOTE — SUBJECTIVE & OBJECTIVE
Interval History: Patient seen and examined. Tm 99. UA not collected but denies urinary symptoms, no evidence of pneumonia. Tolerating low residue diet. Discharge timing TBD by surgeon. Patient expresses wanting to go home today if possible    Review of Systems   Constitutional: Negative for chills and fatigue.   HENT: Negative for congestion and rhinorrhea.    Respiratory: Negative for cough, shortness of breath and wheezing.    Cardiovascular: Negative for chest pain, palpitations and leg swelling.   Gastrointestinal: Positive for abdominal pain (mild). Negative for abdominal distention, nausea and vomiting.   Genitourinary: Negative for dysuria and urgency.   Musculoskeletal: Negative for arthralgias and neck stiffness.   Skin: Negative for rash and wound.   Neurological: Negative for dizziness and weakness.     Objective:     Vital Signs (Most Recent):  Temp: 98.5 °F (36.9 °C) (12/24/21 0752)  Pulse: (!) 52 (12/24/21 0752)  Resp: 14 (12/24/21 0752)  BP: (!) 148/64 (12/24/21 0752)  SpO2: 97 % (12/24/21 0752) Vital Signs (24h Range):  Temp:  [96.8 °F (36 °C)-99 °F (37.2 °C)] 98.5 °F (36.9 °C)  Pulse:  [52-89] 52  Resp:  [14-20] 14  SpO2:  [93 %-98 %] 97 %  BP: (127-148)/(58-64) 148/64     Weight: 58.1 kg (128 lb)  Body mass index is 25.85 kg/m².    Intake/Output Summary (Last 24 hours) at 12/24/2021 1017  Last data filed at 12/24/2021 0613  Gross per 24 hour   Intake 200 ml   Output --   Net 200 ml      Physical Exam  Constitutional:       General: She is not in acute distress.     Appearance: She is well-developed.   HENT:      Head: Normocephalic and atraumatic.   Eyes:      Pupils: Pupils are equal, round, and reactive to light.   Cardiovascular:      Rate and Rhythm: Normal rate and regular rhythm.      Heart sounds: No murmur heard.      Pulmonary:      Effort: Pulmonary effort is normal. No respiratory distress.      Breath sounds: Normal breath sounds. No wheezing or rales.   Abdominal:      General: There  is no distension.      Palpations: Abdomen is soft.      Tenderness: There is no abdominal tenderness.      Comments: Bandages c/d/i   Musculoskeletal:         General: Normal range of motion.   Skin:     General: Skin is warm and dry.      Findings: No rash.   Neurological:      Mental Status: She is alert and oriented to person, place, and time.      Cranial Nerves: No cranial nerve deficit.   Psychiatric:         Behavior: Behavior normal.         Significant Labs: All pertinent labs within the past 24 hours have been reviewed.    Significant Imaging: I have reviewed all pertinent imaging results/findings within the past 24 hours.

## 2021-12-24 NOTE — PLAN OF CARE
POC/Meds reviewed, pt verbalized understanding. Vitals stable.  Afebrile. Midline incision with steri strips noted. 2 lap sites with band aid intact. Ambulated to bathroom. Up with x1 assist. Repositions self. Hourly/Q2hr rounding performed, safety maintained. Bed in lowest position, wheels locked, SR up x2, call light in easy reach. No  complaints at this time. Will continue to monitor.

## 2021-12-24 NOTE — DISCHARGE INSTRUCTIONS
Thank you for choosing [facility] for your medical care. The primary doctor who is taking care of you at the time of your discharge is Lyssa Long MD.     You were admitted to the hospital with Cancer of ascending colon.     Please note your discharge instructions, including diet/activity restrictions, follow-up appointments, and medication changes.  If you have any questions about your medical issues, prescriptions, or any other questions, please feel free to contact the [facility] Medicine Dept at [phone] and we will help.    If you are previously with Home health, outpatient PT/OT or under a therapy program, you are cleared to return to those programs.    Please direct all long term medication refills and follow up to your primary care provider, Crow Bustos MD. Thank you again for letting us take care of your health care needs.    Please note the following discharge instructions per your discharging physician-  Follow up with Dr. Harrison

## 2021-12-24 NOTE — PLAN OF CARE
Colten received the authorization to pull the walker from [facility].   delivered the walker and pt's daughter signed the delivery ticket. Cm will notify PT to adjust walker.    12:38 colten spoke with Lincoln in PT to please ask someone to adjust walker for pt.       12/24/21 1130   Post-Acute Status   Post-Acute Authorization HME   E Status Set-up Complete/Auth obtained

## 2021-12-24 NOTE — PT/OT/SLP PROGRESS
Physical Therapy Treatment    Patient Name:  Teressa Benson   MRN:  [id number]    Recommendations:     Discharge Recommendations:  home,home with home health,home health PT   Discharge Equipment Recommendations: walker, rolling   Barriers to discharge: None    Assessment:     Teressa Benson is a 81 y.o. female admitted with a medical diagnosis of Cancer of ascending colon.  She presents with the following impairments/functional limitations:  weakness,impaired balance,impaired endurance,impaired cardiopulmonary response to activity,impaired functional mobilty,gait instability,decreased lower extremity function  .    Rehab Prognosis: Good and Fair; patient would benefit from acute skilled PT services to address these deficits and reach maximum level of function.    Recent Surgery: Procedure(s) (LRB):  COLECTOMY, LAPAROSCOPIC  ileocolic anastamosis (Right) 4 Days Post-Op    Plan:     During this hospitalization, patient to be seen 6 x/week to address the identified rehab impairments via gait training,therapeutic activities,therapeutic exercises and progress toward the following goals:    · Plan of Care Expires:  12/23/21    Subjective     Patient/Family Comments/goals: agrees to work with PT to walk in lockhart    Objective:     Communicated with nurse prior to session.  Patient found up in chair with SCD,oxygen upon PT entry to room, family (dgtr?) present.    General Precautions: Standard, fall   Orthopedic Precautions:N/A   Braces: N/A  Respiratory Status: Room air O2 sats seated ~ 1 min after ambulation 97%     Functional Mobility training:  · Bed Mobility: N/T due to found in chair  · Transfers:     · Sit to Stand:  stand by assistance with rolling walker  · Gait: 250' (slow) with RW with CG/SBA       AM-PAC 6 CLICK MOBILITY          Therapeutic Activities and Exercises:   mobility training as above with cues for technique    Patient left up in chair with call button in reach, family present and breakfast set  up.    GOALS:   Multidisciplinary Problems     Physical Therapy Goals        Problem: Physical Therapy Goal    Goal Priority Disciplines Outcome Goal Variances Interventions   Physical Therapy Goal     PT, PT/OT Ongoing, Progressing     Description: Goals to be met by: 2021     Patient will increase functional independence with mobility by performin. Supine to sit with Contact Guard Assistance  2. Sit to stand transfer with Contact Guard Assistance  3. Bed to chair transfer with Contact Guard Assistance using Rolling Walker  4. Gait  x 250x2 feet with Contact Guard Assistance using Rolling Walker.   5. Lower extremity exercise program x20 reps                    Time Tracking:     PT Received On: 21  PT Start Time: 918     PT Stop Time: 931  PT Total Time (min): 13 min     Billable Minutes: Gait Training 13    Treatment Type: Treatment  PT/PTA: PT     PTA Visit Number: 0     2021

## 2021-12-24 NOTE — PLAN OF CARE
Cm sent referral to [facility] via [facility]. Pending acceptance.       12/24/21 1240   Post-Acute Status   Post-Acute Authorization Home Health   E Status Referrals Sent

## 2021-12-24 NOTE — CARE UPDATE
12/23/21 1925   Patient Assessment/Suction   Level of Consciousness (AVPU) alert   Respiratory Effort Normal   Expansion/Accessory Muscles/Retractions expansion symmetric   All Lung Fields Breath Sounds Anterior:;Lateral:   MECHELLE Breath Sounds diminished   LLL Breath Sounds diminished   RUL Breath Sounds wheezes, expiratory   RML Breath Sounds diminished   RLL Breath Sounds diminished   Rhythm/Pattern, Respiratory pattern regular   Cough Frequency no cough   PRE-TX-O2   O2 Device (Oxygen Therapy) nasal cannula   $ Is the patient on Low Flow Oxygen? Yes   Flow (L/min) 1   SpO2 96 %   Pulse Oximetry Type Intermittent   $ Pulse Oximetry - Multiple Charge Pulse Oximetry - Multiple   Probe Placed On (Pulse Ox) finger   Pulse 89   Resp 18   Positioning Sitting in chair   Aerosol Therapy   Respiratory Treatment Status (SVN) given   Treatment Route (SVN) mask   Patient Position (SVN) sitting in chair   Post Treatment Assessment (SVN) breath sounds unchanged   Signs of Intolerance (SVN) none   Inhaler   $ Inhaler Charges PRN treatment not required   Respiratory Treatment Status (Inhaler) PRN treatment not required   Breath Sounds Post-Respiratory Treatment   Throughout All Fields Post-Treatment All Fields   Throughout All Fields Post-Treatment aeration increased   Post-treatment Heart Rate (beats/min) 91   Post-treatment Resp Rate (breaths/min) 18   Incentive Spirometer   $ Incentive Spirometer Charges done with encouragement   Incentive Spirometer Predicted Level (mL) 1000   Administration (IS) instruction provided, follow-up;proper technique demonstrated   Number of Repetitions (IS) 10   Level Incentive Spirometer (mL) 1000   Patient Tolerance (IS) good

## 2021-12-24 NOTE — PLAN OF CARE
Pt is accepted at [facility] per Mir fermin. They will see pt on 12/25/2021.       12/24/21 1320   Post-Acute Status   Post-Acute Authorization Home Health   E Status Set-up Complete/Auth obtained

## 2021-12-24 NOTE — PROGRESS NOTES
[facility] Hospital Medicine  Progress Note    Patient Name: Teressa Benson  MRN: [id number]  Patient Class: IP- Inpatient   Admission Date: 12/20/2021  Length of Stay: 4 days  Attending Physician: Lyssa Long MD  Primary Care Provider: Crow Bustos MD        Subjective:     Principal Problem:Cancer of ascending colon        HPI:  Patient is an 81F with history of COPD, HTN, arthritis who is seen today after laparoscopic R hemicolectomy with ileocolic anastomosis and excision of cyst from abdominal wall ( 2 cm) by Dr. Harrison. Colonoscopy in October showed mass in the ascending colon which was confirmed to be adenocarcinoma. That mass was removed during surgery today. Patient reports her pain is controlled at this time. Denies nausea or vomiting. Reports a sore throat after surgery which I explained can be from the ETT. She denies shortness of breath, le edema, or other complaints. She is accompanied by her 3 daughters.            Overview/Hospital Course:  No notes on file    Interval History: Patient seen and examined. Tm 99. UA not collected but denies urinary symptoms, no evidence of pneumonia. Tolerating low residue diet. Discharge timing TBD by surgeon. Patient expresses wanting to go home today if possible    Review of Systems   Constitutional: Negative for chills and fatigue.   HENT: Negative for congestion and rhinorrhea.    Respiratory: Negative for cough, shortness of breath and wheezing.    Cardiovascular: Negative for chest pain, palpitations and leg swelling.   Gastrointestinal: Positive for abdominal pain (mild). Negative for abdominal distention, nausea and vomiting.   Genitourinary: Negative for dysuria and urgency.   Musculoskeletal: Negative for arthralgias and neck stiffness.   Skin: Negative for rash and wound.   Neurological: Negative for dizziness and weakness.     Objective:     Vital Signs (Most Recent):  Temp: 98.5 °F (36.9 °C) (12/24/21 0752)  Pulse: (!) 52  (12/24/21 0752)  Resp: 14 (12/24/21 0752)  BP: (!) 148/64 (12/24/21 0752)  SpO2: 97 % (12/24/21 0752) Vital Signs (24h Range):  Temp:  [96.8 °F (36 °C)-99 °F (37.2 °C)] 98.5 °F (36.9 °C)  Pulse:  [52-89] 52  Resp:  [14-20] 14  SpO2:  [93 %-98 %] 97 %  BP: (127-148)/(58-64) 148/64     Weight: 58.1 kg (128 lb)  Body mass index is 25.85 kg/m².    Intake/Output Summary (Last 24 hours) at 12/24/2021 1017  Last data filed at 12/24/2021 0613  Gross per 24 hour   Intake 200 ml   Output --   Net 200 ml      Physical Exam  Constitutional:       General: She is not in acute distress.     Appearance: She is well-developed.   HENT:      Head: Normocephalic and atraumatic.   Eyes:      Pupils: Pupils are equal, round, and reactive to light.   Cardiovascular:      Rate and Rhythm: Normal rate and regular rhythm.      Heart sounds: No murmur heard.      Pulmonary:      Effort: Pulmonary effort is normal. No respiratory distress.      Breath sounds: Normal breath sounds. No wheezing or rales.   Abdominal:      General: There is no distension.      Palpations: Abdomen is soft.      Tenderness: There is no abdominal tenderness.      Comments: Bandages c/d/i   Musculoskeletal:         General: Normal range of motion.   Skin:     General: Skin is warm and dry.      Findings: No rash.   Neurological:      Mental Status: She is alert and oriented to person, place, and time.      Cranial Nerves: No cranial nerve deficit.   Psychiatric:         Behavior: Behavior normal.         Significant Labs: All pertinent labs within the past 24 hours have been reviewed.    Significant Imaging: I have reviewed all pertinent imaging results/findings within the past 24 hours.      Assessment/Plan:      * Cancer of ascending colon  S/p Laparoscopic R hemicolectomy with ileocolic anastomosis with Dr. Harrison 12/20  Monitor H/H - transfused 2 units 12/21  Incentive spirometry  Pain control  Nausea control  Follow surgery recommendations  PT/OT    Fever  Cover  with Zosyn for now  Likely post-op fever  Encourage IS      Mixed hyperlipidemia  Statin      Hypertension  Chronic, controlled.  Latest blood pressure and vitals reviewed-   Temp:  [97 °F (36.1 °C)-100.5 °F (38.1 °C)]   Pulse:  [77-99]   Resp:  [15-20]   BP: (123-153)/(56-70)   SpO2:  [90 %-97 %] .   Home meds for hypertension were reviewed and noted below.   Hypertension Medications             enalapril (VASOTEC) 10 MG tablet TAKE ONE TABLET BY MOUTH ONCE DAILY          While in the hospital, will manage blood pressure as follows; Continue home antihypertensive regimen    Will utilize p.r.n. blood pressure medication only if patient's blood pressure greater than  180/110 and she develops symptoms such as worsening chest pain or shortness of breath.        COPD (chronic obstructive pulmonary disease)  PRN albuterol for SOB/Wheezing  Singulair        VTE Risk Mitigation (From admission, onward)         Ordered     enoxaparin injection 40 mg  Daily         12/20/21 1350     IP VTE HIGH RISK PATIENT  Once         12/20/21 1350     Place sequential compression device  Until discontinued         12/20/21 1350                Discharge Planning   GOMEZ:      Code Status: Prior   Is the patient medically ready for discharge?:     Reason for patient still in hospital (select all that apply): Patient trending condition and Treatment  Discharge Plan A: Home Health,Home with family                  Lyssa Long MD  Department of Hospital Medicine   [facility]

## 2021-12-25 ENCOUNTER — HOSPITAL ENCOUNTER (EMERGENCY)
Facility: HOSPITAL | Age: 81
Discharge: HOME | End: 2021-12-25
Attending: EMERGENCY MEDICINE
Payer: MEDICARE

## 2021-12-25 VITALS
HEIGHT: 59 IN | WEIGHT: 128 LBS | RESPIRATION RATE: 18 BRPM | OXYGEN SATURATION: 100 % | HEART RATE: 85 BPM | TEMPERATURE: 98 F | DIASTOLIC BLOOD PRESSURE: 60 MMHG | BODY MASS INDEX: 25.8 KG/M2 | SYSTOLIC BLOOD PRESSURE: 128 MMHG

## 2021-12-25 DIAGNOSIS — R11.2 NAUSEA & VOMITING: ICD-10-CM

## 2021-12-25 DIAGNOSIS — R12 HEARTBURN: ICD-10-CM

## 2021-12-25 DIAGNOSIS — R00.0 TACHYCARDIA: ICD-10-CM

## 2021-12-25 DIAGNOSIS — K29.00 ACUTE SUPERFICIAL GASTRITIS WITHOUT HEMORRHAGE: Primary | ICD-10-CM

## 2021-12-25 DIAGNOSIS — E87.6 HYPOKALEMIA: ICD-10-CM

## 2021-12-25 LAB
ALBUMIN SERPL BCP-MCNC: 3 G/DL (ref 3.5–5.2)
ALP SERPL-CCNC: 59 U/L (ref 55–135)
ALT SERPL W/O P-5'-P-CCNC: 18 U/L (ref 10–44)
ANION GAP SERPL CALC-SCNC: 16 MMOL/L (ref 8–16)
AST SERPL-CCNC: 18 U/L (ref 10–40)
BASOPHILS # BLD AUTO: 0.01 K/UL (ref 0–0.2)
BASOPHILS NFR BLD: 0.1 % (ref 0–1.9)
BILIRUB SERPL-MCNC: 1.7 MG/DL (ref 0.1–1)
BUN SERPL-MCNC: 14 MG/DL (ref 8–23)
CALCIUM SERPL-MCNC: 8.7 MG/DL (ref 8.7–10.5)
CHLORIDE SERPL-SCNC: 100 MMOL/L (ref 95–110)
CO2 SERPL-SCNC: 23 MMOL/L (ref 23–29)
CREAT SERPL-MCNC: 0.5 MG/DL (ref 0.5–1.4)
DIFFERENTIAL METHOD: ABNORMAL
EOSINOPHIL # BLD AUTO: 0 K/UL (ref 0–0.5)
EOSINOPHIL NFR BLD: 0.1 % (ref 0–8)
ERYTHROCYTE [DISTWIDTH] IN BLOOD BY AUTOMATED COUNT: 19 % (ref 11.5–14.5)
EST. GFR  (AFRICAN AMERICAN): >60 ML/MIN/1.73 M^2
EST. GFR  (NON AFRICAN AMERICAN): >60 ML/MIN/1.73 M^2
GLUCOSE SERPL-MCNC: 107 MG/DL (ref 70–110)
HCT VFR BLD AUTO: 31.5 % (ref 37–48.5)
HGB BLD-MCNC: 9.8 G/DL (ref 12–16)
IMM GRANULOCYTES # BLD AUTO: 0.04 K/UL (ref 0–0.04)
IMM GRANULOCYTES NFR BLD AUTO: 0.5 % (ref 0–0.5)
LYMPHOCYTES # BLD AUTO: 1 K/UL (ref 1–4.8)
LYMPHOCYTES NFR BLD: 11 % (ref 18–48)
MCH RBC QN AUTO: 25 PG (ref 27–31)
MCHC RBC AUTO-ENTMCNC: 31.1 G/DL (ref 32–36)
MCV RBC AUTO: 80 FL (ref 82–98)
MONOCYTES # BLD AUTO: 1 K/UL (ref 0.3–1)
MONOCYTES NFR BLD: 11.4 % (ref 4–15)
NEUTROPHILS # BLD AUTO: 6.7 K/UL (ref 1.8–7.7)
NEUTROPHILS NFR BLD: 76.9 % (ref 38–73)
NRBC BLD-RTO: 0 /100 WBC
PLATELET # BLD AUTO: 303 K/UL (ref 150–450)
PMV BLD AUTO: 8.9 FL (ref 9.2–12.9)
POTASSIUM SERPL-SCNC: 3.3 MMOL/L (ref 3.5–5.1)
PROT SERPL-MCNC: 6.6 G/DL (ref 6–8.4)
RBC # BLD AUTO: 3.92 M/UL (ref 4–5.4)
SODIUM SERPL-SCNC: 139 MMOL/L (ref 136–145)
TROPONIN I SERPL DL<=0.01 NG/ML-MCNC: 0.01 NG/ML (ref 0–0.03)
WBC # BLD AUTO: 8.7 K/UL (ref 3.9–12.7)

## 2021-12-25 PROCEDURE — 36415 COLL VENOUS BLD VENIPUNCTURE: CPT | Performed by: EMERGENCY MEDICINE

## 2021-12-25 PROCEDURE — 99285 EMERGENCY DEPT VISIT HI MDM: CPT | Mod: 25

## 2021-12-25 PROCEDURE — 25000003 PHARM REV CODE 250: Performed by: EMERGENCY MEDICINE

## 2021-12-25 PROCEDURE — 96374 THER/PROPH/DIAG INJ IV PUSH: CPT

## 2021-12-25 PROCEDURE — 84484 ASSAY OF TROPONIN QUANT: CPT | Performed by: EMERGENCY MEDICINE

## 2021-12-25 PROCEDURE — 80053 COMPREHEN METABOLIC PANEL: CPT | Performed by: EMERGENCY MEDICINE

## 2021-12-25 PROCEDURE — 63600175 PHARM REV CODE 636 W HCPCS: Performed by: EMERGENCY MEDICINE

## 2021-12-25 PROCEDURE — 85025 COMPLETE CBC W/AUTO DIFF WBC: CPT | Performed by: EMERGENCY MEDICINE

## 2021-12-25 PROCEDURE — 93005 ELECTROCARDIOGRAM TRACING: CPT

## 2021-12-25 RX ORDER — DROPERIDOL 2.5 MG/ML
1.25 INJECTION, SOLUTION INTRAMUSCULAR; INTRAVENOUS
Status: COMPLETED | OUTPATIENT
Start: 2021-12-25 | End: 2021-12-25

## 2021-12-25 RX ORDER — POTASSIUM CHLORIDE 20 MEQ/1
40 TABLET, EXTENDED RELEASE ORAL
Status: COMPLETED | OUTPATIENT
Start: 2021-12-25 | End: 2021-12-25

## 2021-12-25 RX ORDER — PANTOPRAZOLE SODIUM 40 MG/1
40 TABLET, DELAYED RELEASE ORAL
Status: COMPLETED | OUTPATIENT
Start: 2021-12-25 | End: 2021-12-25

## 2021-12-25 RX ORDER — SODIUM CHLORIDE 9 MG/ML
INJECTION, SOLUTION INTRAVENOUS
Status: COMPLETED | OUTPATIENT
Start: 2021-12-25 | End: 2021-12-25

## 2021-12-25 RX ORDER — POTASSIUM CHLORIDE 20 MEQ/1
20 TABLET, EXTENDED RELEASE ORAL 2 TIMES DAILY
Qty: 30 TABLET | Refills: 0 | Status: SHIPPED | OUTPATIENT
Start: 2021-12-25 | End: 2021-12-28

## 2021-12-25 RX ORDER — PANTOPRAZOLE SODIUM 40 MG/1
40 TABLET, DELAYED RELEASE ORAL DAILY
Qty: 30 TABLET | Refills: 3 | Status: SHIPPED | OUTPATIENT
Start: 2021-12-25 | End: 2023-10-31

## 2021-12-25 RX ORDER — ONDANSETRON HYDROCHLORIDE 8 MG/1
8 TABLET, FILM COATED ORAL EVERY 12 HOURS PRN
Qty: 8 TABLET | Refills: 1 | Status: SHIPPED | OUTPATIENT
Start: 2021-12-25 | End: 2021-12-28 | Stop reason: SDUPTHER

## 2021-12-25 RX ADMIN — SODIUM CHLORIDE: 0.9 INJECTION, SOLUTION INTRAVENOUS at 09:12

## 2021-12-25 RX ADMIN — PANTOPRAZOLE SODIUM 40 MG: 40 TABLET, DELAYED RELEASE ORAL at 11:12

## 2021-12-25 RX ADMIN — DROPERIDOL 1.25 MG: 2.5 INJECTION, SOLUTION INTRAMUSCULAR; INTRAVENOUS at 09:12

## 2021-12-25 RX ADMIN — POTASSIUM CHLORIDE 40 MEQ: 1500 TABLET, EXTENDED RELEASE ORAL at 11:12

## 2021-12-25 NOTE — ED PROVIDER NOTES
Encounter Date: 12/25/2021       History     Chief Complaint   Patient presents with    Vomiting     Started this am / recent admit surgery     Nausea     Chief complaint:  Nausea and vomiting    HPI:  81-year-old female who underwent a colectomy 4 days ago and was discharged yesterday presents with nausea and vomiting.  She also has heartburn.  She denies any abdominal pain at present.  She has had no melena, hematochezia or hematemesis.  She denies any fever or dysuria.  Past medical history is significant for colon cancer, COPD, hypertension and UTI.  She has had a previous cholecystectomy and appendectomy.        Review of patient's allergies indicates:   Allergen Reactions    Demerol [meperidine] Other (See Comments)     ams    Phenergan [promethazine] Other (See Comments)     ams     Past Medical History:   Diagnosis Date    Abnormal gastrointestinal PET scan 11/4/2021    Allergy     Arthritis     Asthma     Cancer of ascending colon 11/4/2021    Cataract     COPD (chronic obstructive pulmonary disease)     Diverticulitis     DJD (degenerative joint disease)     Full dentures     [place] (hard of hearing)     BILAT AIDS    Hypertension     IGT (impaired glucose tolerance)     Iron deficiency anemia due to chronic blood loss 11/4/2021    Liver nodule 11/4/2021    Other and unspecified hyperlipidemia     Rectal cancer 11/4/2021    Squamous cell carcinoma of skin 2020    left inner thigh    Urinary tract infection      Past Surgical History:   Procedure Laterality Date    APPENDECTOMY      CHOLECYSTECTOMY      COLONOSCOPY N/A 11/30/2021    Procedure: COLONOSCOPY WITH ENDOSCOPIC MUCOSAL RESECTION;  Surgeon: Raul Morales III, MD;  Location: [facility];  Service: Endoscopy;  Laterality: N/A;    COLONOSCOPY W/ POLYPECTOMY  11/30/2021    ENDOSCOPIC ULTRASOUND OF UPPER GASTROINTESTINAL TRACT  11/30/2021    ENDOSCOPIC ULTRASOUND OF UPPER GASTROINTESTINAL TRACT N/A 11/30/2021     Procedure: ULTRASOUND, UPPER GI TRACT, ENDOSCOPIC;  Surgeon: Raul Morales III, MD;  Location: [facility];  Service: Endoscopy;  Laterality: N/A;    EYE SURGERY      HYSTERECTOMY      TONSILLECTOMY, ADENOIDECTOMY       Family History   Problem Relation Age of Onset    Asthma Mother     Heart failure Father      Social History     Tobacco Use    Smoking status: Former Smoker     Types: Cigarettes    Smokeless tobacco: Never Used   Substance Use Topics    Alcohol use: No    Drug use: No     Review of Systems   Constitutional: Negative for activity change, appetite change, chills, fatigue and fever.   Eyes: Negative for visual disturbance.   Respiratory: Negative for apnea and shortness of breath.    Cardiovascular: Negative for chest pain and palpitations.   Gastrointestinal: Positive for nausea and vomiting. Negative for abdominal distention, abdominal pain, anal bleeding, blood in stool, constipation and diarrhea.   Genitourinary: Negative for difficulty urinating.   Musculoskeletal: Negative for neck pain.   Skin: Negative for pallor and rash.   Neurological: Negative for dizziness and headaches.   Hematological: Does not bruise/bleed easily.   Psychiatric/Behavioral: Negative for agitation.       Physical Exam     Initial Vitals [12/25/21 0820]   BP Pulse Resp Temp SpO2   135/63 (!) 132 20 98.8 °F (37.1 °C) 95 %      MAP       --         Physical Exam    Nursing note and vitals reviewed.  Constitutional: She appears well-developed and well-nourished.   HENT:   Head: Normocephalic and atraumatic.   Eyes: Conjunctivae are normal.   Neck: Neck supple.   Normal range of motion.  Cardiovascular: Normal rate, regular rhythm and normal heart sounds. Exam reveals no gallop and no friction rub.    No murmur heard.  Pulmonary/Chest: Effort normal and breath sounds normal. No respiratory distress. She has no wheezes. She has no rhonchi. She has no rales.   Abdominal: Abdomen is soft. She exhibits no distension.  There is no abdominal tenderness.   Musculoskeletal:         General: Normal range of motion.      Cervical back: Normal range of motion and neck supple.     Neurological: She is alert and oriented to person, place, and time.   Skin: Skin is warm and dry. No erythema.   Psychiatric: She has a normal mood and affect.         ED Course   Procedures  Labs Reviewed   CBC W/ AUTO DIFFERENTIAL - Abnormal; Notable for the following components:       Result Value    RBC 3.92 (*)     Hemoglobin 9.8 (*)     Hematocrit 31.5 (*)     MCV 80 (*)     MCH 25.0 (*)     MCHC 31.1 (*)     RDW 19.0 (*)     MPV 8.9 (*)     Gran % 76.9 (*)     Lymph % 11.0 (*)     All other components within normal limits   COMPREHENSIVE METABOLIC PANEL - Abnormal; Notable for the following components:    Potassium 3.3 (*)     Albumin 3.0 (*)     Total Bilirubin 1.7 (*)     All other components within normal limits   TROPONIN I     EKG Readings: (Independently Interpreted)   Rhythm: Sinus Arrhythmia. Heart Rate: 83. Ectopy: No Ectopy. Conduction: Normal. ST Segments: Normal ST Segments. T Waves: Normal. Axis: Normal. Clinical Impression: Sinus Arrhythmia       Imaging Results          X-Ray Chest AP Portable (Final result)  Result time 12/25/21 09:17:28    Final result by Elbert Fonseca MD (12/25/21 09:17:28)                 Impression:      No acute process.      Electronically signed by: Elbert Fonseca MD  Date:    12/25/2021  Time:    09:17             Narrative:    EXAMINATION:  XR CHEST AP PORTABLE    CLINICAL HISTORY:  Heartburn    TECHNIQUE:  Single frontal view of the chest was performed.    COMPARISON:  12/23/2021    FINDINGS:  Heart size is normal.  There is calcification of the aorta.  There are mild chronic interstitial changes.  There is no consolidation or pleural effusion.                               X-Ray Abdomen AP 1 View (Final result)  Result time 12/25/21 09:15:35    Final result by Elbert Fonseca MD (12/25/21  09:15:35)                 Impression:      Mild small bowel dilatation with moderate improvement.      Electronically signed by: Elbert Fonseca MD  Date:    12/25/2021  Time:    09:15             Narrative:    EXAMINATION:  XR ABDOMEN AP 1 VIEW    CLINICAL HISTORY:  Nausea with vomiting, unspecified    TECHNIQUE:  AP View(s) of the abdomen was performed.    COMPARISON:  12/22/2021    FINDINGS:  There are a few mildly dilated loops of small bowel measuring 3.5 cm.  There has been overall moderate improvement.  No evidence for free air on these images.  There has been a cholecystectomy.                                 Medications   potassium chloride SA CR tablet 40 mEq (has no administration in time range)   pantoprazole EC tablet 40 mg (has no administration in time range)   droperidoL injection 1.25 mg (1.25 mg Intravenous Given 12/25/21 0943)   0.9%  NaCl infusion ( Intravenous New Bag 12/25/21 0944)     Medical Decision Making:   Independently Interpreted Test(s):   I have ordered and independently interpreted X-rays - see summary below.       <> Summary of X-Ray Reading(s): Chest x-ray interpreted by me reveals no infiltrates, effusions or mediastinal widening  KUB independently interpreted by me demonstrates a normal gas pattern.  ED Management:  81-year-old female presents with nausea and vomiting.  She has no evidence of bowel obstruction on KUB.  She is treated empirically with Protonix for possible gastritis particularly with associated heartburn.  She has no evidence of cardiac ischemia/MI with a negative troponin.  She is found to have mild hypokalemia and is given oral potassium.  She is to return for intractable vomiting, abdominal pain or worsening chest pain.                      Clinical Impression:   Final diagnoses:  [R00.0] Tachycardia  [R12] Heartburn  [R11.2] Nausea & vomiting  [K29.00] Acute superficial gastritis without hemorrhage (Primary)  [E87.6] Hypokalemia          ED Disposition  Condition    Discharge Stable        ED Prescriptions     Medication Sig Dispense Start Date End Date Auth. Provider    potassium chloride SA (K-DUR,KLOR-CON) 20 MEQ tablet Take 1 tablet (20 mEq total) by mouth 2 (two) times daily. 30 tablet 12/25/2021  Travis Morin III, MD    ondansetron (ZOFRAN) 8 MG tablet Take 1 tablet (8 mg total) by mouth every 12 (twelve) hours as needed for Nausea. 8 tablet 12/25/2021  Travis Morin III, MD    pantoprazole (PROTONIX) 40 MG tablet Take 1 tablet (40 mg total) by mouth once daily. 30 tablet 12/25/2021 12/25/2022 Travis Morin III, MD        Follow-up Information    None          Travis Morin III, MD  12/25/21 1011

## 2021-12-25 NOTE — HOSPITAL COURSE
Patient was admitted to the hospital medicine service after laparoscopic right hemicolectomy with ileocolic anastomosis with Dr. Harrison.  She was monitored closely postoperatively and diet was advanced per general surgeon.  There was concern for ileus versus slow return of bowel function so patient was kept NPO for brief period of time in the diet was slowly advanced again after this.  Patient had a fever so was started antibiotics.  Infectious workup was negative.  Incentive spirometry was encouraged and out of an abundance of caution she was given a course of antibiotics on discharge.  She was cleared for discharge by general surgeon after tolerating regular diet.  She will follow up with Dr. Harrison.  Patient was offered prescriptions for antiemetics and p.o. narcotics on discharge and politely declined both.

## 2021-12-25 NOTE — DISCHARGE SUMMARY
[facility] Emergency Dept  [facility] Medicine  Discharge Summary      Patient Name: Teressa Benson  MRN: [id number]  Patient Class: Emergency  Admission Date: 12/25/2021  Hospital Length of Stay: 0 days  Discharge Date and Time: No discharge date for patient encounter.  Attending Physician: Travis Morin III, MD   Discharging Provider: Lyssa Long MD  Primary Care Provider: Crow Bustos MD      HPI:   No notes on file    * No surgery found *      Hospital Course:   Patient was admitted to the hospital medicine service after laparoscopic right hemicolectomy with ileocolic anastomosis with Dr. Harrison.  She was monitored closely postoperatively and diet was advanced per general surgeon.  There was concern for ileus versus slow return of bowel function so patient was kept NPO for brief period of time in the diet was slowly advanced again after this.  Patient had a fever so was started antibiotics.  Infectious workup was negative.  Incentive spirometry was encouraged and out of an abundance of caution she was given a course of antibiotics on discharge.  She was cleared for discharge by general surgeon after tolerating regular diet.  She will follow up with Dr. Harrison.  Patient was offered prescriptions for antiemetics and p.o. narcotics on discharge and politely declined both.       Goals of Care Treatment Preferences:  Code Status: Full Code      Consults:     No new Assessment & Plan notes have been filed under this hospital service since the last note was generated.  Service: Hospital Medicine    There are no hospital problems to display for this patient.      Discharged Condition: good    Disposition:     Follow Up:    Patient Instructions:   No discharge procedures on file.    Significant Diagnostic Studies: Labs:   BMP:   Lab 12/24/21  0333         K 3.1*      CO2 24   BUN 14   CREATININE 0.6   CALCIUM 8.1*    and CBC   Lab 12/24/21  0333 12/24/21  0333   WBC 6.56  --    HGB 8.6*  --     HCT 28.6*   < >     --        Pending Diagnostic Studies:     Procedure Component Value Units Date/Time    EKG 12-lead [[id number]]     Order Status: Sent Lab Status: No result          Medications:  Reconciled Home Medications:      Medication List      START taking these medications    ondansetron 8 MG tablet  Commonly known as: ZOFRAN  Take 1 tablet (8 mg total) by mouth every 12 (twelve) hours as needed for Nausea.     pantoprazole 40 MG tablet  Commonly known as: PROTONIX  Take 1 tablet (40 mg total) by mouth once daily.     potassium chloride SA 20 MEQ tablet  Commonly known as: K-DUR,KLOR-CON  Take 1 tablet (20 mEq total) by mouth 2 (two) times daily.        ASK your doctor about these medications    * albuterol 90 mcg/actuation inhaler  Commonly known as: PROVENTIL/VENTOLIN HFA  Inhale 2 puffs into the lungs every 6 (six) hours as needed for Wheezing. Rescue  every four to six hours as needed     * albuterol 2.5 mg /3 mL (0.083 %) nebulizer solution  Commonly known as: PROVENTIL  Take 2.5 mg by nebulization every 6 (six) hours as needed for Wheezing. Rescue   USES TID     ciprofloxacin HCl 500 MG tablet  Commonly known as: CIPRO  Take 1 tablet (500 mg total) by mouth 2 (two) times daily. for 5 days     enalapril 10 MG tablet  Commonly known as: VASOTEC  TAKE ONE TABLET BY MOUTH ONCE DAILY     metroNIDAZOLE 500 MG tablet  Commonly known as: FLAGYL  Take 1 tablet (500 mg total) by mouth 3 (three) times daily. for 5 days     montelukast 10 mg tablet  Commonly known as: SINGULAIR  TAKE ONE TABLET BY MOUTH ONCE DAILY     simvastatin 20 MG tablet  Commonly known as: ZOCOR  TAKE ONE TABLET BY MOUTH EVERY EVENING.         * This list has 2 medication(s) that are the same as other medications prescribed for you. Read the directions carefully, and ask your doctor or other care provider to review them with you.                Indwelling Lines/Drains at time of discharge:   Lines/Drains/Airways     None                  Time spent on the discharge of patient: 35 minutes       Back for heartburn  Lyssa Long MD  Department of Hospital Medicine  [facility] - Emergency Dept

## 2021-12-26 NOTE — DISCHARGE SUMMARY
[facility] Medicine  Discharge Summary      Patient Name: Teressa Benson  MRN: [id number]  Patient Class: IP- Inpatient  Admission Date: 12/20/2021  Hospital Length of Stay: 4 days  Discharge Date and Time: 12/24/2021  5:35 PM  Attending Physician: No att. providers found   Discharging Provider: Lyssa Long MD  Primary Care Provider: Crow Bustos MD      HPI:   Patient is an 81F with history of COPD, HTN, arthritis who is seen today after laparoscopic R hemicolectomy with ileocolic anastomosis and excision of cyst from abdominal wall ( 2 cm) by Dr. Harrison. Colonoscopy in October showed mass in the ascending colon which was confirmed to be adenocarcinoma. That mass was removed during surgery today. Patient reports her pain is controlled at this time. Denies nausea or vomiting. Reports a sore throat after surgery which I explained can be from the ETT. She denies shortness of breath, le edema, or other complaints. She is accompanied by her 3 daughters.            Procedure(s) (LRB):  COLECTOMY, LAPAROSCOPIC  ileocolic anastamosis (Right)      Hospital Course:   Patient was admitted to the hospital medicine service after laparoscopic right hemicolectomy with ileocolic anastomosis with Dr. Harrison.  She was monitored closely postoperatively and diet was advanced per general surgeon.  There was concern for ileus versus slow return of bowel function so patient was kept NPO for brief period of time in the diet was slowly advanced again after this.  Patient had a fever so was started antibiotics.  Infectious workup was negative.  Incentive spirometry was encouraged and out of an abundance of caution she was given a course of antibiotics on discharge.  She was cleared for discharge by general surgeon after tolerating regular diet.  She will follow up with Dr. Harrison.  Patient was offered prescriptions for antiemetics and p.o. narcotics on discharge and politely declined both.       Goals of  "Care Treatment Preferences:  Code Status: Full Code      Consults:     No new Assessment & Plan notes have been filed under this hospital service since the last note was generated.  Service: Hospital Medicine    Final Active Diagnoses:    Diagnosis Date Noted POA    PRINCIPAL PROBLEM:  Cancer of ascending colon [C18.2] 11/04/2021 Yes    Fever [R50.9] 12/23/2021 Yes    Mixed hyperlipidemia [E78.2] 05/17/2018 Yes    Hypertension [I10] 09/27/2017 Yes    COPD (chronic obstructive pulmonary disease) [J44.9] 09/27/2017 Yes      Problems Resolved During this Admission:       Discharged Condition: good    Disposition: Home or Self Care    Follow Up:   Follow-up Information     Aniceto Harrison MD.    Specialties: General Surgery, Colon and Rectal Surgery, Surgery  Contact information:  [address] [place] [address]  [phone]             [facility].    Contact information:  [address], [address]  [facility] [address] [phone]                     Patient Instructions:      WALKER FOR HOME USE     Order Specific Question Answer Comments   Type of Walker: Adult (5'4"-6'6")    With wheels? Yes    Height: 4' 11" (1.499 m)    Weight: 58.1 kg (128 lb)    Length of need (1-99 months): 12    Does patient have medical equipment at home? grab bar    Does patient have medical equipment at home? shower chair    Please check all that apply: Patient's condition impairs ambulation.      Ambulatory referral/consult to Home Health   Standing Status: Future   Referral Priority: Routine Referral Type: Home Health   Referral Reason: Specialty Services Required   Requested Specialty: Home Health Services   Number of Visits Requested: 1     Notify your health care provider if you experience any of the following:  temperature >100.4     Notify your health care provider if you experience any of the following:  persistent nausea and vomiting or diarrhea     Notify your health care provider if " you experience any of the following:  severe uncontrolled pain     Notify your health care provider if you experience any of the following:  redness, tenderness, or signs of infection (pain, swelling, redness, odor or green/yellow discharge around incision site)     Notify your health care provider if you experience any of the following:  difficulty breathing or increased cough     Notify your health care provider if you experience any of the following:  severe persistent headache     Notify your health care provider if you experience any of the following:  persistent dizziness, light-headedness, or visual disturbances     Notify your health care provider if you experience any of the following:  increased confusion or weakness     Activity as tolerated       Significant Diagnostic Studies: Labs:   Radiology Results (last 7 days)    Procedure Component Value Units Date/Time   X-Ray Chest 1 View [[id number]] Resulted: 12/23/21 1020   Order Status: Completed Updated: 12/23/21 1023   Narrative:     EXAMINATION:   XR CHEST 1 VIEW     CLINICAL HISTORY:   fever;     TECHNIQUE:   Single frontal view of the chest was performed.     COMPARISON:   11/26/2021     FINDINGS:   Perihilar interstitial disease with some tram-tracking on the left.  Unchanged heart size.  Aortic arch atherosclerosis.  No pleural effusion or pneumothorax.    Impression:       Perihilar interstitial disease could reflect bronchitis, asthma, viral or other atypical infection.       Electronically signed by: Faustino Avendaño   Date: 12/23/2021   Time: 10:20   X-Ray Abdomen Flat And Erect [[id number]] Resulted: 12/22/21 1429   Order Status: Completed Updated: 12/22/21 1432   Narrative:     EXAMINATION:   XR ABDOMEN FLAT AND ERECT     CLINICAL HISTORY:   evaluate bowel gas pattern;Malignant neoplasm of ascending colon     TECHNIQUE:   Flat and erect AP views of the abdomen were performed.     COMPARISON:   None     FINDINGS:   Surgical clips in the right upper  quadrant are consistent with prior cholecystectomy.  There is mild lumbar scoliosis convex to the left.  Multiple loops of air-filled small bowel are seen in the central abdomen.  Several jejunal loops are mildly dilated measuring up to 4 cm in thickness.  Significant air in the colon is not seen suggesting small-bowel obstruction.  No free air is seen.  No masses or calcifications are noted.    Impression:       Probable small bowel obstruction.  Prior cholecystectomy.       Electronically signed by: Lucas Kemp MD   Date: 12/22/2021   Time: 14:29         Pending Diagnostic Studies:     Procedure Component Value Units Date/Time    Specimen to Pathology, Surgery General Surgery [[id number]] Collected: 12/20/21 1130    Order Status: Sent Lab Status: In process Updated: 12/20/21 1308         Medications:  Reconciled Home Medications:      Medication List      START taking these medications    ciprofloxacin HCl 500 MG tablet  Commonly known as: CIPRO  Take 1 tablet (500 mg total) by mouth 2 (two) times daily. for 5 days     metroNIDAZOLE 500 MG tablet  Commonly known as: FLAGYL  Take 1 tablet (500 mg total) by mouth 3 (three) times daily. for 5 days        CHANGE how you take these medications    montelukast 10 mg tablet  Commonly known as: SINGULAIR  TAKE ONE TABLET BY MOUTH ONCE DAILY  What changed:   · how much to take  · how to take this  · when to take this     simvastatin 20 MG tablet  Commonly known as: ZOCOR  TAKE ONE TABLET BY MOUTH EVERY EVENING.  What changed:   · how much to take  · how to take this        CONTINUE taking these medications    * albuterol 90 mcg/actuation inhaler  Commonly known as: PROVENTIL/VENTOLIN HFA  Inhale 2 puffs into the lungs every 6 (six) hours as needed for Wheezing. Rescue  every four to six hours as needed     * albuterol 2.5 mg /3 mL (0.083 %) nebulizer solution  Commonly known as: PROVENTIL  Take 2.5 mg by nebulization every 6 (six) hours as needed for Wheezing. Rescue    USES TID     enalapril 10 MG tablet  Commonly known as: VASOTEC  TAKE ONE TABLET BY MOUTH ONCE DAILY         * This list has 2 medication(s) that are the same as other medications prescribed for you. Read the directions carefully, and ask your doctor or other care provider to review them with you.                Indwelling Lines/Drains at time of discharge:   Lines/Drains/Airways     None                 Time spent on the discharge of patient: 35 minutes         Lyssa Long MD  Department of Hospital Medicine  [facility]

## 2021-12-28 ENCOUNTER — OFFICE VISIT (OUTPATIENT)
Dept: SURGERY | Facility: CLINIC | Age: 81
End: 2021-12-28
Payer: MEDICARE

## 2021-12-28 VITALS
BODY MASS INDEX: 24.62 KG/M2 | DIASTOLIC BLOOD PRESSURE: 63 MMHG | HEIGHT: 59 IN | TEMPERATURE: 99 F | HEART RATE: 73 BPM | SYSTOLIC BLOOD PRESSURE: 128 MMHG | WEIGHT: 122.13 LBS

## 2021-12-28 DIAGNOSIS — Z98.890 POST-OPERATIVE NAUSEA AND VOMITING: Primary | ICD-10-CM

## 2021-12-28 DIAGNOSIS — R11.2 POST-OPERATIVE NAUSEA AND VOMITING: Primary | ICD-10-CM

## 2021-12-28 DIAGNOSIS — Z09 POSTOP CHECK: Primary | ICD-10-CM

## 2021-12-28 LAB
BACTERIA BLD CULT: NORMAL
BACTERIA BLD CULT: NORMAL

## 2021-12-28 PROCEDURE — 1126F PR PAIN SEVERITY QUANTIFIED, NO PAIN PRESENT: ICD-10-PCS | Mod: CPTII,S$GLB,, | Performed by: SURGERY

## 2021-12-28 PROCEDURE — 99024 POSTOP FOLLOW-UP VISIT: CPT | Mod: S$GLB,,, | Performed by: SURGERY

## 2021-12-28 PROCEDURE — 3074F SYST BP LT 130 MM HG: CPT | Mod: CPTII,S$GLB,, | Performed by: SURGERY

## 2021-12-28 PROCEDURE — 99999 PR PBB SHADOW E&M-EST. PATIENT-LVL III: CPT | Mod: PBBFAC,,, | Performed by: SURGERY

## 2021-12-28 PROCEDURE — 3074F PR MOST RECENT SYSTOLIC BLOOD PRESSURE < 130 MM HG: ICD-10-PCS | Mod: CPTII,S$GLB,, | Performed by: SURGERY

## 2021-12-28 PROCEDURE — 1126F AMNT PAIN NOTED NONE PRSNT: CPT | Mod: CPTII,S$GLB,, | Performed by: SURGERY

## 2021-12-28 PROCEDURE — 3078F DIAST BP <80 MM HG: CPT | Mod: CPTII,S$GLB,, | Performed by: SURGERY

## 2021-12-28 PROCEDURE — 3078F PR MOST RECENT DIASTOLIC BLOOD PRESSURE < 80 MM HG: ICD-10-PCS | Mod: CPTII,S$GLB,, | Performed by: SURGERY

## 2021-12-28 PROCEDURE — 99024 PR POST-OP FOLLOW-UP VISIT: ICD-10-PCS | Mod: S$GLB,,, | Performed by: SURGERY

## 2021-12-28 PROCEDURE — 99999 PR PBB SHADOW E&M-EST. PATIENT-LVL III: ICD-10-PCS | Mod: PBBFAC,,, | Performed by: SURGERY

## 2021-12-28 RX ORDER — POTASSIUM CHLORIDE 1500 MG/1
20 TABLET, EXTENDED RELEASE ORAL 2 TIMES DAILY
COMMUNITY
Start: 2021-12-25 | End: 2022-04-29 | Stop reason: ALTCHOICE

## 2021-12-28 NOTE — PROGRESS NOTES
Cc: post op    HPI: 81 y.o.  female  1 weeks s/p lap R kendra.   Pt doing well    PE: AFVSS    AAOx3  CTA  Soft/NT/nd  Inc: c/d/i        Path: pending    A/P:   Pt doing well post surgery.   F/U with me in 2 weeks

## 2021-12-29 DIAGNOSIS — R11.2 POST-OPERATIVE NAUSEA AND VOMITING: ICD-10-CM

## 2021-12-29 DIAGNOSIS — Z98.890 POST-OPERATIVE NAUSEA AND VOMITING: ICD-10-CM

## 2021-12-29 RX ORDER — ONDANSETRON HYDROCHLORIDE 8 MG/1
8 TABLET, FILM COATED ORAL EVERY 12 HOURS PRN
Qty: 8 TABLET | Refills: 1 | Status: SHIPPED | OUTPATIENT
Start: 2021-12-29 | End: 2021-12-29 | Stop reason: SDUPTHER

## 2021-12-29 RX ORDER — ONDANSETRON HYDROCHLORIDE 8 MG/1
8 TABLET, FILM COATED ORAL EVERY 12 HOURS PRN
Qty: 8 TABLET | Refills: 1 | Status: SHIPPED | OUTPATIENT
Start: 2021-12-29 | End: 2023-10-31

## 2022-01-04 LAB
FINAL PATHOLOGIC DIAGNOSIS: NORMAL
GROSS: NORMAL
Lab: NORMAL
MICROSCOPIC EXAM: NORMAL

## 2022-01-06 ENCOUNTER — LAB VISIT (OUTPATIENT)
Dept: LAB | Facility: HOSPITAL | Age: 82
End: 2022-01-06
Attending: INTERNAL MEDICINE
Payer: MEDICARE

## 2022-01-06 DIAGNOSIS — R73.01 IMPAIRED FASTING GLUCOSE: ICD-10-CM

## 2022-01-06 DIAGNOSIS — D50.9 IRON DEFICIENCY ANEMIA, UNSPECIFIED: ICD-10-CM

## 2022-01-06 DIAGNOSIS — R53.83 FATIGUE: Primary | ICD-10-CM

## 2022-01-06 DIAGNOSIS — C18.2 MALIGNANT NEOPLASM OF ASCENDING COLON: ICD-10-CM

## 2022-01-06 LAB
BASOPHILS # BLD AUTO: 0.05 K/UL (ref 0–0.2)
BASOPHILS NFR BLD: 0.8 % (ref 0–1.9)
DIFFERENTIAL METHOD: ABNORMAL
EOSINOPHIL # BLD AUTO: 0.1 K/UL (ref 0–0.5)
EOSINOPHIL NFR BLD: 1.7 % (ref 0–8)
ERYTHROCYTE [DISTWIDTH] IN BLOOD BY AUTOMATED COUNT: 23.9 % (ref 11.5–14.5)
ESTIMATED AVG GLUCOSE: 103 MG/DL (ref 68–131)
HBA1C MFR BLD: 5.2 % (ref 4–5.6)
HCT VFR BLD AUTO: 40.5 % (ref 37–48.5)
HGB BLD-MCNC: 11.9 G/DL (ref 12–16)
IMM GRANULOCYTES # BLD AUTO: 0.03 K/UL (ref 0–0.04)
IMM GRANULOCYTES NFR BLD AUTO: 0.5 % (ref 0–0.5)
LYMPHOCYTES # BLD AUTO: 1.5 K/UL (ref 1–4.8)
LYMPHOCYTES NFR BLD: 22 % (ref 18–48)
MCH RBC QN AUTO: 25.1 PG (ref 27–31)
MCHC RBC AUTO-ENTMCNC: 29.4 G/DL (ref 32–36)
MCV RBC AUTO: 85 FL (ref 82–98)
MONOCYTES # BLD AUTO: 0.6 K/UL (ref 0.3–1)
MONOCYTES NFR BLD: 9.4 % (ref 4–15)
NEUTROPHILS # BLD AUTO: 4.3 K/UL (ref 1.8–7.7)
NEUTROPHILS NFR BLD: 65.6 % (ref 38–73)
NRBC BLD-RTO: 0 /100 WBC
PLATELET # BLD AUTO: 456 K/UL (ref 150–450)
PMV BLD AUTO: 9.5 FL (ref 9.2–12.9)
POTASSIUM SERPL-SCNC: 4.5 MMOL/L (ref 3.5–5.1)
RBC # BLD AUTO: 4.75 M/UL (ref 4–5.4)
TSH SERPL DL<=0.005 MIU/L-ACNC: 1.28 UIU/ML (ref 0.4–4)
WBC # BLD AUTO: 6.58 K/UL (ref 3.9–12.7)

## 2022-01-06 PROCEDURE — 83036 HEMOGLOBIN GLYCOSYLATED A1C: CPT | Performed by: INTERNAL MEDICINE

## 2022-01-06 PROCEDURE — 84443 ASSAY THYROID STIM HORMONE: CPT | Performed by: INTERNAL MEDICINE

## 2022-01-06 PROCEDURE — 85025 COMPLETE CBC W/AUTO DIFF WBC: CPT | Performed by: INTERNAL MEDICINE

## 2022-01-06 PROCEDURE — 84132 ASSAY OF SERUM POTASSIUM: CPT | Performed by: INTERNAL MEDICINE

## 2022-01-07 NOTE — PROGRESS NOTES
Cc: post op    HPI: 81 y.o.  female  3 weeks s/p R kendra.   Pt doing well.  No pain or discomfort.  Pain controlled.  No n/v.     PE: AFVSS    AAOx3  CTA  Soft/NT/nd  Inc: c/d/i        Path: ReviewedSt II colon cancer   0/44 nodes  No LVi.  No perineural invasion.  T3NO     A/P:   Pt doing well post surgery.   F/U with me in 3 months  Pt scheduled for oncology visit with Dr Warren.

## 2022-01-11 ENCOUNTER — OFFICE VISIT (OUTPATIENT)
Dept: SURGERY | Facility: CLINIC | Age: 82
End: 2022-01-11
Payer: MEDICARE

## 2022-01-11 ENCOUNTER — TELEPHONE (OUTPATIENT)
Dept: SURGERY | Facility: CLINIC | Age: 82
End: 2022-01-11
Payer: MEDICARE

## 2022-01-11 VITALS
HEIGHT: 59 IN | HEART RATE: 74 BPM | SYSTOLIC BLOOD PRESSURE: 122 MMHG | DIASTOLIC BLOOD PRESSURE: 61 MMHG | TEMPERATURE: 98 F | RESPIRATION RATE: 16 BRPM | BODY MASS INDEX: 23.6 KG/M2 | WEIGHT: 117.06 LBS

## 2022-01-11 DIAGNOSIS — Z09 POSTOP CHECK: Primary | ICD-10-CM

## 2022-01-11 DIAGNOSIS — C18.2 MALIGNANT NEOPLASM OF ASCENDING COLON: ICD-10-CM

## 2022-01-11 PROCEDURE — 1126F AMNT PAIN NOTED NONE PRSNT: CPT | Mod: CPTII,S$GLB,, | Performed by: SURGERY

## 2022-01-11 PROCEDURE — 1159F MED LIST DOCD IN RCRD: CPT | Mod: CPTII,S$GLB,, | Performed by: SURGERY

## 2022-01-11 PROCEDURE — 3078F DIAST BP <80 MM HG: CPT | Mod: CPTII,S$GLB,, | Performed by: SURGERY

## 2022-01-11 PROCEDURE — 99999 PR PBB SHADOW E&M-EST. PATIENT-LVL III: CPT | Mod: PBBFAC,,, | Performed by: SURGERY

## 2022-01-11 PROCEDURE — 1101F PT FALLS ASSESS-DOCD LE1/YR: CPT | Mod: CPTII,S$GLB,, | Performed by: SURGERY

## 2022-01-11 PROCEDURE — 3078F PR MOST RECENT DIASTOLIC BLOOD PRESSURE < 80 MM HG: ICD-10-PCS | Mod: CPTII,S$GLB,, | Performed by: SURGERY

## 2022-01-11 PROCEDURE — 99024 POSTOP FOLLOW-UP VISIT: CPT | Mod: S$GLB,,, | Performed by: SURGERY

## 2022-01-11 PROCEDURE — 1126F PR PAIN SEVERITY QUANTIFIED, NO PAIN PRESENT: ICD-10-PCS | Mod: CPTII,S$GLB,, | Performed by: SURGERY

## 2022-01-11 PROCEDURE — 99999 PR PBB SHADOW E&M-EST. PATIENT-LVL III: ICD-10-PCS | Mod: PBBFAC,,, | Performed by: SURGERY

## 2022-01-11 PROCEDURE — 99024 PR POST-OP FOLLOW-UP VISIT: ICD-10-PCS | Mod: S$GLB,,, | Performed by: SURGERY

## 2022-01-11 PROCEDURE — 3288F PR FALLS RISK ASSESSMENT DOCUMENTED: ICD-10-PCS | Mod: CPTII,S$GLB,, | Performed by: SURGERY

## 2022-01-11 PROCEDURE — 3074F SYST BP LT 130 MM HG: CPT | Mod: CPTII,S$GLB,, | Performed by: SURGERY

## 2022-01-11 PROCEDURE — 1101F PR PT FALLS ASSESS DOC 0-1 FALLS W/OUT INJ PAST YR: ICD-10-PCS | Mod: CPTII,S$GLB,, | Performed by: SURGERY

## 2022-01-11 PROCEDURE — 3074F PR MOST RECENT SYSTOLIC BLOOD PRESSURE < 130 MM HG: ICD-10-PCS | Mod: CPTII,S$GLB,, | Performed by: SURGERY

## 2022-01-11 PROCEDURE — 3288F FALL RISK ASSESSMENT DOCD: CPT | Mod: CPTII,S$GLB,, | Performed by: SURGERY

## 2022-01-11 PROCEDURE — 1159F PR MEDICATION LIST DOCUMENTED IN MEDICAL RECORD: ICD-10-PCS | Mod: CPTII,S$GLB,, | Performed by: SURGERY

## 2022-01-11 NOTE — TELEPHONE ENCOUNTER
----- Message from Brittany Garay sent at 1/11/2022  3:23 PM CST -----  Contact: josue dotson  Type:  Needs Medical Advice    Who Called:  Daughter       Would the patient rather a call back or a response via [facility]?  Call    Best Call Back Number:  [phone]     Additional Information:  Patients  states Dr harrison wants patient to see Dr Mar  and she states that office needs a referral from Dr Harrison     Please fax a referral to Dr Mar office please

## 2022-01-24 PROBLEM — Z90.49 S/P RIGHT COLECTOMY: Status: ACTIVE | Noted: 2022-01-24

## 2022-01-24 NOTE — PROGRESS NOTES
[facility] Hematology/Oncology  PROGRESS NOTE - 2nd Follow-up Visit      Subjective:       Patient ID:   NAME: Teressa Benson : 1940     81 y.o. female    Referring Doc: Marie  Other Physicians: Colin Harrison           Chief Complaint: colorectal cancer f/u     History of Present Illness:     Patient returns today for a 2nd regularly scheduled follow-up visit.  The patient is here today to go over the results of the recently ordered labs, tests and studies. She last showed for an appointment on 2021. She was hospitalized at [facility] in Dec 2021. She is s/p laparoscopic Rght hemicolectomy with ileocolic anastomosis and excision of cyst from abdominal wall by Dr. Harrison on 2021. She had some post-operative ileus and fever post-op.She saw Dr Harrison for follow-up visit on 2022.  She is here with her daughter.     She has healed well and is no longer using walker.    Some chest congestion and fever and she needs to get tested covid.    Discussed Covid-19 precautions and she had her vaccinations            ROS:   GEN: normal without any fever, night sweats or weight loss  HEENT: normal with no HA's, sore throat, stiff neck, changes in vision  CV: normal with no CP, SOB, PND, FOSTER or orthopnea  PULM: normal with no SOB, cough, hemoptysis, sputum or pleuritic pain  GI: normal with no abdominal pain, nausea, vomiting, constipation, diarrhea, melanotic stools, BRBPR, or hematemesis  : normal with no hematuria, dysuria  BREAST: normal with no mass, discharge, pain  SKIN: normal with no rash, erythema, bruising, or swelling    Pain Scale: 0    Allergies:  Review of patient's allergies indicates:   Allergen Reactions    Demerol [meperidine] Other (See Comments)     ams    Phenergan [promethazine] Other (See Comments)     ams       Medications:    Current Outpatient Medications:     albuterol (PROVENTIL) 2.5 mg /3 mL (0.083 %) nebulizer solution, Take 2.5 mg by nebulization every 6 (six) hours as needed  for Wheezing. Rescue   USES TID, Disp: , Rfl:     enalapril (VASOTEC) 10 MG tablet, TAKE ONE TABLET BY MOUTH ONCE DAILY, Disp: 90 tablet, Rfl: 2    montelukast (SINGULAIR) 10 mg tablet, TAKE ONE TABLET BY MOUTH ONCE DAILY (Patient taking differently: every evening.), Disp: 90 tablet, Rfl: 2    ondansetron (ZOFRAN) 8 MG tablet, Take 1 tablet (8 mg total) by mouth every 12 (twelve) hours as needed for Nausea., Disp: 8 tablet, Rfl: 1    pantoprazole (PROTONIX) 40 MG tablet, Take 1 tablet (40 mg total) by mouth once daily., Disp: 30 tablet, Rfl: 3    potassium chloride (K-TAB) 20 mEq, Take 20 mEq by mouth 2 (two) times daily., Disp: , Rfl:     simvastatin (ZOCOR) 20 MG tablet, TAKE ONE TABLET BY MOUTH EVERY EVENING. (Patient taking differently: every evening.), Disp: 90 tablet, Rfl: 1    PMHx/PSHx Updates:  See patient's last visit with me on 11/5/2021.  See H&P on 11/5/2021        Pathology:  Cancer Staging  No matching staging information was found for the patient.    Colectomy 12/20/2021:  Diagnosis 1. Skin, abdomen skin cyst, excision:   - FOLLICULAR CYST, INFUNDIBULAR TYPE.   This lesion is benign.   Note:  This portion of the case was diagnosed by Jazlyn Grace MD   (dermatopathology).   2. Colon, right, hemicolectomy:   - Invasive adenocarcinoma, well differentiated   - Tumor measures 3 cm in maximal dimension   - Tumor invades through the muscularis propria into the pericolonic adipose   tissue   - Margins of resection are negative for malignancy   - Forty four lymph nodes, negative for malignancy (0/44)     SITE, PROCEDURE:  Colon, right, hemicolectomy   HISTOLOGIC TYPE:  Invasive adenocarcinoma   HISTOLOGIC GRADE:  Grade 1 (well-differentiated)   TUMOR EXTENSION:  Tumor invades through the muscularis propria into the   surrounding pericolonic adipose tissue   TUMOR SIZE:  3 cm   TUMOR SITE: Ascending colon   MACROSCOPIC TUMOR PERFORATION: Not identified   MARGINS: All margins are uninvolved by  "invasive carcinoma, high grade   dysplasia, intramucosal carcinoma, and low grade dysplasia       LYMPHOVASCULAR INVASION: Not identified   PERINEURAL INVASION: Not identified   TUMOR DEPOSITS: Not identified   REGIONAL LYMPH NODES: Uninvolved by tumor        Number of lymph nodes involved: 0        Number of lymph nodes examined: 44     PATHOLOGIC STAGE: pT3 pN0     MICROSATELLITE INSTABILITY TESTING   Immunohistochemistry (IHC) Testing for Mismatch Repair (MMR) Proteins:    MLH1 - Intact nuclear expression    MSH2 - Intact nuclear expression    MSH6 - Intact nuclear expression    PMS2 - Intact nuclear expression         Objective:     Vitals:  Blood pressure 121/75, pulse 101, temperature 100 °F (37.8 °C), resp. rate 20, height 4' 11" (1.499 m), weight 53.5 kg (118 lb).    Physical Examination:   GEN: no apparent distress, comfortable; AAOx3  HEAD: atraumatic and normocephalic  EYES: no pallor, no icterus, PERRLA  ENT: OMM, no pharyngeal erythema, external ears WNL; no nasal discharge; no thrush  NECK: no masses, thyroid normal, trachea midline, no LAD/LN's, supple  CV: RRR with no murmur; normal pulse; normal S1 and S2; no pedal edema  CHEST: Normal respiratory effort; CTAB; normal breath sounds; no wheeze or crackles  ABDOM: nontender and nondistended; soft; normal bowel sounds; no rebound/guarding; healing well post-op  MUSC/Skeletal: ROM normal; no crepitus; joints normal; no deformities or arthropathy  EXTREM: no clubbing, cyanosis, inflammation or swelling  SKIN: no rashes, lesions, ulcers, petechiae or subcutaneous nodules  : no ortega  NEURO: grossly intact; motor/sensory WNL; AAOx3; no tremors  PSYCH: normal mood, affect and behavior  LYMPH: normal cervical, supraclavicular, axillary and groin LN's            Labs:     Lab Results   Component Value Date    WBC 6.58 01/06/2022    HGB 11.9 (L) 01/06/2022    HCT 40.5 01/06/2022    MCV 85 01/06/2022     (H) 01/06/2022         CMP  Sodium   Date Value " Ref Range Status   12/25/2021 139 136 - 145 mmol/L Final   05/22/2018 137 134 - 144 mmol/L      Potassium   Date Value Ref Range Status   01/06/2022 4.5 3.5 - 5.1 mmol/L Final     Chloride   Date Value Ref Range Status   12/25/2021 100 95 - 110 mmol/L Final   05/22/2018 100 98 - 110 mmol/L      CO2   Date Value Ref Range Status   12/25/2021 23 23 - 29 mmol/L Final     Glucose   Date Value Ref Range Status   12/25/2021 107 70 - 110 mg/dL Final   05/22/2018 107 (H) 70 - 99 mg/dL      BUN   Date Value Ref Range Status   12/25/2021 14 8 - 23 mg/dL Final     Creatinine   Date Value Ref Range Status   12/25/2021 0.5 0.5 - 1.4 mg/dL Final   05/22/2018 0.70 0.60 - 1.40 mg/dL      Calcium   Date Value Ref Range Status   12/25/2021 8.7 8.7 - 10.5 mg/dL Final     Total Protein   Date Value Ref Range Status   12/25/2021 6.6 6.0 - 8.4 g/dL Final     Albumin   Date Value Ref Range Status   12/25/2021 3.0 (L) 3.5 - 5.2 g/dL Final   05/22/2018 4.2 3.1 - 4.7 g/dL      Total Bilirubin   Date Value Ref Range Status   12/25/2021 1.7 (H) 0.1 - 1.0 mg/dL Final     Comment:     For infants and newborns, interpretation of results should be based  on gestational age, weight and in agreement with clinical  observations.    Premature Infant recommended reference ranges:  Up to 24 hours.............<8.0 mg/dL  Up to 48 hours............<12.0 mg/dL  3-5 days..................<15.0 mg/dL  6-29 days.................<15.0 mg/dL       Alkaline Phosphatase   Date Value Ref Range Status   12/25/2021 59 55 - 135 U/L Final     AST   Date Value Ref Range Status   12/25/2021 18 10 - 40 U/L Final     ALT   Date Value Ref Range Status   12/25/2021 18 10 - 44 U/L Final     Anion Gap   Date Value Ref Range Status   12/25/2021 16 8 - 16 mmol/L Final     eGFR if    Date Value Ref Range Status   12/25/2021 >60 >60 mL/min/1.73 m^2 Final     eGFR if non    Date Value Ref Range Status   12/25/2021 >60 >60 mL/min/1.73 m^2 Final      Comment:     Calculation used to obtain the estimated glomerular filtration  rate (eGFR) is the CKD-EPI equation.              Radiology/Diagnostic Studies:    PET scan  10/28/2021:     IMPRESSION:     Multiple foci of increased FDG uptake within the colon, most suspicious along the posterior aspect of the rectum, potentially reflecting colorectal carcinoma. Please correlate with colonoscopy results.     Small focus of increased FDG uptake involving left hepatic lobe, without definite CT correlate. Hepatic metastatic disease cannot be excluded with confidence. Dedicated hepatic protocol MRI is recommended for further assessment.     No evidence of extra abdominal FDG avid metastatic disease.       I have reviewed all available lab results and radiology reports.    Assessment/Plan:   (1) 81 y.o. female  with diagnosis of colorectal cancer who has been referred by Dr Evelyn Salazar with GI for evaluation by medical hematology/oncology. She had EGD and colonoscopy with Dr Salazar on 10/12/2021. Biopsy of the ascending colon came back showing infiltrating well-differentiated adenocarcinoma     - recent PEt on 10/28/2021 with multiple foci of increased FDG uptake within the colon, most suspicious along the posterior aspect of the rectum, potentially reflecting colorectal carcinoma; and a small focus of increased FDG uptake involving left hepatic lobe     - She was found to be anemic by her PCP and her occult stool test was positive, so she was referred to GI. She had had a colonoscopy that was negative about 4 years prior.     - She had EGD and colonoscopy with Dr Salazar on 10/12/2021. Biopsy of the ascending colon came back showing infiltrating well-differentiated adenocarcinoma, along with polyp in rectum..   - She had PET scans and is scheduled for MRI liver on 11/15/2021.   - She is planning to have a repeat scope with Dr Morales with [facility] to address the rectal polyp.   - She has not seen Dr Harrison with colorectal  surgery as of yet.  - she is adamant that she is not interested in taking any chemotherapy  - will need MMR/MSI studies on the path  - discussed the latest NCCN guidelines (version 3.2021)       1/25/2022:  -  She last showed for an appointment on 11/5/2021. She was hospitalized at [facility] in Dec 2021.   - She is s/p laparoscopic Rght hemicolectomy with ileocolic anastomosis and excision of cyst from abdominal wall by Dr. Harrison on 12/20/2021. She had some post-operative ileus and fever post-op.  - She saw Dr Harrison for follow-up visit on 1/7/2022.  - T3 N0 with 44 Ln's all negative  - no tumor deposits; clear margins  - 3cm tumor size; grade 1 invasive adenocarcinoma  - MMR intact  - I see no indication for any adjuvant chemotherapy at this [facility]     (2) HTN and hypercholesterolemia     (3) COPD, asthma, bronchitis; former smoker     (4) Arthritis, primary OA and DJD      (5) Diverticulitis     (6) Hx/of skin cancer - followed by Dr Curran with dermatology      VISIT DIAGNOSES:      Cancer of ascending colon    Iron deficiency anemia due to chronic blood loss    Abnormal gastrointestinal PET scan    S/P right colectomy    Malignant neoplasm of ascending colon  -     Ambulatory referral/consult to Hematology / Oncology          PLAN:  1. F/u with PCP; she needs to get tested for covid  2. Call in antibiotic as precaution  3. F/u with GI for continued surveillance  4. Patient to call us with the covid results  RTC in 3 months  Fax note to Aniceto Harrison MD, Crow Bustos MD, Marie    Discussion:     COVID-19 Discussion:    I had long discussion with patient and any applicable family about the COVID-19 coronavirus epidemic and the recommended precautions with regard to cancer and/or hematology patients. I have re-iterated the CDC recommendations for adequate hand washing, use of hand -like products, and coughing into elbow, etc. In addition, especially for our patients who are on chemotherapy  and/or our otherwise immunocompromised patients, I have recommended avoidance of crowds, including movie theaters, restaurants, churches, etc. I have recommended avoidance of any sick or symptomatic family members and/or friends. I have also recommended avoidance of any raw and unwashed food products, and general avoidance of food items that have not been prepared by themselves. The patient has been asked to call us immediately with any symptom developments, issues, questions or other general concerns.     I spent over 25 mins of time with the patient. Reviewed results of the recently ordered labs, tests and studies; made directives with regards to the results. Over half of this time was spent couseling and coordinating care.    I have explained all of the above in detail and the patient understands all of the current recommendation(s). I have answered all of their questions to the best of my ability and to their complete satisfaction.   The patient is to continue with the current management plan.            Electronically signed by John Paul Warren MD

## 2022-01-25 ENCOUNTER — OFFICE VISIT (OUTPATIENT)
Dept: HEMATOLOGY/ONCOLOGY | Facility: CLINIC | Age: 82
End: 2022-01-25
Payer: MEDICARE

## 2022-01-25 VITALS
HEART RATE: 101 BPM | HEIGHT: 59 IN | TEMPERATURE: 100 F | BODY MASS INDEX: 23.79 KG/M2 | RESPIRATION RATE: 20 BRPM | DIASTOLIC BLOOD PRESSURE: 75 MMHG | WEIGHT: 118 LBS | SYSTOLIC BLOOD PRESSURE: 121 MMHG

## 2022-01-25 DIAGNOSIS — C18.2 MALIGNANT NEOPLASM OF ASCENDING COLON: ICD-10-CM

## 2022-01-25 DIAGNOSIS — C18.2 CANCER OF ASCENDING COLON: Primary | ICD-10-CM

## 2022-01-25 DIAGNOSIS — D50.0 IRON DEFICIENCY ANEMIA DUE TO CHRONIC BLOOD LOSS: ICD-10-CM

## 2022-01-25 DIAGNOSIS — R09.89 CHEST CONGESTION: Primary | ICD-10-CM

## 2022-01-25 DIAGNOSIS — R94.8 ABNORMAL GASTROINTESTINAL PET SCAN: ICD-10-CM

## 2022-01-25 DIAGNOSIS — Z90.49 S/P RIGHT COLECTOMY: ICD-10-CM

## 2022-01-25 PROCEDURE — 3074F PR MOST RECENT SYSTOLIC BLOOD PRESSURE < 130 MM HG: ICD-10-PCS | Mod: S$GLB,,, | Performed by: INTERNAL MEDICINE

## 2022-01-25 PROCEDURE — 1160F PR REVIEW ALL MEDS BY PRESCRIBER/CLIN PHARMACIST DOCUMENTED: ICD-10-PCS | Mod: S$GLB,,, | Performed by: INTERNAL MEDICINE

## 2022-01-25 PROCEDURE — 3078F PR MOST RECENT DIASTOLIC BLOOD PRESSURE < 80 MM HG: ICD-10-PCS | Mod: S$GLB,,, | Performed by: INTERNAL MEDICINE

## 2022-01-25 PROCEDURE — 1160F RVW MEDS BY RX/DR IN RCRD: CPT | Mod: S$GLB,,, | Performed by: INTERNAL MEDICINE

## 2022-01-25 PROCEDURE — 3288F FALL RISK ASSESSMENT DOCD: CPT | Mod: S$GLB,,, | Performed by: INTERNAL MEDICINE

## 2022-01-25 PROCEDURE — 1101F PT FALLS ASSESS-DOCD LE1/YR: CPT | Mod: S$GLB,,, | Performed by: INTERNAL MEDICINE

## 2022-01-25 PROCEDURE — 99215 PR OFFICE/OUTPT VISIT, EST, LEVL V, 40-54 MIN: ICD-10-PCS | Mod: S$GLB,,, | Performed by: INTERNAL MEDICINE

## 2022-01-25 PROCEDURE — 1159F PR MEDICATION LIST DOCUMENTED IN MEDICAL RECORD: ICD-10-PCS | Mod: S$GLB,,, | Performed by: INTERNAL MEDICINE

## 2022-01-25 PROCEDURE — 3074F SYST BP LT 130 MM HG: CPT | Mod: S$GLB,,, | Performed by: INTERNAL MEDICINE

## 2022-01-25 PROCEDURE — 1125F AMNT PAIN NOTED PAIN PRSNT: CPT | Mod: S$GLB,,, | Performed by: INTERNAL MEDICINE

## 2022-01-25 PROCEDURE — 1159F MED LIST DOCD IN RCRD: CPT | Mod: S$GLB,,, | Performed by: INTERNAL MEDICINE

## 2022-01-25 PROCEDURE — 3078F DIAST BP <80 MM HG: CPT | Mod: S$GLB,,, | Performed by: INTERNAL MEDICINE

## 2022-01-25 PROCEDURE — 1125F PR PAIN SEVERITY QUANTIFIED, PAIN PRESENT: ICD-10-PCS | Mod: S$GLB,,, | Performed by: INTERNAL MEDICINE

## 2022-01-25 PROCEDURE — 3288F PR FALLS RISK ASSESSMENT DOCUMENTED: ICD-10-PCS | Mod: S$GLB,,, | Performed by: INTERNAL MEDICINE

## 2022-01-25 PROCEDURE — 99215 OFFICE O/P EST HI 40 MIN: CPT | Mod: S$GLB,,, | Performed by: INTERNAL MEDICINE

## 2022-01-25 PROCEDURE — 1101F PR PT FALLS ASSESS DOC 0-1 FALLS W/OUT INJ PAST YR: ICD-10-PCS | Mod: S$GLB,,, | Performed by: INTERNAL MEDICINE

## 2022-01-25 RX ORDER — AMOXICILLIN 250 MG/1
250 CAPSULE ORAL 3 TIMES DAILY
Qty: 21 CAPSULE | Refills: 0 | Status: SHIPPED | OUTPATIENT
Start: 2022-01-25 | End: 2022-02-01

## 2022-01-31 ENCOUNTER — TELEPHONE (OUTPATIENT)
Dept: HEMATOLOGY/ONCOLOGY | Facility: CLINIC | Age: 82
End: 2022-01-31
Payer: MEDICARE

## 2022-01-31 NOTE — TELEPHONE ENCOUNTER
Spoke to daughter and made aware that handicapped sticker paperwork is ready for pickup. Paperwork placed at .

## 2022-03-04 ENCOUNTER — EXTERNAL HOME HEALTH (OUTPATIENT)
Dept: HOME HEALTH SERVICES | Facility: HOSPITAL | Age: 82
End: 2022-03-04
Payer: MEDICARE

## 2022-04-29 ENCOUNTER — HOSPITAL ENCOUNTER (OUTPATIENT)
Dept: PREADMISSION TESTING | Facility: HOSPITAL | Age: 82
Discharge: HOME | End: 2022-04-29
Attending: INTERNAL MEDICINE
Payer: MEDICARE

## 2022-04-29 VITALS
SYSTOLIC BLOOD PRESSURE: 140 MMHG | OXYGEN SATURATION: 96 % | RESPIRATION RATE: 18 BRPM | WEIGHT: 117.94 LBS | BODY MASS INDEX: 23.78 KG/M2 | HEIGHT: 59 IN | TEMPERATURE: 99 F | DIASTOLIC BLOOD PRESSURE: 80 MMHG | HEART RATE: 86 BPM

## 2022-04-29 DIAGNOSIS — Z01.818 PRE-OP TESTING: Primary | ICD-10-CM

## 2022-04-29 NOTE — PRE-PROCEDURE INSTRUCTIONS
Pre op instructions; states has colon prep info and understands; questions answered; npo after midnight; reviewed history and meds; verbalized understanding; will not take any meds am of procedure

## 2022-04-29 NOTE — DISCHARGE INSTRUCTIONS
To confirm, Your doctor has instructed you that procedure is scheduled for: May 3     1 Person can come with you the day of surgery     Endoscopy nurse will call the afternoon prior to surgery between 4:00 and 6:00 PM with the final arrival time.      Please  Enter at Garage Parking and come through front entrance.  Do not park in [facility] parking as you will have to walk to Registration .   Check in at registration.   After registration, proceed past gift shop and through glass door ( [facility] [place]) Check in at the nurses station to the left.   Do not eat or drink anything after midnight the night before your surgery - THIS INCLUDES  WATER, GUM, MINTS AND CANDY.  YOU MAY BRUSH YOUR TEETH BUT DO NOT SWALLOW     TAKE ONLY THESE MEDICATIONS WITH A SMALL SIP OF WATER THE MORNING OF YOUR PROCEDURE:  NONE    FOLLOW COLON PREP       PLEASE NOTE:  The surgery schedule has many variables which may affect the time of your surgery case.  Family members should be available if your surgery time changes.  Plan to be here the day of your procedure between 4-6 hours.      DO NOT TAKE THESE MEDICATIONS 5-7 DAYS PRIOR to your procedure or per your surgeon's request: ASPIRIN, ALEVE, ADVIL, IBUPROFEN,  ENRIQUE SELTZER, BC , FISH OIL , VITAMIN E, HERBALS  (May take Tylenol)          IMPORTANT INSTRUCTIONS      Do not smoke, vape or drink alcoholic beverages 24 hours prior to your procedure.  Shower the night before  and sleep in a bed with clean sheets.  Do not sleep with a pet in the bed.     If you wear contact lenses, dentures, hearing aids or glasses, bring a container to put them in during surgery and give to a family member for safe keeping.    Please leave all jewelry, piercing's and valuables at home.   Wear rubber soled shoes (no flip flops).  ONLY for patients requiring bowel prep, written instructions will be given by your doctor's office.  If your doctor has scheduled you for an overnight stay, bring a small  overnight bag with any personal items you need.    Make arrangements in advance for transportation home by a responsible adult.      You must make arrangements for transportation, TAXI'S, UBER'S OR LYFTS ARE NOT ALLOWED.        If you have any questions about these instructions, call (Monday - Friday)  Endoscopy [phone]

## 2022-05-03 ENCOUNTER — ANESTHESIA EVENT (OUTPATIENT)
Dept: SURGERY | Facility: HOSPITAL | Age: 82
End: 2022-05-03
Payer: MEDICARE

## 2022-05-03 ENCOUNTER — ANESTHESIA (OUTPATIENT)
Dept: SURGERY | Facility: HOSPITAL | Age: 82
End: 2022-05-03
Payer: MEDICARE

## 2022-05-03 ENCOUNTER — HOSPITAL ENCOUNTER (OUTPATIENT)
Facility: HOSPITAL | Age: 82
Discharge: HOME | End: 2022-05-03
Attending: INTERNAL MEDICINE | Admitting: INTERNAL MEDICINE
Payer: MEDICARE

## 2022-05-03 VITALS
OXYGEN SATURATION: 100 % | SYSTOLIC BLOOD PRESSURE: 116 MMHG | HEART RATE: 56 BPM | DIASTOLIC BLOOD PRESSURE: 58 MMHG | RESPIRATION RATE: 18 BRPM

## 2022-05-03 VITALS
WEIGHT: 117.25 LBS | SYSTOLIC BLOOD PRESSURE: 120 MMHG | BODY MASS INDEX: 23.64 KG/M2 | HEIGHT: 59 IN | HEART RATE: 50 BPM | OXYGEN SATURATION: 98 % | RESPIRATION RATE: 18 BRPM | DIASTOLIC BLOOD PRESSURE: 65 MMHG | TEMPERATURE: 98 F

## 2022-05-03 DIAGNOSIS — Z12.11 SCREENING FOR COLON CANCER: ICD-10-CM

## 2022-05-03 PROCEDURE — 88305 TISSUE EXAM BY PATHOLOGIST: CPT | Mod: TC

## 2022-05-03 PROCEDURE — 63600175 PHARM REV CODE 636 W HCPCS: Performed by: NURSE ANESTHETIST, CERTIFIED REGISTERED

## 2022-05-03 PROCEDURE — 37000008 HC ANESTHESIA 1ST 15 MINUTES: Performed by: INTERNAL MEDICINE

## 2022-05-03 PROCEDURE — 25000003 PHARM REV CODE 250: Performed by: NURSE ANESTHETIST, CERTIFIED REGISTERED

## 2022-05-03 PROCEDURE — 27000284 HC CANNULA NASAL: Performed by: STUDENT IN AN ORGANIZED HEALTH CARE EDUCATION/TRAINING PROGRAM

## 2022-05-03 PROCEDURE — 27201114 HC TRAP (ANY): Performed by: INTERNAL MEDICINE

## 2022-05-03 PROCEDURE — 37000009 HC ANESTHESIA EA ADD 15 MINS: Performed by: INTERNAL MEDICINE

## 2022-05-03 PROCEDURE — 27201089 HC SNARE, DISP (ANY): Performed by: INTERNAL MEDICINE

## 2022-05-03 PROCEDURE — 45385 COLONOSCOPY W/LESION REMOVAL: CPT | Performed by: INTERNAL MEDICINE

## 2022-05-03 RX ORDER — PROPOFOL 10 MG/ML
VIAL (ML) INTRAVENOUS
Status: DISCONTINUED | OUTPATIENT
Start: 2022-05-03 | End: 2022-05-03

## 2022-05-03 RX ADMIN — PROPOFOL 20 MG: 10 INJECTION, EMULSION INTRAVENOUS at 09:05

## 2022-05-03 RX ADMIN — SODIUM CHLORIDE: 0.9 INJECTION, SOLUTION INTRAVENOUS at 09:05

## 2022-05-03 RX ADMIN — PROPOFOL 40 MG: 10 INJECTION, EMULSION INTRAVENOUS at 09:05

## 2022-05-03 NOTE — TRANSFER OF CARE
"Anesthesia Transfer of Care Note    Patient: Teressa Benson    Procedure(s) Performed: Procedure(s) (LRB):  COLONOSCOPY (N/A)    Patient location: GI    Anesthesia Type: MAC          Last vitals:   Visit Vitals  BP (!) 144/63   Pulse (!) 57   Temp 36.6 °C (97.8 °F) (Skin)   Resp 20   Ht 4' 11" (1.499 m)   Wt 53.2 kg (117 lb 3.9 oz)   SpO2 97%   Breastfeeding No   BMI 23.68 kg/m²     "

## 2022-05-03 NOTE — DISCHARGE INSTRUCTIONS
No driving for 24 hours  No alcohol for 24 hours  Do not make any critical decisions or sign legal documents until tomorrow.  Avoid gassy foods today (broccoli, beans, or cabbage.)  Doctor Andrew will call you with results of pathology in about 1 week.     No NSAIDS for 2 weeks. (No aspirin, advil, aleve or ibuprofen) Use tylenol only during this time.

## 2022-05-03 NOTE — H&P
GASTROENTEROLOGY PRE-PROCEDURE H&P NOTE  Patient Name: Teressa Benson  Patient MRN: [id number]  Patient : 1940    Service date: 5/3/2022    PCP: Crow Bustos MD    No chief complaint on file.      HPI: Patient is a 82 y.o. female with PMHx as below here for evaluation of colon cancer s/p R colon resection and f/u large rectal EMR.     Past Medical History:  Past Medical History:   Diagnosis Date    Abnormal gastrointestinal PET scan 2021    Allergy     Arthritis     Asthma     pt denies    Cancer of ascending colon 2021    Cataract     COPD (chronic obstructive pulmonary disease)     Diverticulitis     DJD (degenerative joint disease)     Full dentures     [place] (hard of hearing)     BILAT AIDS    Hypertension     IGT (impaired glucose tolerance)     Iron deficiency anemia due to chronic blood loss 2021    Liver nodule 2021    Other and unspecified hyperlipidemia     Rectal cancer 2021    S/P right colectomy 2022    Squamous cell carcinoma of skin 2020    left inner thigh    Urinary tract infection         Past Surgical History:  Past Surgical History:   Procedure Laterality Date    APPENDECTOMY      CHOLECYSTECTOMY      COLON SURGERY  2021    COLONOSCOPY N/A 2021    Procedure: COLONOSCOPY WITH ENDOSCOPIC MUCOSAL RESECTION;  Surgeon: Raul Morales III, MD;  Location: [facility];  Service: Endoscopy;  Laterality: N/A;    COLONOSCOPY W/ POLYPECTOMY  2021    ENDOSCOPIC ULTRASOUND OF UPPER GASTROINTESTINAL TRACT  2021    ENDOSCOPIC ULTRASOUND OF UPPER GASTROINTESTINAL TRACT N/A 2021    Procedure: ULTRASOUND, UPPER GI TRACT, ENDOSCOPIC;  Surgeon: Raul Morales III, MD;  Location: [facility];  Service: Endoscopy;  Laterality: N/A;    EYE SURGERY      cataract    HYSTERECTOMY      TONSILLECTOMY, ADENOIDECTOMY          Home Medications:  Medications Prior to Admission   Medication Sig Dispense Refill Last Dose     albuterol (PROVENTIL) 2.5 mg /3 mL (0.083 %) nebulizer solution Take 2.5 mg by nebulization every 6 (six) hours as needed for Wheezing. Rescue   USES TID   5/2/2022 at Unknown time    enalapril (VASOTEC) 10 MG tablet TAKE ONE TABLET BY MOUTH ONCE DAILY (Patient taking differently: Take 10 mg by mouth once daily.) 90 tablet 2 5/2/2022 at Unknown time    montelukast (SINGULAIR) 10 mg tablet TAKE ONE TABLET BY MOUTH ONCE DAILY (Patient taking differently: Take 10 mg by mouth every evening.) 90 tablet 2 5/2/2022 at Unknown time    pantoprazole (PROTONIX) 40 MG tablet Take 1 tablet (40 mg total) by mouth once daily. 30 tablet 3 5/2/2022 at Unknown time    simvastatin (ZOCOR) 20 MG tablet TAKE ONE TABLET BY MOUTH EVERY EVENING. (Patient taking differently: 20 mg every evening.) 90 tablet 1 5/2/2022 at Unknown time    ondansetron (ZOFRAN) 8 MG tablet Take 1 tablet (8 mg total) by mouth every 12 (twelve) hours as needed for Nausea. 8 tablet 1 Unknown at Unknown time       Inpatient Medications:        Review of patient's allergies indicates:   Allergen Reactions    Demerol [meperidine] Other (See Comments)     ams    Phenergan [promethazine] Other (See Comments)     ams       Social History:   Social History     Occupational History    Not on file   Tobacco Use    Smoking status: Former Smoker     Types: Cigarettes    Smokeless tobacco: Never Used   Substance and Sexual Activity    Alcohol use: No    Drug use: No    Sexual activity: Not on file       Family History:   Family History   Problem Relation Age of Onset    Asthma Mother     Heart failure Father        Review of Systems:  A 10 point review of systems was performed and was normal, except as mentioned in the HPI, including constitutional, HEENT, heme, lymph, cardiovascular, respiratory, gastrointestinal, genitourinary, neurologic, endocrine, psychiatric and musculoskeletal.      OBJECTIVE:    Physical Exam:  24 Hour Vital Sign Ranges: Temp:  [97.8 °F  "(36.6 °C)] 97.8 °F (36.6 °C)  Pulse:  [57] 57  Resp:  [20] 20  SpO2:  [97 %] 97 %  BP: (144)/(63) 144/63  Most recent vitals: BP (!) 144/63   Pulse (!) 57   Temp 97.8 °F (36.6 °C) (Skin)   Resp 20   Ht 4' 11" (1.499 m)   Wt 53.2 kg (117 lb 3.9 oz)   SpO2 97%   Breastfeeding No   BMI 23.68 kg/m²    GEN: well-developed, well-nourished, awake and alert, non-toxic appearing adult  HEENT: PERRL, sclera anicteric, oral mucosa pink and moist without lesion  NECK: trachea midline; Good ROM  CV: regular rate and rhythm, no murmurs or gallops  RESP: clear to auscultation bilaterally, no wheezes, rhonci or rales  ABD: soft, non-tender, non-distended, normal bowel sounds  EXT: no swelling or edema, 2+ pulses distally  SKIN: no rashes or jaundice  PSYCH: normal affect    Labs:   No results for input(s): WBC, MCV, PLT in the last 72 hours.    Invalid input(s): HGBAU  No results for input(s): NA, K, CL, CO2, BUN, GLU in the last 72 hours.    Invalid input(s): CREA  No results for input(s): ALB in the last 72 hours.    Invalid input(s): ALKP, SGOT, SGPT, TBIL, DBIL, TPRO  No results for input(s): PT, INR, PTT in the last 72 hours.      IMPRESSION / RECOMMENDATIONS:  Colonoscopy w/ interventions  RIsks, benefits, alternatives discussed in detail regarding upcoming procedures and sedation. Some of the more common endoscopic complications include but not limited to immediate or delayed perforation, bleeding, infections, pain, inadvertent injury to surrounding tissue / organs and possible need for surgical evaluation. Patient expressed understanding, all questions answered and will proceed with procedure as planned.     Raul Morales III  5/3/2022  9:20 AM      "

## 2022-05-03 NOTE — ANESTHESIA POSTPROCEDURE EVALUATION
Anesthesia Post Evaluation    Patient: Teressa Benson    Procedure(s) Performed: Procedure(s) (LRB):  COLONOSCOPY (N/A)    Final Anesthesia Type: MAC      Patient location during evaluation: GI PACU  Patient participation: Yes- Able to Participate  Level of consciousness: awake and alert  Post-procedure vital signs: reviewed and stable  Pain management: adequate  Airway patency: patent    PONV status at discharge: No PONV  Anesthetic complications: no      Cardiovascular status: hemodynamically stable  Respiratory status: unassisted, spontaneous ventilation and room air  Hydration status: euvolemic  Follow-up not needed.          Vitals Value Taken Time   /65 05/03/22 1030   Temp 36.8 °C (98.2 °F) 05/03/22 0950   Pulse 50 05/03/22 1030   Resp 18 05/03/22 1030   SpO2 98 % 05/03/22 1030         Event Time   Out of Recovery 10:34:54         Pain/Stoney Score: No data recorded

## 2022-05-03 NOTE — ANESTHESIA PREPROCEDURE EVALUATION
05/03/2022  Teressa Benson is a 82 y.o., female.    Patient Active Problem List   Diagnosis    Urgency of micturation    DJD (degenerative joint disease)    Arthritis of right knee    COPD (chronic obstructive pulmonary disease)    Hypertension    Bronchitis    Overweight (BMI 25.0-29.9)    Mixed hyperlipidemia    Primary osteoarthritis of both knees    Cancer of ascending colon    Abnormal gastrointestinal PET scan    Liver nodule    Iron deficiency anemia due to chronic blood loss    Fever    S/P right colectomy       Past Surgical History:   Procedure Laterality Date    APPENDECTOMY      CHOLECYSTECTOMY      COLON SURGERY  12/2021    COLONOSCOPY N/A 11/30/2021    Procedure: COLONOSCOPY WITH ENDOSCOPIC MUCOSAL RESECTION;  Surgeon: Raul Morales III, MD;  Location[facility] END[facility];  Service: Endoscopy;  Laterality: N/A;    COLONOSCOPY W/ POLYPECTOMY  11/30/2021    ENDOSCOPIC ULTRASOUND OF UPPER GASTROINTESTINAL TRACT  11/30/2021    ENDOSCOPIC ULTRASOUND OF UPPER GASTROINTESTINAL TRACT N/A 11/30/2021    Procedure: ULTRASOUND, UPPER GI TRACT, ENDOSCOPIC;  Surgeon: Raul Morales III, MD;  Location: [facility] END[facility];  Service: Endoscopy;  Laterality: N/A;    EYE SURGERY      cataract    HYSTERECTOMY      TONSILLECTOMY, ADENOIDECTOMY          Tobacco Use:  The patient  reports that she has quit smoking. Her smoking use included cigarettes. She has never used smokeless tobacco.     Results for orders placed or performed during the hospital encounter of 12/25/21   EKG 12-lead    Collection Time: 12/25/21  8:36 AM    Narrative    Test Reason : R00.0,    Vent. Rate : 083 BPM     Atrial Rate : 083 BPM     P-R Int : 174 ms          QRS Dur : 082 ms      QT Int : 390 ms       P-R-T Axes : 080 -03 028 degrees     QTc Int : 458 ms    Sinus rhythm with Premature atrial complexes  Minimal voltage  criteria for LVH, may be normal variant  Abnormal ECG  When compared with ECG of 26-NOV-2021 10:33,  No significant change was found  Confirmed by Sheri STARR, Leif PAEZ ([id number]) on 12/29/2021 3:37:51 PM    Referred By: LYUBOV   SELF           Confirmed By:Leif Wade MD             Lab Results   Component Value Date    WBC 6.93 04/29/2022    HGB 12.8 04/29/2022    HCT 40.8 04/29/2022    MCV 85 04/29/2022     04/29/2022     BMP  Lab Results   Component Value Date     (L) 04/29/2022    K 4.1 04/29/2022    CL 98 04/29/2022    CO2 23 04/29/2022    BUN 11 04/29/2022    CREATININE 0.6 04/29/2022    CALCIUM 9.5 04/29/2022    ANIONGAP 14 04/29/2022    GLU 98 04/29/2022     12/25/2021     12/24/2021       No results found for this or any previous visit.        Pre-op Assessment    I have reviewed the Patient Summary Reports.    I have reviewed the Nursing Notes. I have reviewed the NPO Status.   I have reviewed the Medications.     Review of Systems  Anesthesia Hx:  No problems with previous Anesthesia Denies Hx of Anesthetic complications  Denies Family Hx of Anesthesia complications.   Denies Personal Hx of Anesthesia complications.   Social:  Former Smoker, No Alcohol Use    Hematology/Oncology:         -- Anemia: --  Cancer in past history:  surgery  Oncology Comments: History of rectal cancer  History of colon cancer  Squamous cell carcinoma of skin     EENT/Dental:  EENT/Dental Normal Eyes: Visual Impairment Has Bilateral and S/P Extraction - Bilateral Catarract    Cardiovascular:   Hypertension, poorly controlled hyperlipidemia ECG has been reviewed.    Pulmonary:   COPD, moderate Asthma moderate Patient with daily maintenance inhaler therapy  Patient without home oxygen therapy  Patient with no pulmonologist at this time   Renal/:  Renal/ Normal     Hepatic/GI:   Bowel Prep. Liver nodule  No active nausea vomiting at this time   Musculoskeletal:   Arthritis  DJD  (degenerative joint disease)   Neurological:  Neurology Normal    Endocrine:   Patient diagnosed with prediabetes with no medicines at this time   Dermatological:  Skin Normal    Psych:  Psychiatric Normal           Physical Exam  General:  Well nourished      Airway/Jaw/Neck:  Airway Findings: Mouth Opening: Normal   Tongue: Normal   General Airway Assessment: Adult Mallampati: III  TM Distance: 4 - 6 cm   Jaw/Neck Findings:  Neck ROM: Normal ROM       Dental:  Dental Findings: Upper Dentures, Lower Dentures     Chest/Lungs:  Chest/Lungs Findings: Clear to auscultation, Normal Respiratory Rate      Heart/Vascular:  Heart Findings: Rate: Normal  Rhythm: Regular Rhythm  Sounds: Normal        Mental Status:  Mental Status Findings:  Cooperative, Alert and Oriented         Anesthesia Plan  Type of Anesthesia, risks & benefits discussed:  Anesthesia Type:  MAC    Patient's Preference:   Plan Factors:          Intra-op Monitoring Plan: standard ASA monitors  Intra-op Monitoring Plan Comments:   Post Op Pain Control Plan: per primary service following discharge from PACU  Post Op Pain Control Plan Comments:     Induction:   IV  Beta Blocker:  Patient is not currently on a Beta-Blocker (No further documentation required).       Informed Consent: Informed consent signed with the Patient and all parties understand the risks and agree with anesthesia plan.  All questions answered.  Anesthesia consent signed with patient.  ASA Score: 3     Day of Surgery Review of History & Physical:              Ready For Surgery From Anesthesia Perspective.           Physical Exam  General: Well nourished    Airway:  Mallampati: III   Mouth Opening: Normal  TM Distance: 4 - 6 cm  Tongue: Normal  Neck ROM: Normal ROM    Dental:  Upper Dentures, Lower Dentures    Chest/Lungs:  Clear to auscultation, Normal Respiratory Rate    Heart:  Rate: Normal  Rhythm: Regular Rhythm  Sounds: Normal          Anesthesia Plan  Type of Anesthesia, risks &  benefits discussed:    Anesthesia Type: MAC  Intra-op Monitoring Plan: standard ASA monitors  Post Op Pain Control Plan: per primary service following discharge from PACU  Induction:  IV  Informed Consent: Informed consent signed with the Patient and all parties understand the risks and agree with anesthesia plan.  All questions answered.   ASA Score: 3    Ready For Surgery From Anesthesia Perspective.       .

## 2022-05-03 NOTE — [employer] PATIENT INSTRUCTIONS
Discharge Summary/Instructions after an Endoscopic Procedure  Patient Name: Teressa Benson  Patient MRN: [id number]  Patient YOB: 1940  Tuesday, May 3, 2022  Raul Morales III, MD  RESTRICTIONS:  During your procedure today, you received medications for sedation.  These   medications may affect your judgment, balance and coordination.  Therefore,   for 24 hours, you have the following restrictions:   - DO NOT drive a car, operate machinery, make legal/financial decisions,   sign important papers or drink alcohol.    ACTIVITY:  Today: no heavy lifting, straining or running due to procedural   sedation/anesthesia.  The following day: return to full activity including work.  DIET:  Eat and drink normally unless instructed otherwise.     TREATMENT FOR COMMON SIDE EFFECTS:  - Mild abdominal pain, nausea, belching, bloating or excessive gas:  rest,   eat lightly and use a heating pad.  - Sore Throat: treat with throat lozenges and/or gargle with warm salt   water.  - Because air was used during the procedure, expelling large amounts of air   from your rectum or belching is normal.  - If a bowel prep was taken, you may not have a bowel movement for 1-3 days.    This is normal.  SYMPTOMS TO WATCH FOR AND REPORT TO YOUR PHYSICIAN:  1. Abdominal pain or bloating, other than gas cramps.  2. Chest pain.  3. Back pain.  4. Signs of infection such as: chills or fever occurring within 24 hours   after the procedure.  5. Rectal bleeding, which would show as bright red, maroon, or black stools.   (A tablespoon of blood from the rectum is not serious, especially if   hemorrhoids are present.)  6. Vomiting.  7. Weakness or dizziness.  GO DIRECTLY TO THE NEAREST EMERGENCY ROOM IF YOU HAVE ANY OF THE FOLLOWING:      Difficulty breathing              Chills and/or fever over 101 F   Persistent vomiting and/or vomiting blood   Severe abdominal pain   Severe chest pain   Black, tarry stools   Bleeding- more than one  tablespoon   Any other symptom or condition that you feel may need urgent attention  Your doctor recommends these additional instructions:  If any biopsies were taken, your doctors clinic will contact you in 1 to 2   weeks with any results.  - Patient has a contact number available for emergencies.  The signs and   symptoms of potential delayed complications were discussed with the   patient.  Return to normal activities tomorrow.  Written discharge   instructions were provided to the patient.   - Resume previous diet.   - Discharge patient to home (ambulatory).   - Continue present medications.   - Return to GI clinic PRN.   - No further colonoscopies due to age and lack of findings  - No repeat colonoscopy due to no evidence of mucosal or other abnormalities   on today's exam.  For questions, problems or results please call your physician - Raul Morales III, MD at Work:  [phone].  [facility], EMERGENCY ROOM PHONE NUMBER: [phone]  IF A COMPLICATION OR EMERGENCY SITUATION ARISES AND YOU ARE UNABLE TO REACH   YOUR PHYSICIAN - GO DIRECTLY TO THE EMERGENCY ROOM.  Raul Morales III, MD  5/3/2022 9:43:22 AM  This report has been verified and signed electronically.  Dear patient,  As a result of recent federal legislation (The Federal Cures Act), you may   receive lab or pathology results from your procedure in your [facility]   account before your physician is able to contact you. Your physician or   their representative will relay the results to you with their   recommendations at their soonest availability.  Thank you,  [employer]

## 2023-10-16 ENCOUNTER — TELEPHONE (OUTPATIENT)
Dept: FAMILY MEDICINE | Facility: CLINIC | Age: 83
End: 2023-10-16

## 2023-10-16 NOTE — TELEPHONE ENCOUNTER
----- Message from Danita Madrid sent at 10/16/2023  9:36 AM CDT -----  - 9:35-pt would like a call back about her cholesterol medication   [phone]

## 2023-10-16 NOTE — TELEPHONE ENCOUNTER
Spoke with patient who states she is going ot be seeing Dr. Owens as a new patient in November, she states she is out of refills of her medications and she needs it refills. States she does not have a cardiologist. She is rescheduled with Danita so she does not have to be without the medication.

## 2023-10-31 ENCOUNTER — OFFICE VISIT (OUTPATIENT)
Dept: FAMILY MEDICINE | Facility: CLINIC | Age: 83
End: 2023-10-31
Attending: FAMILY MEDICINE
Payer: MEDICARE

## 2023-10-31 ENCOUNTER — TELEPHONE (OUTPATIENT)
Dept: FAMILY MEDICINE | Facility: CLINIC | Age: 83
End: 2023-10-31

## 2023-10-31 VITALS
BODY MASS INDEX: 28.18 KG/M2 | OXYGEN SATURATION: 96 % | HEIGHT: 57 IN | DIASTOLIC BLOOD PRESSURE: 80 MMHG | HEART RATE: 84 BPM | SYSTOLIC BLOOD PRESSURE: 118 MMHG | WEIGHT: 130.63 LBS

## 2023-10-31 DIAGNOSIS — R79.9 ABNORMAL FINDING OF BLOOD CHEMISTRY, UNSPECIFIED: ICD-10-CM

## 2023-10-31 DIAGNOSIS — E78.2 MIXED HYPERLIPIDEMIA: ICD-10-CM

## 2023-10-31 DIAGNOSIS — Z00.00 ENCOUNTER FOR MEDICAL EXAMINATION TO ESTABLISH CARE: Primary | ICD-10-CM

## 2023-10-31 DIAGNOSIS — J41.0 SIMPLE CHRONIC BRONCHITIS: ICD-10-CM

## 2023-10-31 DIAGNOSIS — I10 PRIMARY HYPERTENSION: ICD-10-CM

## 2023-10-31 DIAGNOSIS — Z23 FLU VACCINE NEED: ICD-10-CM

## 2023-10-31 PROBLEM — Z48.3 AFTERCARE FOLLOWING SURGERY FOR NEOPLASM: Status: ACTIVE | Noted: 2021-12-26

## 2023-10-31 PROBLEM — K76.0 NONALCOHOLIC FATTY LIVER DISEASE: Status: ACTIVE | Noted: 2023-10-31

## 2023-10-31 PROBLEM — J40 BRONCHITIS: Status: RESOLVED | Noted: 2018-03-16 | Resolved: 2023-10-31

## 2023-10-31 PROBLEM — Z91.81 HISTORY OF FALLING: Status: ACTIVE | Noted: 2021-12-26

## 2023-10-31 PROCEDURE — 3079F DIAST BP 80-89 MM HG: CPT | Mod: CPTII,S$GLB,,

## 2023-10-31 PROCEDURE — 3074F PR MOST RECENT SYSTOLIC BLOOD PRESSURE < 130 MM HG: ICD-10-PCS | Mod: CPTII,S$GLB,,

## 2023-10-31 PROCEDURE — 1159F MED LIST DOCD IN RCRD: CPT | Mod: CPTII,S$GLB,,

## 2023-10-31 PROCEDURE — 3074F SYST BP LT 130 MM HG: CPT | Mod: CPTII,S$GLB,,

## 2023-10-31 PROCEDURE — G0008 ADMIN INFLUENZA VIRUS VAC: HCPCS | Mod: S$GLB,,,

## 2023-10-31 PROCEDURE — 3288F PR FALLS RISK ASSESSMENT DOCUMENTED: ICD-10-PCS | Mod: CPTII,S$GLB,,

## 2023-10-31 PROCEDURE — G0008 FLU VACCINE - QUADRIVALENT - ADJUVANTED: ICD-10-PCS | Mod: S$GLB,,,

## 2023-10-31 PROCEDURE — 1159F PR MEDICATION LIST DOCUMENTED IN MEDICAL RECORD: ICD-10-PCS | Mod: CPTII,S$GLB,,

## 2023-10-31 PROCEDURE — 99204 OFFICE O/P NEW MOD 45 MIN: CPT | Mod: S$GLB,,,

## 2023-10-31 PROCEDURE — 90694 VACC AIIV4 NO PRSRV 0.5ML IM: CPT | Mod: S$GLB,,,

## 2023-10-31 PROCEDURE — 3079F PR MOST RECENT DIASTOLIC BLOOD PRESSURE 80-89 MM HG: ICD-10-PCS | Mod: CPTII,S$GLB,,

## 2023-10-31 PROCEDURE — 90694 FLU VACCINE - QUADRIVALENT - ADJUVANTED: ICD-10-PCS | Mod: S$GLB,,,

## 2023-10-31 PROCEDURE — 99204 PR OFFICE/OUTPT VISIT, NEW, LEVL IV, 45-59 MIN: ICD-10-PCS | Mod: S$GLB,,,

## 2023-10-31 PROCEDURE — 1101F PT FALLS ASSESS-DOCD LE1/YR: CPT | Mod: CPTII,S$GLB,,

## 2023-10-31 PROCEDURE — 1101F PR PT FALLS ASSESS DOC 0-1 FALLS W/OUT INJ PAST YR: ICD-10-PCS | Mod: CPTII,S$GLB,,

## 2023-10-31 PROCEDURE — 3288F FALL RISK ASSESSMENT DOCD: CPT | Mod: CPTII,S$GLB,,

## 2023-10-31 RX ORDER — MONTELUKAST SODIUM 10 MG/1
10 TABLET ORAL NIGHTLY
Qty: 90 TABLET | Refills: 1 | Status: SHIPPED | OUTPATIENT
Start: 2023-10-31 | End: 2024-01-22 | Stop reason: SDUPTHER

## 2023-10-31 RX ORDER — ALBUTEROL SULFATE 0.83 MG/ML
2.5 SOLUTION RESPIRATORY (INHALATION) EVERY 6 HOURS PRN
Qty: 360 ML | Refills: 11 | Status: SHIPPED | OUTPATIENT
Start: 2023-10-31 | End: 2024-10-30

## 2023-10-31 RX ORDER — SIMVASTATIN 20 MG/1
20 TABLET, FILM COATED ORAL NIGHTLY
Qty: 90 TABLET | Refills: 1 | Status: SHIPPED | OUTPATIENT
Start: 2023-10-31 | End: 2024-01-22 | Stop reason: SDUPTHER

## 2023-10-31 RX ORDER — ENALAPRIL MALEATE 10 MG/1
10 TABLET ORAL DAILY
Qty: 90 TABLET | Refills: 1 | Status: SHIPPED | OUTPATIENT
Start: 2023-10-31 | End: 2024-01-22 | Stop reason: SDUPTHER

## 2023-10-31 NOTE — TELEPHONE ENCOUNTER
----- Message from Danita Madrid sent at 10/31/2023 12:24 PM CDT -----  - 12:13-bright with emmanuel gonzáles is asking for a call back   [phone]

## 2023-10-31 NOTE — PROGRESS NOTES
SUBJECTIVE:    Patient ID: Teressa Benson is a 83 y.o. female.    Chief Complaint: Establish Care (Establishing care/ discuss medications/ discuss DEXA continuation/ requests flu vaccine//mp)    83-year-old female patient presents to clinic today to establish care.  Patient has no acute complaints or concerns today.    Past medical history, family history, surgical history, social history, current medications, allergies reviewed.    Patient spends her time living part-time with each 1 of her daughters who all live locally.  She is very happy with this arrangement.    She attends a local gym and participates in an exercise class for seniors.  She is socially active and an active member of her [employer].    She does need a few refills today and she is willing to get labs.    Shingles Vaccine(1 of 2) due on 05/19/2017-we discussed this vaccination in addition to the RSV vaccination and the COVID vaccine.  DEXA Scan due on 03/28/2020  Lipid Panel due on 06/29/2026  TETANUS VACCINE due on 02/22/2027         No visits with results within 6 Month(s) from this visit.   Latest known visit with results is:   Lab Visit on 04/29/2022   Component Date Value Ref Range Status    WBC 04/29/2022 6.93  3.90 - 12.70 K/uL Final    RBC 04/29/2022 4.81  4.00 - 5.40 M/uL Final    Hemoglobin 04/29/2022 12.8  12.0 - 16.0 g/dL Final    Hematocrit 04/29/2022 40.8  37.0 - 48.5 % Final    MCV 04/29/2022 85  82 - 98 fL Final    MCH 04/29/2022 26.6 (L)  27.0 - 31.0 pg Final    MCHC 04/29/2022 31.4 (L)  32.0 - 36.0 g/dL Final    RDW 04/29/2022 17.9 (H)  11.5 - 14.5 % Final    Platelets 04/29/2022 293  150 - 450 K/uL Final    MPV 04/29/2022 8.8 (L)  9.2 - 12.9 fL Final    Immature Granulocytes 04/29/2022 0.1  0.0 - 0.5 % Final    Gran # (ANC) 04/29/2022 3.7  1.8 - 7.7 K/uL Final    Immature Grans (Abs) 04/29/2022 0.01  0.00 - 0.04 K/uL Final    Lymph # 04/29/2022 2.2  1.0 - 4.8 K/uL Final    Mono # 04/29/2022 0.7  0.3 - 1.0 K/uL Final    Eos #  04/29/2022 0.2  0.0 - 0.5 K/uL Final    Baso # 04/29/2022 0.07  0.00 - 0.20 K/uL Final    nRBC 04/29/2022 0  0 /100 WBC Final    Gran % 04/29/2022 53.8  38.0 - 73.0 % Final    Lymph % 04/29/2022 32.2  18.0 - 48.0 % Final    Mono % 04/29/2022 10.4  4.0 - 15.0 % Final    Eosinophil % 04/29/2022 2.5  0.0 - 8.0 % Final    Basophil % 04/29/2022 1.0  0.0 - 1.9 % Final    Differential Method 04/29/2022 Automated   Final    Sodium 04/29/2022 135 (L)  136 - 145 mmol/L Final    Potassium 04/29/2022 4.1  3.5 - 5.1 mmol/L Final    Chloride 04/29/2022 98  95 - 110 mmol/L Final    CO2 04/29/2022 23  23 - 29 mmol/L Final    Glucose 04/29/2022 98  70 - 110 mg/dL Final    BUN 04/29/2022 11  8 - 23 mg/dL Final    Creatinine 04/29/2022 0.6  0.5 - 1.4 mg/dL Final    Calcium 04/29/2022 9.5  8.7 - 10.5 mg/dL Final    Anion Gap 04/29/2022 14  8 - 16 mmol/L Final    eGFR if African American 04/29/2022 >60.0  >60 mL/min/1.73 m^2 Final    eGFR if non African American 04/29/2022 >60.0  >60 mL/min/1.73 m^2 Final       Past Medical History:   Diagnosis Date    Abnormal gastrointestinal PET scan 11/4/2021    Allergy     Arthritis     Asthma     pt denies    Cancer of ascending colon 11/4/2021    Cataract     COPD (chronic obstructive pulmonary disease)     Diverticulitis     DJD (degenerative joint disease)     Full dentures     [place] (hard of hearing)     BILAT AIDS    Hypertension     IGT (impaired glucose tolerance)     Iron deficiency anemia due to chronic blood loss 11/4/2021    Liver nodule 11/4/2021    Other and unspecified hyperlipidemia     Rectal cancer 11/4/2021    S/P right colectomy 1/24/2022    Squamous cell carcinoma of skin 2020    left inner thigh    Urinary tract infection      Social History     Socioeconomic History    Marital status:     Number of children: 4   Tobacco Use    Smoking status: Former     Types: Cigarettes    Smokeless tobacco: Never   Substance and Sexual Activity    Alcohol use: Never    Drug use: Never     Sexual activity: Not Currently     Past Surgical History:   Procedure Laterality Date    APPENDECTOMY      CHOLECYSTECTOMY      COLON SURGERY  12/2021    COLONOSCOPY N/A 11/30/2021    Procedure: COLONOSCOPY WITH ENDOSCOPIC MUCOSAL RESECTION;  Surgeon: Raul Morales III, MD;  Location: [facility] ENDO;  Service: Endoscopy;  Laterality: N/A;    COLONOSCOPY N/A 5/3/2022    Procedure: COLONOSCOPY;  Surgeon: Raul Morales III, MD;  Location: [facility] ENDO;  Service: Endoscopy;  Laterality: N/A;    COLONOSCOPY W/ POLYPECTOMY  11/30/2021    ENDOSCOPIC ULTRASOUND OF UPPER GASTROINTESTINAL TRACT  11/30/2021    ENDOSCOPIC ULTRASOUND OF UPPER GASTROINTESTINAL TRACT N/A 11/30/2021    Procedure: ULTRASOUND, UPPER GI TRACT, ENDOSCOPIC;  Surgeon: Raul Morales III, MD;  Location: [facility] ENDO;  Service: Endoscopy;  Laterality: N/A;    EYE SURGERY      cataract    HYSTERECTOMY      TONSILLECTOMY, ADENOIDECTOMY       Family History   Problem Relation Age of Onset    Asthma Mother     Heart failure Father     Liver disease Sister     No Known Problems Daughter     No Known Problems Daughter     No Known Problems Daughter     No Known Problems Son     No Known Problems Maternal Aunt     No Known Problems Maternal Uncle     No Known Problems Paternal Aunt     No Known Problems Paternal Uncle     No Known Problems Maternal Grandmother     No Known Problems Maternal Grandfather     No Known Problems Paternal Grandmother     No Known Problems Paternal Grandfather        Review of patient's allergies indicates:   Allergen Reactions    Demerol [meperidine] Other (See Comments)     ams    Phenergan [promethazine] Other (See Comments)     ams       Current Outpatient Medications:     albuterol (PROVENTIL) 2.5 mg /3 mL (0.083 %) nebulizer solution, Take 3 mLs (2.5 mg total) by nebulization every 6 (six) hours as needed for Wheezing. Rescue   USES TID, Disp: 360 mL, Rfl: 11    enalapril (VASOTEC) 10 MG tablet, Take 1 tablet (10 mg  "total) by mouth once daily., Disp: 90 tablet, Rfl: 1    montelukast (SINGULAIR) 10 mg tablet, Take 1 tablet (10 mg total) by mouth every evening., Disp: 90 tablet, Rfl: 1    simvastatin (ZOCOR) 20 MG tablet, Take 1 tablet (20 mg total) by mouth every evening., Disp: 90 tablet, Rfl: 1    Review of Systems   Constitutional:  Negative for activity change, appetite change, chills, fatigue, fever and unexpected weight change.   HENT:  Positive for nasal congestion and postnasal drip. Negative for ear pain, rhinorrhea, sore throat and trouble swallowing.    Eyes:  Negative for discharge and visual disturbance.   Respiratory:  Positive for cough and shortness of breath. Negative for apnea and wheezing.    Cardiovascular:  Negative for chest pain, palpitations and leg swelling.   Gastrointestinal:  Negative for abdominal pain, blood in stool, constipation, diarrhea, nausea, vomiting and reflux.   Genitourinary:  Negative for bladder incontinence, dysuria, frequency and urgency.   Musculoskeletal:  Negative for arthralgias, gait problem, leg pain and myalgias.   Integumentary:  Negative for rash and mole/lesion.   Allergic/Immunologic: Positive for environmental allergies.   Neurological:  Negative for dizziness, tremors, weakness, light-headedness, numbness and headaches.   Hematological:  Does not bruise/bleed easily.   Psychiatric/Behavioral:  Negative for dysphoric mood, sleep disturbance and suicidal ideas. The patient is not nervous/anxious.            Objective:      Vitals:    10/31/23 1053   BP: 118/80   Pulse: 84   SpO2: 96%   Weight: 59.2 kg (130 lb 9.6 oz)   Height: 4' 8.69" (1.44 m)     Physical Exam  Vitals and nursing note reviewed.   Constitutional:       General: She is not in acute distress.     Appearance: Normal appearance. She is well-developed and normal weight. She is not ill-appearing.   HENT:      Head: Normocephalic and atraumatic.      Right Ear: Tympanic membrane, ear canal and external ear " normal.      Left Ear: Tympanic membrane, ear canal and external ear normal.      Nose: Nose normal. No congestion or rhinorrhea.      Mouth/Throat:      Mouth: Mucous membranes are moist.      Pharynx: Oropharynx is clear. No posterior oropharyngeal erythema.   Eyes:      General: No scleral icterus.     Pupils: Pupils are equal, round, and reactive to light.   Neck:      Thyroid: No thyromegaly.      Vascular: No carotid bruit.   Cardiovascular:      Rate and Rhythm: Normal rate and regular rhythm.      Pulses: Normal pulses.      Heart sounds: Normal heart sounds. No murmur heard.  Pulmonary:      Effort: Pulmonary effort is normal.      Breath sounds: Normal breath sounds. No wheezing or rales.   Abdominal:      General: Bowel sounds are normal. There is no distension.      Palpations: Abdomen is soft.      Tenderness: There is no abdominal tenderness.   Musculoskeletal:         General: Normal range of motion.      Cervical back: Normal range of motion and neck supple.      Lumbar back: Normal. No spasms.      Right lower leg: No edema.      Left lower leg: No edema.   Skin:     General: Skin is warm and dry.      Capillary Refill: Capillary refill takes 2 to 3 seconds.      Findings: No rash.   Neurological:      General: No focal deficit present.      Mental Status: She is alert and oriented to person, place, and time.      Cranial Nerves: No cranial nerve deficit.      Coordination: Coordination normal.   Psychiatric:         Mood and Affect: Mood normal.         Behavior: Behavior normal.         Thought Content: Thought content normal.         Judgment: Judgment normal.           Assessment:       1. Encounter for medical examination to establish care    2. Flu vaccine need    3. Simple chronic bronchitis    4. Primary hypertension    5. Mixed hyperlipidemia    6. Abnormal finding of blood chemistry, unspecified         Plan:       Encounter for medical examination to establish care    Flu vaccine  need  Patient would like influenza vaccination today.  -     Influenza - Quadrivalent (Adjuvanted)    Simple chronic bronchitis  Needs refills  -     albuterol (PROVENTIL) 2.5 mg /3 mL (0.083 %) nebulizer solution; Take 3 mLs (2.5 mg total) by nebulization every 6 (six) hours as needed for Wheezing. Rescue   USES TID  Dispense: 360 mL; Refill: 11  -     montelukast (SINGULAIR) 10 mg tablet; Take 1 tablet (10 mg total) by mouth every evening.  Dispense: 90 tablet; Refill: 1    Primary hypertension  Well-controlled.  Needs refills.  Labs for surveillance.  -     enalapril (VASOTEC) 10 MG tablet; Take 1 tablet (10 mg total) by mouth once daily.  Dispense: 90 tablet; Refill: 1  -     CBC Auto Differential; Future; Expected date: 10/31/2023  -     Comprehensive Metabolic Panel; Future; Expected date: 10/31/2023  -     Magnesium; Future; Expected date: 10/31/2023  -     Microalbumin/Creatinine Ratio, Urine; Future; Expected date: 10/31/2023  -     T4, Free; Future; Expected date: 10/31/2023  -     TSH; Future; Expected date: 10/31/2023    Mixed hyperlipidemia  Needs refills.  Well-controlled.  Labs for surveillance.  -     simvastatin (ZOCOR) 20 MG tablet; Take 1 tablet (20 mg total) by mouth every evening.  Dispense: 90 tablet; Refill: 1  -     Lipid Panel; Future; Expected date: 10/31/2023    Abnormal finding of blood chemistry, unspecified  -     Hemoglobin A1C; Future; Expected date: 10/31/2023      Follow up in about 3 months (around 1/31/2024).        11/1/2023 Danita Crews

## 2023-11-01 DIAGNOSIS — Z78.0 MENOPAUSE: ICD-10-CM

## 2023-11-01 PROBLEM — R79.9 ABNORMAL FINDING OF BLOOD CHEMISTRY, UNSPECIFIED: Status: ACTIVE | Noted: 2023-11-01

## 2023-11-01 PROBLEM — C18.2 MALIGNANT NEOPLASM OF ASCENDING COLON: Status: RESOLVED | Noted: 2021-11-04 | Resolved: 2023-11-01

## 2023-11-09 ENCOUNTER — TELEPHONE (OUTPATIENT)
Dept: FAMILY MEDICINE | Facility: CLINIC | Age: 83
End: 2023-11-09

## 2023-11-09 DIAGNOSIS — J41.0 SIMPLE CHRONIC BRONCHITIS: Primary | ICD-10-CM

## 2023-11-09 RX ORDER — FLUTICASONE FUROATE, UMECLIDINIUM BROMIDE AND VILANTEROL TRIFENATATE 200; 62.5; 25 UG/1; UG/1; UG/1
1 POWDER RESPIRATORY (INHALATION) DAILY
Qty: 60 EACH | Refills: 5 | Status: SHIPPED | OUTPATIENT
Start: 2023-11-09 | End: 2024-01-22 | Stop reason: SDUPTHER

## 2023-11-09 NOTE — TELEPHONE ENCOUNTER
Spoke with states she is using Trelegy once daily after breakfast and it's working out really well. She has three more uses. Would like to know if she can get another prescription sent into pharmacy [facility] on [address]. Please advise.

## 2023-11-09 NOTE — TELEPHONE ENCOUNTER
----- Message from Danita Madrid sent at 11/9/2023  9:56 AM CST -----  - 9:40-pt is calling about her trelogy   [phone]

## 2024-01-19 ENCOUNTER — TELEPHONE (OUTPATIENT)
Dept: FAMILY MEDICINE | Facility: CLINIC | Age: 84
End: 2024-01-19
Payer: MEDICARE

## 2024-01-19 LAB
ALBUMIN SERPL-MCNC: 4.4 G/DL (ref 3.6–5.1)
ALBUMIN/CREAT UR: 10 MCG/MG CREAT
ALBUMIN/GLOB SERPL: 1.5 (CALC) (ref 1–2.5)
ALP SERPL-CCNC: 70 U/L (ref 37–153)
ALT SERPL-CCNC: 13 U/L (ref 6–29)
AST SERPL-CCNC: 19 U/L (ref 10–35)
BASOPHILS # BLD AUTO: 79 CELLS/UL (ref 0–200)
BASOPHILS NFR BLD AUTO: 1 %
BILIRUB SERPL-MCNC: 0.6 MG/DL (ref 0.2–1.2)
BUN SERPL-MCNC: 11 MG/DL (ref 7–25)
BUN/CREAT SERPL: ABNORMAL (CALC) (ref 6–22)
CALCIUM SERPL-MCNC: 9.5 MG/DL (ref 8.6–10.4)
CHLORIDE SERPL-SCNC: 100 MMOL/L (ref 98–110)
CHOLEST SERPL-MCNC: 146 MG/DL
CHOLEST/HDLC SERPL: 1.9 (CALC)
CO2 SERPL-SCNC: 31 MMOL/L (ref 20–32)
CREAT SERPL-MCNC: 0.6 MG/DL (ref 0.6–0.95)
CREAT UR-MCNC: 29 MG/DL (ref 20–275)
EGFR: 88 ML/MIN/1.73M2
EOSINOPHIL # BLD AUTO: 198 CELLS/UL (ref 15–500)
EOSINOPHIL NFR BLD AUTO: 2.5 %
ERYTHROCYTE [DISTWIDTH] IN BLOOD BY AUTOMATED COUNT: 13.4 % (ref 11–15)
GLOBULIN SER CALC-MCNC: 2.9 G/DL (CALC) (ref 1.9–3.7)
GLUCOSE SERPL-MCNC: 100 MG/DL (ref 65–99)
HBA1C MFR BLD: 5.9 % OF TOTAL HGB
HCT VFR BLD AUTO: 44 % (ref 35–45)
HDLC SERPL-MCNC: 75 MG/DL
HGB BLD-MCNC: 14.1 G/DL (ref 11.7–15.5)
LDLC SERPL CALC-MCNC: 54 MG/DL (CALC)
LYMPHOCYTES # BLD AUTO: 1959 CELLS/UL (ref 850–3900)
LYMPHOCYTES NFR BLD AUTO: 24.8 %
MAGNESIUM SERPL-MCNC: 2.2 MG/DL (ref 1.5–2.5)
MCH RBC QN AUTO: 30.5 PG (ref 27–33)
MCHC RBC AUTO-ENTMCNC: 32 G/DL (ref 32–36)
MCV RBC AUTO: 95 FL (ref 80–100)
MICROALBUMIN UR-MCNC: 0.3 MG/DL
MONOCYTES # BLD AUTO: 577 CELLS/UL (ref 200–950)
MONOCYTES NFR BLD AUTO: 7.3 %
NEUTROPHILS # BLD AUTO: 5088 CELLS/UL (ref 1500–7800)
NEUTROPHILS NFR BLD AUTO: 64.4 %
NONHDLC SERPL-MCNC: 71 MG/DL (CALC)
PLATELET # BLD AUTO: 300 THOUSAND/UL (ref 140–400)
PMV BLD REES-ECKER: 9.4 FL (ref 7.5–12.5)
POTASSIUM SERPL-SCNC: 4.6 MMOL/L (ref 3.5–5.3)
PROT SERPL-MCNC: 7.3 G/DL (ref 6.1–8.1)
RBC # BLD AUTO: 4.63 MILLION/UL (ref 3.8–5.1)
SODIUM SERPL-SCNC: 140 MMOL/L (ref 135–146)
T4 FREE SERPL-MCNC: 1.1 NG/DL (ref 0.8–1.8)
TRIGL SERPL-MCNC: 90 MG/DL
TSH SERPL-ACNC: 1.46 MIU/L (ref 0.4–4.5)
WBC # BLD AUTO: 7.9 THOUSAND/UL (ref 3.8–10.8)

## 2024-01-19 NOTE — PROGRESS NOTES
Not urgent but can let Mrs. Teressa know her labs look great. Kidneys are healthy. Liver enzymes are normal. No anemia. Cholesterol is perfect. Thyroid normal. No changes. Keep up the healthy lifestyle!

## 2024-01-19 NOTE — TELEPHONE ENCOUNTER
----- Message from ADA Sheth-CNP sent at 1/19/2024  8:26 AM CST -----  Not urgent but can let  Teressa know her labs look great. Kidneys are healthy. Liver enzymes are normal. No anemia. Cholesterol is perfect. Thyroid normal. No changes. Keep up the healthy lifestyle!

## 2024-01-22 ENCOUNTER — OFFICE VISIT (OUTPATIENT)
Dept: FAMILY MEDICINE | Facility: CLINIC | Age: 84
End: 2024-01-22
Payer: MEDICARE

## 2024-01-22 VITALS
OXYGEN SATURATION: 97 % | SYSTOLIC BLOOD PRESSURE: 126 MMHG | BODY MASS INDEX: 29.25 KG/M2 | HEART RATE: 53 BPM | HEIGHT: 56 IN | DIASTOLIC BLOOD PRESSURE: 61 MMHG | WEIGHT: 130 LBS

## 2024-01-22 DIAGNOSIS — I10 PRIMARY HYPERTENSION: Primary | ICD-10-CM

## 2024-01-22 DIAGNOSIS — E78.2 MIXED HYPERLIPIDEMIA: ICD-10-CM

## 2024-01-22 DIAGNOSIS — J41.0 SIMPLE CHRONIC BRONCHITIS: ICD-10-CM

## 2024-01-22 PROCEDURE — 1159F MED LIST DOCD IN RCRD: CPT | Mod: CPTII,S$GLB,,

## 2024-01-22 PROCEDURE — 1160F RVW MEDS BY RX/DR IN RCRD: CPT | Mod: CPTII,S$GLB,,

## 2024-01-22 PROCEDURE — 1101F PT FALLS ASSESS-DOCD LE1/YR: CPT | Mod: CPTII,S$GLB,,

## 2024-01-22 PROCEDURE — 99213 OFFICE O/P EST LOW 20 MIN: CPT | Mod: S$GLB,,,

## 2024-01-22 PROCEDURE — 3288F FALL RISK ASSESSMENT DOCD: CPT | Mod: CPTII,S$GLB,,

## 2024-01-22 PROCEDURE — 3078F DIAST BP <80 MM HG: CPT | Mod: CPTII,S$GLB,,

## 2024-01-22 PROCEDURE — 3074F SYST BP LT 130 MM HG: CPT | Mod: CPTII,S$GLB,,

## 2024-01-22 RX ORDER — SIMVASTATIN 20 MG/1
20 TABLET, FILM COATED ORAL NIGHTLY
Qty: 90 TABLET | Refills: 3 | Status: SHIPPED | OUTPATIENT
Start: 2024-04-01 | End: 2025-04-01

## 2024-01-22 RX ORDER — MONTELUKAST SODIUM 10 MG/1
10 TABLET ORAL NIGHTLY
Qty: 90 TABLET | Refills: 3 | Status: SHIPPED | OUTPATIENT
Start: 2024-04-01 | End: 2025-04-01

## 2024-01-22 RX ORDER — ENALAPRIL MALEATE 10 MG/1
10 TABLET ORAL DAILY
Qty: 90 TABLET | Refills: 3 | Status: SHIPPED | OUTPATIENT
Start: 2024-04-01 | End: 2025-04-01

## 2024-01-22 RX ORDER — FLUTICASONE FUROATE, UMECLIDINIUM BROMIDE AND VILANTEROL TRIFENATATE 200; 62.5; 25 UG/1; UG/1; UG/1
1 POWDER RESPIRATORY (INHALATION) DAILY
Qty: 60 EACH | Refills: 5 | Status: SHIPPED | OUTPATIENT
Start: 2024-05-01 | End: 2025-05-01

## 2024-01-22 RX ORDER — NITROFURANTOIN 25; 75 MG/1; MG/1
100 CAPSULE ORAL 2 TIMES DAILY
COMMUNITY
Start: 2024-01-02 | End: 2024-01-22

## 2024-01-22 RX ORDER — PHENAZOPYRIDINE HYDROCHLORIDE 100 MG/1
100 TABLET, FILM COATED ORAL 2 TIMES DAILY
COMMUNITY
Start: 2024-01-02 | End: 2024-01-22

## 2024-01-22 NOTE — PATIENT INSTRUCTIONS
Hi Teressa,     If you are due for any health screening(s) below please notify me so we can arrange them to be ordered and scheduled. Most healthy patients at your age complete them, but you are free to accept or refuse.     If you can't do it, I'll definitely understand. If you can, I'd certainly appreciate it!    All of your core healthy metrics are met.                   Scheduling phone number for DXA (bone density) test to check for osteoporosis is [phone]

## 2024-01-26 ENCOUNTER — TELEPHONE (OUTPATIENT)
Dept: FAMILY MEDICINE | Facility: CLINIC | Age: 84
End: 2024-01-26
Payer: MEDICARE

## 2024-01-26 NOTE — TELEPHONE ENCOUNTER
Pt states she was seen Monday. She started experiencing symptoms of covid yesterday  and would like to be tested. Advised pt no appointments available today . She is going to do a home covid test and call back with results. .

## 2024-01-26 NOTE — TELEPHONE ENCOUNTER
----- Message from Danita Madrid sent at 1/26/2024  9:53 AM CST -----  Pt is calling back from an hour ago. Wanting to be seen for possible covid   [phone]

## 2024-01-28 NOTE — PROGRESS NOTES
SUBJECTIVE:    Patient ID: Teressa Benson is a 83 y.o. female.    Chief Complaint: Follow-up (No bottles/no refills needed/no complaints)    83-year-old established female patient presents to clinic today 83-year-old established female patient presents to clinic today for routine checkup.  Patient has no acute complaints or concerns at this time.  Patient's chronic conditions are well managed.  She does need some refills today.    Shingles Vaccine(1 of 2) due on 05/19/2017-we did discuss this vaccine.  Patient states she will consider it.  DEXA Scan due on 03/28/2020-patient opts not to continue getting DEXA scan testing.  She states that she will take calcium and vitamin-D and will be cautious when ambulating.  TETANUS VACCINE due on 02/22/2027  Lipid Panel due on 01/18/2029   Discussed RSV vaccine and reminder given for patient to request this vaccination next time she visits her pharmacy.  We did discuss what it is for, what prevents, possible side effects.  Patient verbalized understanding.  Patient does not wish to have mammograms anymore.        Follow-up  Associated symptoms include congestion and coughing. Pertinent negatives include no abdominal pain, arthralgias, chest pain, chills, fatigue, fever, headaches, myalgias, nausea, numbness, rash, sore throat, vomiting or weakness.       Office Visit on 10/31/2023   Component Date Value Ref Range Status    WBC 01/18/2024 7.9  3.8 - 10.8 Thousand/uL Final    RBC 01/18/2024 4.63  3.80 - 5.10 Million/uL Final    Hemoglobin 01/18/2024 14.1  11.7 - 15.5 g/dL Final    Hematocrit 01/18/2024 44.0  35.0 - 45.0 % Final    MCV 01/18/2024 95.0  80.0 - 100.0 fL Final    MCH 01/18/2024 30.5  27.0 - 33.0 pg Final    MCHC 01/18/2024 32.0  32.0 - 36.0 g/dL Final    RDW 01/18/2024 13.4  11.0 - 15.0 % Final    Platelets 01/18/2024 300  140 - 400 Thousand/uL Final    MPV 01/18/2024 9.4  7.5 - 12.5 fL Final    Neutrophils, Abs 01/18/2024 5,088  1,500 - 7,800 cells/uL Final     Lymph # 01/18/2024 1,959  850 - 3,900 cells/uL Final    Mono # 01/18/2024 577  200 - 950 cells/uL Final    Eos # 01/18/2024 198  15 - 500 cells/uL Final    Baso # 01/18/2024 79  0 - 200 cells/uL Final    Neutrophils Relative 01/18/2024 64.4  % Final    Lymph % 01/18/2024 24.8  % Final    Mono % 01/18/2024 7.3  % Final    Eosinophil % 01/18/2024 2.5  % Final    Basophil % 01/18/2024 1.0  % Final    Glucose 01/18/2024 100 (H)  65 - 99 mg/dL Final    BUN 01/18/2024 11  7 - 25 mg/dL Final    Creatinine 01/18/2024 0.60  0.60 - 0.95 mg/dL Final    eGFR 01/18/2024 88  > OR = 60 mL/min/1.73m2 Final    BUN/Creatinine Ratio 01/18/2024 SEE NOTE:  6 - 22 (calc) Final    Sodium 01/18/2024 140  135 - 146 mmol/L Final    Potassium 01/18/2024 4.6  3.5 - 5.3 mmol/L Final    Chloride 01/18/2024 100  98 - 110 mmol/L Final    CO2 01/18/2024 31  20 - 32 mmol/L Final    Calcium 01/18/2024 9.5  8.6 - 10.4 mg/dL Final    Total Protein 01/18/2024 7.3  6.1 - 8.1 g/dL Final    Albumin 01/18/2024 4.4  3.6 - 5.1 g/dL Final    Globulin, Total 01/18/2024 2.9  1.9 - 3.7 g/dL (calc) Final    Albumin/Globulin Ratio 01/18/2024 1.5  1.0 - 2.5 (calc) Final    Total Bilirubin 01/18/2024 0.6  0.2 - 1.2 mg/dL Final    Alkaline Phosphatase 01/18/2024 70  37 - 153 U/L Final    AST 01/18/2024 19  10 - 35 U/L Final    ALT 01/18/2024 13  6 - 29 U/L Final    Hemoglobin A1C 01/18/2024 5.9 (H)  <5.7 % of total Hgb Final    Cholesterol 01/18/2024 146  <200 mg/dL Final    HDL 01/18/2024 75  > OR = 50 mg/dL Final    Triglycerides 01/18/2024 90  <150 mg/dL Final    LDL Cholesterol 01/18/2024 54  mg/dL (calc) Final    HDL/Cholesterol Ratio 01/18/2024 1.9  <5.0 (calc) Final    Non HDL Chol. (LDL+VLDL) 01/18/2024 71  <130 mg/dL (calc) Final    Magnesium 01/18/2024 2.2  1.5 - 2.5 mg/dL Final    Creatinine, Urine 01/18/2024 29  20 - 275 mg/dL Final    Microalb, Ur 01/18/2024 0.3  See Note: mg/dL Final    Microalb/Creat Ratio 01/18/2024 10  <30 mcg/mg creat Final    T4,  Free 01/18/2024 1.1  0.8 - 1.8 ng/dL Final    TSH 01/18/2024 1.46  0.40 - 4.50 mIU/L Final       Past Medical History:   Diagnosis Date    Abnormal gastrointestinal PET scan 11/4/2021    Allergy     Arthritis     Asthma     pt denies    Cancer of ascending colon 11/4/2021    Cataract     COPD (chronic obstructive pulmonary disease)     Diverticulitis     DJD (degenerative joint disease)     Full dentures     [place] (hard of hearing)     BILAT AIDS    Hypertension     IGT (impaired glucose tolerance)     Iron deficiency anemia due to chronic blood loss 11/4/2021    Liver nodule 11/4/2021    Other and unspecified hyperlipidemia     Rectal cancer 11/4/2021    S/P right colectomy 1/24/2022    Squamous cell carcinoma of skin 2020    left inner thigh    Urinary tract infection      Social History     Socioeconomic History    Marital status:     Number of children: 4   Tobacco Use    Smoking status: Former     Types: Cigarettes    Smokeless tobacco: Never   Substance and Sexual Activity    Alcohol use: Never    Drug use: Never    Sexual activity: Not Currently     Past Surgical History:   Procedure Laterality Date    APPENDECTOMY      CHOLECYSTECTOMY      COLON SURGERY  12/2021    COLONOSCOPY N/A 11/30/2021    Procedure: COLONOSCOPY WITH ENDOSCOPIC MUCOSAL RESECTION;  Surgeon: Raul Morales III, MD;  Location: [facility];  Service: Endoscopy;  Laterality: N/A;    COLONOSCOPY N/A 5/3/2022    Procedure: COLONOSCOPY;  Surgeon: Raul Morales III, MD;  Location: [facility];  Service: Endoscopy;  Laterality: N/A;    COLONOSCOPY W/ POLYPECTOMY  11/30/2021    ENDOSCOPIC ULTRASOUND OF UPPER GASTROINTESTINAL TRACT  11/30/2021    ENDOSCOPIC ULTRASOUND OF UPPER GASTROINTESTINAL TRACT N/A 11/30/2021    Procedure: ULTRASOUND, UPPER GI TRACT, ENDOSCOPIC;  Surgeon: Raul Morales III, MD;  Location: [facility];  Service: Endoscopy;  Laterality: N/A;    EYE SURGERY      cataract    HYSTERECTOMY      TONSILLECTOMY,  ADENOIDECTOMY       Family History   Problem Relation Age of Onset    Asthma Mother     Heart failure Father     Liver disease Sister     No Known Problems Daughter     No Known Problems Daughter     No Known Problems Daughter     No Known Problems Son     No Known Problems Maternal Aunt     No Known Problems Maternal Uncle     No Known Problems Paternal Aunt     No Known Problems Paternal Uncle     No Known Problems Maternal Grandmother     No Known Problems Maternal Grandfather     No Known Problems Paternal Grandmother     No Known Problems Paternal Grandfather        Review of patient's allergies indicates:   Allergen Reactions    Demerol [meperidine] Other (See Comments)     ams    Phenergan [promethazine] Other (See Comments)     ams       Current Outpatient Medications:     albuterol (PROVENTIL) 2.5 mg /3 mL (0.083 %) nebulizer solution, Take 3 mLs (2.5 mg total) by nebulization every 6 (six) hours as needed for Wheezing. Rescue   USES TID, Disp: 360 mL, Rfl: 11    [START ON 4/1/2024] enalapril (VASOTEC) 10 MG tablet, Take 1 tablet (10 mg total) by mouth once daily., Disp: 90 tablet, Rfl: 3    [START ON 5/1/2024] fluticasone-umeclidin-vilanter (TRELEGY ELLIPTA) 200-62.5-25 mcg inhaler, Inhale 1 puff into the lungs once daily., Disp: 60 each, Rfl: 5    [START ON 4/1/2024] montelukast (SINGULAIR) 10 mg tablet, Take 1 tablet (10 mg total) by mouth every evening., Disp: 90 tablet, Rfl: 3    [START ON 4/1/2024] simvastatin (ZOCOR) 20 MG tablet, Take 1 tablet (20 mg total) by mouth every evening., Disp: 90 tablet, Rfl: 3    Review of Systems   Constitutional:  Negative for activity change, appetite change, chills, fatigue, fever and unexpected weight change.   HENT:  Positive for nasal congestion and postnasal drip. Negative for ear pain, rhinorrhea, sore throat and trouble swallowing.    Eyes:  Negative for discharge and visual disturbance.   Respiratory:  Positive for cough and shortness of breath. Negative for  "apnea and wheezing.    Cardiovascular:  Negative for chest pain, palpitations and leg swelling.   Gastrointestinal:  Negative for abdominal pain, blood in stool, constipation, diarrhea, nausea, vomiting and reflux.   Genitourinary:  Negative for bladder incontinence, dysuria, frequency and urgency.   Musculoskeletal:  Negative for arthralgias, gait problem, leg pain and myalgias.   Integumentary:  Negative for rash and mole/lesion.   Allergic/Immunologic: Positive for environmental allergies.   Neurological:  Negative for dizziness, tremors, weakness, light-headedness, numbness and headaches.   Hematological:  Does not bruise/bleed easily.   Psychiatric/Behavioral:  Negative for dysphoric mood, sleep disturbance and suicidal ideas. The patient is not nervous/anxious.            Objective:      Vitals:    01/22/24 1232   BP: 126/61   Pulse: (!) 53   SpO2: 97%   Weight: 59 kg (130 lb)   Height: 4' 8" (1.422 m)     Physical Exam  Vitals and nursing note reviewed.   Constitutional:       General: She is not in acute distress.     Appearance: Normal appearance. She is well-developed and normal weight. She is not ill-appearing.   HENT:      Head: Normocephalic and atraumatic.      Right Ear: Tympanic membrane, ear canal and external ear normal.      Left Ear: Tympanic membrane, ear canal and external ear normal.      Nose: Nose normal. No congestion or rhinorrhea.      Mouth/Throat:      Mouth: Mucous membranes are moist.      Pharynx: Oropharynx is clear. No posterior oropharyngeal erythema.   Eyes:      General: No scleral icterus.     Pupils: Pupils are equal, round, and reactive to light.   Neck:      Thyroid: No thyromegaly.      Vascular: No carotid bruit.   Cardiovascular:      Rate and Rhythm: Normal rate and regular rhythm.      Pulses: Normal pulses.      Heart sounds: Normal heart sounds. No murmur heard.  Pulmonary:      Effort: Pulmonary effort is normal.      Breath sounds: Normal breath sounds. No wheezing " or rales.   Abdominal:      General: Bowel sounds are normal. There is no distension.      Palpations: Abdomen is soft.      Tenderness: There is no abdominal tenderness.   Musculoskeletal:         General: Normal range of motion.      Cervical back: Normal range of motion and neck supple.      Lumbar back: Normal. No spasms.      Right lower leg: No edema.      Left lower leg: No edema.   Skin:     General: Skin is warm and dry.      Capillary Refill: Capillary refill takes 2 to 3 seconds.      Findings: No rash.   Neurological:      General: No focal deficit present.      Mental Status: She is alert and oriented to person, place, and time.      Cranial Nerves: No cranial nerve deficit.      Coordination: Coordination normal.   Psychiatric:         Mood and Affect: Mood normal.         Behavior: Behavior normal.         Thought Content: Thought content normal.         Judgment: Judgment normal.           Assessment:       1. Primary hypertension    2. Simple chronic bronchitis    3. Mixed hyperlipidemia         Plan:       Primary hypertension  Well controlled. Continue as is. Refills today.  -     enalapril (VASOTEC) 10 MG tablet; Take 1 tablet (10 mg total) by mouth once daily.  Dispense: 90 tablet; Refill: 3    Simple chronic bronchitis  Doing well. States trelegy works well for her. Needs refills.  -     fluticasone-umeclidin-vilanter (TRELEGY ELLIPTA) 200-62.5-25 mcg inhaler; Inhale 1 puff into the lungs once daily.  Dispense: 60 each; Refill: 5  -     montelukast (SINGULAIR) 10 mg tablet; Take 1 tablet (10 mg total) by mouth every evening.  Dispense: 90 tablet; Refill: 3    Mixed hyperlipidemia  Stable. Needs refills.  -     simvastatin (ZOCOR) 20 MG tablet; Take 1 tablet (20 mg total) by mouth every evening.  Dispense: 90 tablet; Refill: 3      Follow up in about 6 months (around 7/22/2024) for HTN, HLD.        1/28/2024 Danita Crews

## 2024-06-25 DIAGNOSIS — Z00.00 ENCOUNTER FOR MEDICARE ANNUAL WELLNESS EXAM: ICD-10-CM

## 2024-07-19 ENCOUNTER — OFFICE VISIT (OUTPATIENT)
Dept: FAMILY MEDICINE | Facility: CLINIC | Age: 84
End: 2024-07-19
Payer: MEDICARE

## 2024-07-19 VITALS
HEIGHT: 58 IN | SYSTOLIC BLOOD PRESSURE: 128 MMHG | DIASTOLIC BLOOD PRESSURE: 76 MMHG | OXYGEN SATURATION: 95 % | BODY MASS INDEX: 27.32 KG/M2 | HEART RATE: 86 BPM | WEIGHT: 130.13 LBS

## 2024-07-19 DIAGNOSIS — L91.8 CUTANEOUS SKIN TAGS: ICD-10-CM

## 2024-07-19 DIAGNOSIS — J41.0 SIMPLE CHRONIC BRONCHITIS: ICD-10-CM

## 2024-07-19 DIAGNOSIS — E78.2 MIXED HYPERLIPIDEMIA: ICD-10-CM

## 2024-07-19 DIAGNOSIS — I10 PRIMARY HYPERTENSION: Primary | ICD-10-CM

## 2024-07-19 DIAGNOSIS — J06.9 UPPER RESPIRATORY TRACT INFECTION, UNSPECIFIED TYPE: ICD-10-CM

## 2024-07-19 DIAGNOSIS — H61.23 BILATERAL IMPACTED CERUMEN: ICD-10-CM

## 2024-07-19 PROCEDURE — 1126F AMNT PAIN NOTED NONE PRSNT: CPT | Mod: CPTII,,,

## 2024-07-19 PROCEDURE — 3288F FALL RISK ASSESSMENT DOCD: CPT | Mod: CPTII,,,

## 2024-07-19 PROCEDURE — 99214 OFFICE O/P EST MOD 30 MIN: CPT | Mod: ,,,

## 2024-07-19 PROCEDURE — 1159F MED LIST DOCD IN RCRD: CPT | Mod: CPTII,,,

## 2024-07-19 PROCEDURE — 1101F PT FALLS ASSESS-DOCD LE1/YR: CPT | Mod: CPTII,,,

## 2024-07-19 RX ORDER — ALBUTEROL SULFATE 0.83 MG/ML
2.5 SOLUTION RESPIRATORY (INHALATION) 2 TIMES DAILY
Qty: 180 ML | Refills: 11 | Status: SHIPPED | OUTPATIENT
Start: 2024-07-19 | End: 2025-07-19

## 2024-07-19 RX ORDER — AZITHROMYCIN 250 MG/1
TABLET, FILM COATED ORAL
Qty: 6 EACH | Refills: 0 | Status: SHIPPED | OUTPATIENT
Start: 2024-07-19 | End: 2024-07-24

## 2024-08-07 PROBLEM — L91.8 CUTANEOUS SKIN TAGS: Status: ACTIVE | Noted: 2024-08-07

## 2024-08-08 NOTE — PROGRESS NOTES
SUBJECTIVE:    Patient ID: Teressa Benson is a 84 y.o. female.    Chief Complaint: Hypertension, Hyperlipidemia, and Follow-up (No Bottles//yes refills, need updated instruction//spot on right forearm would like to see derm//sinus drainage in throat and ears, little cough since yesterday//-ERL)    Hypertension, Hyperlipidemia, and Follow-up (No Bottles//yes refills, need updated instruction//spot on right forearm would like to see derm//sinus drainage in throat and ears, little cough since yesterday//-ERL)    84-year-old established female patient presents to clinic today for follow-up.  She would also like a referral to Dermatology.  She reports today that she has been having some nasal congestion, scratchy throat, some pressure in her ears and slight cough since yesterday.    Hypertension    Hyperlipidemia    Follow-up  Associated symptoms include congestion, coughing and a sore throat.       No visits with results within 6 Month(s) from this visit.   Latest known visit with results is:   Office Visit on 10/31/2023   Component Date Value Ref Range Status    WBC 01/18/2024 7.9  3.8 - 10.8 Thousand/uL Final    RBC 01/18/2024 4.63  3.80 - 5.10 Million/uL Final    Hemoglobin 01/18/2024 14.1  11.7 - 15.5 g/dL Final    Hematocrit 01/18/2024 44.0  35.0 - 45.0 % Final    MCV 01/18/2024 95.0  80.0 - 100.0 fL Final    MCH 01/18/2024 30.5  27.0 - 33.0 pg Final    MCHC 01/18/2024 32.0  32.0 - 36.0 g/dL Final    RDW 01/18/2024 13.4  11.0 - 15.0 % Final    Platelets 01/18/2024 300  140 - 400 Thousand/uL Final    MPV 01/18/2024 9.4  7.5 - 12.5 fL Final    Neutrophils, Abs 01/18/2024 5,088  1,500 - 7,800 cells/uL Final    Lymph # 01/18/2024 1,959  850 - 3,900 cells/uL Final    Mono # 01/18/2024 577  200 - 950 cells/uL Final    Eos # 01/18/2024 198  15 - 500 cells/uL Final    Baso # 01/18/2024 79  0 - 200 cells/uL Final    Neutrophils Relative 01/18/2024 64.4  % Final    Lymph % 01/18/2024 24.8  % Final    Mono % 01/18/2024  7.3  % Final    Eosinophil % 01/18/2024 2.5  % Final    Basophil % 01/18/2024 1.0  % Final    Glucose 01/18/2024 100 (H)  65 - 99 mg/dL Final    BUN 01/18/2024 11  7 - 25 mg/dL Final    Creatinine 01/18/2024 0.60  0.60 - 0.95 mg/dL Final    eGFR 01/18/2024 88  > OR = 60 mL/min/1.73m2 Final    BUN/Creatinine Ratio 01/18/2024 SEE NOTE:  6 - 22 (calc) Final    Sodium 01/18/2024 140  135 - 146 mmol/L Final    Potassium 01/18/2024 4.6  3.5 - 5.3 mmol/L Final    Chloride 01/18/2024 100  98 - 110 mmol/L Final    CO2 01/18/2024 31  20 - 32 mmol/L Final    Calcium 01/18/2024 9.5  8.6 - 10.4 mg/dL Final    Total Protein 01/18/2024 7.3  6.1 - 8.1 g/dL Final    Albumin 01/18/2024 4.4  3.6 - 5.1 g/dL Final    Globulin, Total 01/18/2024 2.9  1.9 - 3.7 g/dL (calc) Final    Albumin/Globulin Ratio 01/18/2024 1.5  1.0 - 2.5 (calc) Final    Total Bilirubin 01/18/2024 0.6  0.2 - 1.2 mg/dL Final    Alkaline Phosphatase 01/18/2024 70  37 - 153 U/L Final    AST 01/18/2024 19  10 - 35 U/L Final    ALT 01/18/2024 13  6 - 29 U/L Final    Hemoglobin A1C 01/18/2024 5.9 (H)  <5.7 % of total Hgb Final    Cholesterol 01/18/2024 146  <200 mg/dL Final    HDL 01/18/2024 75  > OR = 50 mg/dL Final    Triglycerides 01/18/2024 90  <150 mg/dL Final    LDL Cholesterol 01/18/2024 54  mg/dL (calc) Final    HDL/Cholesterol Ratio 01/18/2024 1.9  <5.0 (calc) Final    Non HDL Chol. (LDL+VLDL) 01/18/2024 71  <130 mg/dL (calc) Final    Magnesium 01/18/2024 2.2  1.5 - 2.5 mg/dL Final    Creatinine, Urine 01/18/2024 29  20 - 275 mg/dL Final    Microalb, Ur 01/18/2024 0.3  See Note: mg/dL Final    Microalb/Creat Ratio 01/18/2024 10  <30 mcg/mg creat Final    T4, Free 01/18/2024 1.1  0.8 - 1.8 ng/dL Final    TSH 01/18/2024 1.46  0.40 - 4.50 mIU/L Final       Past Medical History:   Diagnosis Date    Abnormal gastrointestinal PET scan 11/4/2021    Allergy     Arthritis     Asthma     pt denies    Cancer of ascending colon 11/4/2021    Cataract     COPD (chronic  obstructive pulmonary disease)     Diverticulitis     DJD (degenerative joint disease)     Full dentures     [place] (hard of hearing)     BILAT AIDS    Hypertension     IGT (impaired glucose tolerance)     Iron deficiency anemia due to chronic blood loss 11/4/2021    Liver nodule 11/4/2021    Other and unspecified hyperlipidemia     Rectal cancer 11/4/2021    S/P right colectomy 1/24/2022    Squamous cell carcinoma of skin 2020    left inner thigh    Urinary tract infection      Social History     Socioeconomic History    Marital status:     Number of children: 4   Tobacco Use    Smoking status: Former     Types: Cigarettes    Smokeless tobacco: Never   Substance and Sexual Activity    Alcohol use: Never    Drug use: Never    Sexual activity: Not Currently     Past Surgical History:   Procedure Laterality Date    APPENDECTOMY      CHOLECYSTECTOMY      COLON SURGERY  12/2021    COLONOSCOPY N/A 11/30/2021    Procedure: COLONOSCOPY WITH ENDOSCOPIC MUCOSAL RESECTION;  Surgeon: Raul Morales III, MD;  Location: [facility];  Service: Endoscopy;  Laterality: N/A;    COLONOSCOPY N/A 5/3/2022    Procedure: COLONOSCOPY;  Surgeon: Raul Morales III, MD;  Location: [facility] ENDO;  Service: Endoscopy;  Laterality: N/A;    COLONOSCOPY W/ POLYPECTOMY  11/30/2021    ENDOSCOPIC ULTRASOUND OF UPPER GASTROINTESTINAL TRACT  11/30/2021    ENDOSCOPIC ULTRASOUND OF UPPER GASTROINTESTINAL TRACT N/A 11/30/2021    Procedure: ULTRASOUND, UPPER GI TRACT, ENDOSCOPIC;  Surgeon: Raul Morales III, MD;  Location: [facility];  Service: Endoscopy;  Laterality: N/A;    EYE SURGERY      cataract    HYSTERECTOMY      TONSILLECTOMY, ADENOIDECTOMY       Family History   Problem Relation Name Age of Onset    Asthma Mother      Heart failure Father      Liver disease Sister      No Known Problems Daughter      No Known Problems Daughter      No Known Problems Daughter      No Known Problems Son      No Known Problems Maternal Aunt      No  "Known Problems Maternal Uncle      No Known Problems Paternal Aunt      No Known Problems Paternal Uncle      No Known Problems Maternal Grandmother      No Known Problems Maternal Grandfather      No Known Problems Paternal Grandmother      No Known Problems Paternal Grandfather         The CVD Risk score (Nicoleino, et al., 2008) failed to calculate for the following reasons:    The 2008 CVD risk score is only valid for ages 30 to 74    All of your core healthy metrics are met.      Review of patient's allergies indicates:   Allergen Reactions    Demerol [meperidine] Other (See Comments)     ams    Phenergan [promethazine] Other (See Comments)     ams       Current Outpatient Medications:     enalapril (VASOTEC) 10 MG tablet, Take 1 tablet (10 mg total) by mouth once daily., Disp: 90 tablet, Rfl: 3    fluticasone-umeclidin-vilanter (TRELEGY ELLIPTA) 200-62.5-25 mcg inhaler, Inhale 1 puff into the lungs once daily., Disp: 60 each, Rfl: 5    montelukast (SINGULAIR) 10 mg tablet, Take 1 tablet (10 mg total) by mouth every evening., Disp: 90 tablet, Rfl: 3    simvastatin (ZOCOR) 20 MG tablet, Take 1 tablet (20 mg total) by mouth every evening., Disp: 90 tablet, Rfl: 3    albuterol (PROVENTIL) 2.5 mg /3 mL (0.083 %) nebulizer solution, Take 3 mLs (2.5 mg total) by nebulization 2 (two) times a day., Disp: 180 mL, Rfl: 11    Review of Systems   HENT:  Positive for nasal congestion, ear pain and sore throat.    Respiratory:  Positive for cough.            Objective:      Vitals:    07/19/24 0930   BP: 128/76   Pulse: 86   SpO2: 95%   Weight: 59 kg (130 lb 2 oz)   Height: 4' 9.5" (1.461 m)     Physical Exam  Vitals and nursing note reviewed.   Constitutional:       General: She is not in acute distress.     Appearance: Normal appearance. She is well-developed and normal weight. She is not ill-appearing.   HENT:      Head: Normocephalic and atraumatic.      Right Ear: There is impacted cerumen.      Left Ear: There is " impacted cerumen.      Nose: Nose normal. No congestion or rhinorrhea.      Mouth/Throat:      Pharynx: Oropharynx is clear. No posterior oropharyngeal erythema.   Eyes:      General: No scleral icterus.  Neck:      Thyroid: No thyromegaly.      Vascular: No carotid bruit.   Cardiovascular:      Rate and Rhythm: Normal rate and regular rhythm.      Pulses: Normal pulses.      Heart sounds: Normal heart sounds. No murmur heard.  Pulmonary:      Effort: Pulmonary effort is normal.      Breath sounds: Normal breath sounds. No wheezing or rales.   Abdominal:      Palpations: Abdomen is soft.      Tenderness: There is no abdominal tenderness.   Musculoskeletal:      Lumbar back: Normal. No spasms.      Right lower leg: No edema.      Left lower leg: No edema.   Lymphadenopathy:      Cervical: No cervical adenopathy.   Skin:     General: Skin is warm and dry.      Capillary Refill: Capillary refill takes 2 to 3 seconds.      Findings: No rash.   Neurological:      General: No focal deficit present.      Mental Status: She is alert and oriented to person, place, and time.      Cranial Nerves: No cranial nerve deficit.      Coordination: Coordination normal.   Psychiatric:         Mood and Affect: Mood normal.           Assessment:       1. Primary hypertension    2. Mixed hyperlipidemia    3. Cutaneous skin tags    4. Bilateral impacted cerumen    5. Simple chronic bronchitis    6. Upper respiratory tract infection, unspecified type         Plan:       Primary hypertension  Blood pressure goals discussed with patient.  Tolerating current medications.  BP controlled today.  Continue current management.  Labs for evaluation of health/monitoring of condition.     -     Lipid Panel; Future; Expected date: 08/08/2024  -     Microalbumin/Creatinine Ratio, Urine; Future; Expected date: 08/08/2024  -     Comprehensive Metabolic Panel; Future; Expected date: 08/08/2024  -     CBC Auto Differential; Future; Expected date:  08/08/2024  -     TSH w/reflex to FT4; Future; Expected date: 08/08/2024  -     Urinalysis, Reflex to Urine Culture Urine, Clean Catch; Future; Expected date: 08/08/2024    Mixed hyperlipidemia  Dyslipidemia:   - Controlled    - On statin per ACC recommendations, will continue. No untoward side effects reported.   - Monitor LDL yearly at minimum  Labs for evaluation of health/monitoring of condition.     -     Lipid Panel; Future; Expected date: 08/08/2024  -     Microalbumin/Creatinine Ratio, Urine; Future; Expected date: 08/08/2024  -     Comprehensive Metabolic Panel; Future; Expected date: 08/08/2024  -     CBC Auto Differential; Future; Expected date: 08/08/2024  -     TSH w/reflex to FT4; Future; Expected date: 08/08/2024    Cutaneous skin tags  Referral to dermatology request  -     Ambulatory referral/consult to Dermatology; Future; Expected date: 07/26/2024    Bilateral impacted cerumen  Removed today. Bilateral Tms wnl.  -     Ear wax removal    Simple chronic bronchitis  Continue with nebs prn  -     albuterol (PROVENTIL) 2.5 mg /3 mL (0.083 %) nebulizer solution; Take 3 mLs (2.5 mg total) by nebulization 2 (two) times a day.  Dispense: 180 mL; Refill: 11    Upper respiratory tract infection, unspecified type  Zpack for high risk resp patient  -     azithromycin (Z-SANTOS) 250 MG tablet; Take 2 tablets by mouth on day 1; Take 1 tablet by mouth on days 2-5  Dispense: 6 each; Refill: 0      Follow up in about 6 months (around 1/19/2025).        8/10/2024 Danita Crews

## 2024-09-19 ENCOUNTER — TELEPHONE (OUTPATIENT)
Dept: FAMILY MEDICINE | Facility: CLINIC | Age: 84
End: 2024-09-19
Payer: MEDICARE

## 2024-09-19 NOTE — TELEPHONE ENCOUNTER
----- Message from Danita Madrid sent at 9/19/2024 11:23 AM CDT -----  Pt would like to talk to nurse about trelogy issues   [phone]

## 2024-10-18 ENCOUNTER — CLINICAL SUPPORT (OUTPATIENT)
Dept: FAMILY MEDICINE | Facility: CLINIC | Age: 84
End: 2024-10-18
Payer: MEDICARE

## 2024-10-18 DIAGNOSIS — Z23 NEED FOR INFLUENZA VACCINATION: Primary | ICD-10-CM

## 2024-12-02 DIAGNOSIS — J41.0 SIMPLE CHRONIC BRONCHITIS: ICD-10-CM

## 2024-12-02 RX ORDER — ALBUTEROL SULFATE 0.83 MG/ML
2.5 SOLUTION RESPIRATORY (INHALATION) 2 TIMES DAILY
Qty: 180 ML | Refills: 11 | Status: SHIPPED | OUTPATIENT
Start: 2024-12-02 | End: 2024-12-03 | Stop reason: SDUPTHER

## 2024-12-02 NOTE — TELEPHONE ENCOUNTER
----- Message from Lis sent at 12/2/2024 10:09 AM CST -----  Vm: [phone]    Pt need a refill for albuterol for her breathing machine.    [phone]

## 2024-12-03 ENCOUNTER — CLINICAL SUPPORT (OUTPATIENT)
Dept: FAMILY MEDICINE | Facility: CLINIC | Age: 84
End: 2024-12-03
Payer: MEDICARE

## 2024-12-03 DIAGNOSIS — J41.0 SIMPLE CHRONIC BRONCHITIS: ICD-10-CM

## 2024-12-03 RX ORDER — ALBUTEROL SULFATE 0.83 MG/ML
2.5 SOLUTION RESPIRATORY (INHALATION) 3 TIMES DAILY
Qty: 270 ML | Refills: 11 | Status: SHIPPED | OUTPATIENT
Start: 2024-12-03 | End: 2025-12-03

## 2024-12-03 NOTE — PROGRESS NOTES
"Pt here for refill on her Albuterol Neb solution. Was sent in yesterday for twice daily. Would like printed rx for 3 times daily. She states when sent electronically it "takes forever for them to fill it." Danita notified.   "

## 2025-01-29 DIAGNOSIS — J41.0 SIMPLE CHRONIC BRONCHITIS: ICD-10-CM

## 2025-01-29 DIAGNOSIS — E78.2 MIXED HYPERLIPIDEMIA: ICD-10-CM

## 2025-01-29 RX ORDER — MONTELUKAST SODIUM 10 MG/1
10 TABLET ORAL NIGHTLY
Qty: 90 TABLET | Refills: 3 | Status: SHIPPED | OUTPATIENT
Start: 2025-01-29 | End: 2026-01-29

## 2025-01-29 RX ORDER — SIMVASTATIN 20 MG/1
20 TABLET, FILM COATED ORAL NIGHTLY
Qty: 90 TABLET | Refills: 3 | Status: SHIPPED | OUTPATIENT
Start: 2025-01-29 | End: 2026-01-29

## 2025-01-29 NOTE — TELEPHONE ENCOUNTER
Spoke with pt state she needed to r/s her canceled appt  She is also needing refills on Zocor and singgulair. To [facility]

## 2025-01-29 NOTE — TELEPHONE ENCOUNTER
----- Message from Danita sent at 1/29/2025  3:58 PM CST -----  - 3:46- pt would like to talk to nurse   [phone]

## 2025-02-11 ENCOUNTER — OFFICE VISIT (OUTPATIENT)
Dept: FAMILY MEDICINE | Facility: CLINIC | Age: 85
End: 2025-02-11
Payer: MEDICARE

## 2025-02-11 VITALS
SYSTOLIC BLOOD PRESSURE: 130 MMHG | HEART RATE: 73 BPM | BODY MASS INDEX: 26.66 KG/M2 | OXYGEN SATURATION: 96 % | DIASTOLIC BLOOD PRESSURE: 66 MMHG | HEIGHT: 58 IN | WEIGHT: 127 LBS

## 2025-02-11 DIAGNOSIS — R73.03 PREDIABETES: ICD-10-CM

## 2025-02-11 DIAGNOSIS — I10 PRIMARY HYPERTENSION: Primary | ICD-10-CM

## 2025-02-11 DIAGNOSIS — J41.1 BRONCHITIS, CHRONIC, MUCOPURULENT: ICD-10-CM

## 2025-02-11 DIAGNOSIS — E78.2 MIXED HYPERLIPIDEMIA: ICD-10-CM

## 2025-02-11 PROBLEM — M17.0 PRIMARY OSTEOARTHRITIS OF BOTH KNEES: Status: ACTIVE | Noted: 2017-07-28

## 2025-02-11 PROBLEM — R50.9 FEVER: Status: RESOLVED | Noted: 2021-12-23 | Resolved: 2025-02-11

## 2025-02-11 PROBLEM — R79.9 ABNORMAL FINDING OF BLOOD CHEMISTRY, UNSPECIFIED: Status: RESOLVED | Noted: 2023-11-01 | Resolved: 2025-02-11

## 2025-02-11 PROCEDURE — 1160F RVW MEDS BY RX/DR IN RCRD: CPT | Mod: CPTII,S$GLB,,

## 2025-02-11 PROCEDURE — 3288F FALL RISK ASSESSMENT DOCD: CPT | Mod: CPTII,S$GLB,,

## 2025-02-11 PROCEDURE — 1126F AMNT PAIN NOTED NONE PRSNT: CPT | Mod: CPTII,S$GLB,,

## 2025-02-11 PROCEDURE — 99213 OFFICE O/P EST LOW 20 MIN: CPT | Mod: S$GLB,,,

## 2025-02-11 PROCEDURE — 1159F MED LIST DOCD IN RCRD: CPT | Mod: CPTII,S$GLB,,

## 2025-02-11 PROCEDURE — 1101F PT FALLS ASSESS-DOCD LE1/YR: CPT | Mod: CPTII,S$GLB,,

## 2025-02-11 NOTE — PROGRESS NOTES
SUBJECTIVE:    Patient ID: Teressa Benson is a 85 y.o. female.    Chief Complaint: Follow-up (No bottles//Pt here for follow up//discuss next blood work due//discuss wheezing in the morning time that goes away after she does her albuterol neb tx//JL)    Follow-up      History of Present Illness    CHIEF COMPLAINT:  Teressa presents today for follow up    RESPIRATORY:  She experiences morning wheezing that improves with breathing machine use, which she continues 3 times daily. She discontinued Trelegy due to perceived side effects including muscle cramps and vision changes, though muscle cramps have persisted after discontinuation.    GI CONCERNS:  She experiences occasional diarrhea after meals that resolves with Pepto-Bismol effectively  when symptoms  occur.    GENITOURINARY:  She reports recent suspected UTI that resolved with increased water intake. She takes cranberry tablets for urinary health maintenance.    IMMUNIZATIONS:  She received flu vaccine in October at the clinic and COVID vaccine in November at [facility].      ROS:  General: -fever, -chills, -fatigue, -weight gain, -weight loss  Eyes: -vision changes, -redness, -discharge  ENT: -ear pain, -nasal congestion, -sore throat  Cardiovascular: -chest pain, -palpitations, -lower extremity edema  Respiratory: -cough, -shortness of breath  Gastrointestinal: -abdominal pain, -nausea, -vomiting, +diarrhea, -constipation, -blood in stool  Genitourinary: -dysuria, -hematuria, -frequency  Musculoskeletal: -joint pain, -muscle pain, +muscle cramps  Skin: -rash, -lesion  Neurological: -headache, -dizziness, -numbness, -tingling  Psychiatric: -anxiety, -depression, -sleep difficulty         No visits with results within 6 Month(s) from this visit.   Latest known visit with results is:   Office Visit on 10/31/2023   Component Date Value Ref Range Status    WBC 01/18/2024 7.9  3.8 - 10.8 Thousand/uL Final    RBC 01/18/2024 4.63  3.80 - 5.10 Million/uL Final     Hemoglobin 01/18/2024 14.1  11.7 - 15.5 g/dL Final    Hematocrit 01/18/2024 44.0  35.0 - 45.0 % Final    MCV 01/18/2024 95.0  80.0 - 100.0 fL Final    MCH 01/18/2024 30.5  27.0 - 33.0 pg Final    MCHC 01/18/2024 32.0  32.0 - 36.0 g/dL Final    RDW 01/18/2024 13.4  11.0 - 15.0 % Final    Platelets 01/18/2024 300  140 - 400 Thousand/uL Final    MPV 01/18/2024 9.4  7.5 - 12.5 fL Final    Neutrophils, Abs 01/18/2024 5,088  1,500 - 7,800 cells/uL Final    Lymph # 01/18/2024 1,959  850 - 3,900 cells/uL Final    Mono # 01/18/2024 577  200 - 950 cells/uL Final    Eos # 01/18/2024 198  15 - 500 cells/uL Final    Baso # 01/18/2024 79  0 - 200 cells/uL Final    Neutrophils Relative 01/18/2024 64.4  % Final    Lymph % 01/18/2024 24.8  % Final    Mono % 01/18/2024 7.3  % Final    Eosinophil % 01/18/2024 2.5  % Final    Basophil % 01/18/2024 1.0  % Final    Glucose 01/18/2024 100 (H)  65 - 99 mg/dL Final    BUN 01/18/2024 11  7 - 25 mg/dL Final    Creatinine 01/18/2024 0.60  0.60 - 0.95 mg/dL Final    eGFR 01/18/2024 88  > OR = 60 mL/min/1.73m2 Final    BUN/Creatinine Ratio 01/18/2024 SEE NOTE:  6 - 22 (calc) Final    Sodium 01/18/2024 140  135 - 146 mmol/L Final    Potassium 01/18/2024 4.6  3.5 - 5.3 mmol/L Final    Chloride 01/18/2024 100  98 - 110 mmol/L Final    CO2 01/18/2024 31  20 - 32 mmol/L Final    Calcium 01/18/2024 9.5  8.6 - 10.4 mg/dL Final    Total Protein 01/18/2024 7.3  6.1 - 8.1 g/dL Final    Albumin 01/18/2024 4.4  3.6 - 5.1 g/dL Final    Globulin, Total 01/18/2024 2.9  1.9 - 3.7 g/dL (calc) Final    Albumin/Globulin Ratio 01/18/2024 1.5  1.0 - 2.5 (calc) Final    Total Bilirubin 01/18/2024 0.6  0.2 - 1.2 mg/dL Final    Alkaline Phosphatase 01/18/2024 70  37 - 153 U/L Final    AST 01/18/2024 19  10 - 35 U/L Final    ALT 01/18/2024 13  6 - 29 U/L Final    Hemoglobin A1C 01/18/2024 5.9 (H)  <5.7 % of total Hgb Final    Cholesterol 01/18/2024 146  <200 mg/dL Final    HDL 01/18/2024 75  > OR = 50 mg/dL Final     Triglycerides 01/18/2024 90  <150 mg/dL Final    LDL Cholesterol 01/18/2024 54  mg/dL (calc) Final    HDL/Cholesterol Ratio 01/18/2024 1.9  <5.0 (calc) Final    Non HDL Chol. (LDL+VLDL) 01/18/2024 71  <130 mg/dL (calc) Final    Magnesium 01/18/2024 2.2  1.5 - 2.5 mg/dL Final    Creatinine, Urine 01/18/2024 29  20 - 275 mg/dL Final    Microalb, Ur 01/18/2024 0.3  See Note: mg/dL Final    Microalb/Creat Ratio 01/18/2024 10  <30 mcg/mg creat Final    T4, Free 01/18/2024 1.1  0.8 - 1.8 ng/dL Final    TSH 01/18/2024 1.46  0.40 - 4.50 mIU/L Final       Past Medical History:   Diagnosis Date    Abnormal finding of blood chemistry, unspecified 11/01/2023    Abnormal gastrointestinal PET scan 11/04/2021    Allergy     Arthritis     Asthma     pt denies    Cancer of ascending colon 11/04/2021    Cataract     COPD (chronic obstructive pulmonary disease)     Diverticulitis     DJD (degenerative joint disease)     Fever 12/23/2021    Full dentures     [place] (hard of hearing)     BILAT AIDS    Hypertension     IGT (impaired glucose tolerance)     Iron deficiency anemia due to chronic blood loss 11/04/2021    Liver nodule 11/04/2021    Other and unspecified hyperlipidemia     Rectal cancer 11/04/2021    S/P right colectomy 01/24/2022    Squamous cell carcinoma of skin 2020    left inner thigh    Urinary tract infection      Social History     Socioeconomic History    Marital status:     Number of children: 4   Tobacco Use    Smoking status: Former     Types: Cigarettes    Smokeless tobacco: Never   Substance and Sexual Activity    Alcohol use: Never    Drug use: Never    Sexual activity: Not Currently     Past Surgical History:   Procedure Laterality Date    APPENDECTOMY      CHOLECYSTECTOMY      COLON SURGERY  12/2021    COLONOSCOPY N/A 11/30/2021    Procedure: COLONOSCOPY WITH ENDOSCOPIC MUCOSAL RESECTION;  Surgeon: Raul Morales III, MD;  Location: [facility];  Service: Endoscopy;  Laterality: N/A;    COLONOSCOPY N/A  5/3/2022    Procedure: COLONOSCOPY;  Surgeon: Raul Morales III, MD;  Location: [facility] ENDO;  Service: Endoscopy;  Laterality: N/A;    COLONOSCOPY W/ POLYPECTOMY  11/30/2021    ENDOSCOPIC ULTRASOUND OF UPPER GASTROINTESTINAL TRACT  11/30/2021    ENDOSCOPIC ULTRASOUND OF UPPER GASTROINTESTINAL TRACT N/A 11/30/2021    Procedure: ULTRASOUND, UPPER GI TRACT, ENDOSCOPIC;  Surgeon: Raul Morales III, MD;  Location: [facility] ENDO;  Service: Endoscopy;  Laterality: N/A;    EYE SURGERY      cataract    HYSTERECTOMY      TONSILLECTOMY, ADENOIDECTOMY       Family History   Problem Relation Name Age of Onset    Asthma Mother      Heart failure Father      Liver disease Sister      No Known Problems Daughter      No Known Problems Daughter      No Known Problems Daughter      No Known Problems Son      No Known Problems Maternal Aunt      No Known Problems Maternal Uncle      No Known Problems Paternal Aunt      No Known Problems Paternal Uncle      No Known Problems Maternal Grandmother      No Known Problems Maternal Grandfather      No Known Problems Paternal Grandmother      No Known Problems Paternal Grandfather         The CVD Risk score (MIREYA'Jeremyino, et al., 2008) failed to calculate for the following reasons:    The 2008 CVD risk score is only valid for ages 30 to 74    All of your core healthy metrics are met.      Review of patient's allergies indicates:   Allergen Reactions    Demerol [meperidine] Other (See Comments)     ams    Phenergan [promethazine] Other (See Comments)     ams       Current Outpatient Medications:     albuterol (PROVENTIL) 2.5 mg /3 mL (0.083 %) nebulizer solution, Take 3 mLs (2.5 mg total) by nebulization 3 (three) times daily., Disp: 270 mL, Rfl: 11    enalapril (VASOTEC) 10 MG tablet, Take 1 tablet (10 mg total) by mouth once daily., Disp: 90 tablet, Rfl: 3    montelukast (SINGULAIR) 10 mg tablet, Take 1 tablet (10 mg total) by mouth every evening., Disp: 90 tablet, Rfl: 3    simvastatin  "(ZOCOR) 20 MG tablet, Take 1 tablet (20 mg total) by mouth every evening., Disp: 90 tablet, Rfl: 3    fluticasone-umeclidin-vilanter (TRELEGY ELLIPTA) 200-62.5-25 mcg inhaler, Inhale 1 puff into the lungs once daily. (Patient not taking: Reported on 2/11/2025), Disp: 60 each, Rfl: 5    Review of Systems        Objective:      Vitals:    02/11/25 1117   BP: 130/66   Pulse: 73   SpO2: 96%   Weight: 57.6 kg (127 lb)   Height: 4' 9.5" (1.461 m)     Physical Exam  Vitals and nursing note reviewed.   Constitutional:       General: She is not in acute distress.     Appearance: Normal appearance. She is well-developed and normal weight. She is not ill-appearing.   HENT:      Head: Normocephalic and atraumatic.      Right Ear: External ear normal.      Left Ear: External ear normal.      Nose: Nose normal.      Mouth/Throat:      Pharynx: Oropharynx is clear.   Eyes:      General: No scleral icterus.     Pupils: Pupils are equal, round, and reactive to light.   Neck:      Thyroid: No thyromegaly.      Vascular: No carotid bruit.   Cardiovascular:      Rate and Rhythm: Normal rate and regular rhythm.      Heart sounds: Normal heart sounds. No murmur heard.  Pulmonary:      Effort: Pulmonary effort is normal.      Breath sounds: Normal breath sounds.      Comments: coarse  Abdominal:      Palpations: Abdomen is soft.      Tenderness: There is no abdominal tenderness.   Musculoskeletal:         General: Normal range of motion.      Cervical back: Normal range of motion and neck supple.      Lumbar back: Normal. No spasms.      Right lower leg: No edema.      Left lower leg: No edema.   Skin:     General: Skin is warm and dry.      Capillary Refill: Capillary refill takes 2 to 3 seconds.      Findings: No rash.   Neurological:      General: No focal deficit present.      Mental Status: She is alert and oriented to person, place, and time.      Cranial Nerves: No cranial nerve deficit.      Coordination: Coordination normal. "   Psychiatric:         Mood and Affect: Mood normal.       Physical Exam            Assessment:       1. Primary hypertension    2. Prediabetes    3. Bronchitis, chronic, mucopurulent    4. Mixed hyperlipidemia         Plan:             Assessment & Plan    IMPRESSION:  - Assessed patient's overall health status, noting stable asthma managed with current regimen  - Evaluated recent UTI-like symptoms, which resolved with increased water intake  - Considered patient's report of occasional diarrhea after meals  - Reviewed vaccination status    SIMPLE CHRONIC BRONCHITIS:  - Assessed patient's respiratory condition.  - Teressa reports morning wheezing, which improves after nebulizer use.  - Auscultation revealed clear lungs.  - Teressa denies chest pain, shortness of breath, excessive mucus production, or sputum.  - Continue albuterol via nebulizer, 3 times daily for asthma management.    DIARRHEA:  - Teressa experiences occasional diarrhea after meals.  - Educated on the use of OTC antidiarrheal medications for symptom management.  - Recommend Pepto-Bismol as first-line treatment, with instructions to switch to Imodium if gastric irritation occurs.  - Use these medications as needed for occasional post-prandial diarrhea.    HYPERLIPIDEMIA:  Dyslipidemia:   - Controlled    - On statin per ACC recommendations, will continue. No untoward side effects reported.   - Monitor LDL yearly at minimum    HYPERTENSION:  Blood pressure goals discussed with patient.  Tolerating current medications.  BP controlled today.  Continue current management.    PREDIABETES:  -recheck A1C                        Follow up if symptoms worsen or fail to improve, for ANNUAL.        This note was generated with the assistance of ambient listening technology. Verbal consent was obtained by the patient and accompanying visitor(s) for the recording of patient appointment to facilitate this note. I attest to having reviewed and edited the generated note for  accuracy, though some syntax or spelling errors may persist. Please contact the author of this note for any clarification.      2/11/2025 Danita Crews

## 2025-02-22 DIAGNOSIS — Z00.00 ENCOUNTER FOR MEDICARE ANNUAL WELLNESS EXAM: ICD-10-CM

## 2025-04-01 DIAGNOSIS — I10 PRIMARY HYPERTENSION: ICD-10-CM

## 2025-04-01 RX ORDER — ENALAPRIL MALEATE 10 MG/1
10 TABLET ORAL DAILY
Qty: 90 TABLET | Refills: 3 | Status: SHIPPED | OUTPATIENT
Start: 2025-04-01 | End: 2026-04-01

## 2025-04-01 NOTE — TELEPHONE ENCOUNTER
----- Message from Jon Farrar sent at 4/1/2025 11:53 AM CDT -----  Pt requesting refills on enalapril please send to [facility] on [address] # [phone]

## (undated) DEVICE — SOL 9P NACL IRR PIC IL

## (undated) DEVICE — SUT CTD VICRYL 0 UND BR

## (undated) DEVICE — Device

## (undated) DEVICE — SPONGE BULKEE II ABSRB 6X6.75

## (undated) DEVICE — SEE MEDLINE ITEM 157116

## (undated) DEVICE — PACK CUSTOM UNIV BASIN SLI

## (undated) DEVICE — SEE MEDLINE ITEM 157117

## (undated) DEVICE — SUT MONOCRYL 4-0 PS-2

## (undated) DEVICE — UNDERGLOVE BIOGEL PI SZ 6.5 LF

## (undated) DEVICE — SEE MEDLINE ITEM 107746

## (undated) DEVICE — PAD K-THERMIA 24IN X 60IN

## (undated) DEVICE — APPLICATOR CHLORAPREP ORN 26ML

## (undated) DEVICE — CANNULA ENDOPATH XCEL 5X100MM

## (undated) DEVICE — ELECTRODE REM PLYHSV RETURN 9

## (undated) DEVICE — GLOVE SURG ULTRA TOUCH 6

## (undated) DEVICE — CLOSURE SKIN STERI STRIP 1/2X4

## (undated) DEVICE — SOL WATER STRL IRR 1000ML

## (undated) DEVICE — DRAPE ABDOMINAL TIBURON 14X11

## (undated) DEVICE — GLOVE SURG ULTRA TOUCH 7

## (undated) DEVICE — DRESSING ABSRBNT ISLAND 3.6X8

## (undated) DEVICE — SLEEVE SCD EXPRESS KNEE MEDIUM

## (undated) DEVICE — STRAP OR TABLE 5IN X 72IN

## (undated) DEVICE — DRAPE CORETEMP FLD WRM 56X62IN

## (undated) DEVICE — GLOVE BIOGEL SKINSENSE PI 7.0

## (undated) DEVICE — NDL SAFETY 21G X 1 1/2 ECLPSE

## (undated) DEVICE — LINER SUCTION 3000CC

## (undated) DEVICE — CUTTER PROXIMATE BLUE 75MM

## (undated) DEVICE — SET TUBE PNEUMOCLEAR SE HI FLO

## (undated) DEVICE — CARTRIDGE BABCOCK GRASPER 5X45

## (undated) DEVICE — SUT 3-0 VICRYL SH CR/8 18

## (undated) DEVICE — RELOAD PROXIMATE CUT BLUE 75MM

## (undated) DEVICE — TOWEL OR DISP STRL BLUE 4/PK

## (undated) DEVICE — UNDERGLOVES BIOGEL PI SZ 7 LF

## (undated) DEVICE — TROCAR ENDOPATH XCEL 5X100MM

## (undated) DEVICE — SUT 1 48IN PDS II VIO MONO

## (undated) DEVICE — TROCAR ENDOPATH XCEL 11MM 10CM

## (undated) DEVICE — NDL INSUF ULTRA VERESS 120MM

## (undated) DEVICE — SYR ONLY LUER LOCK 20CC

## (undated) DEVICE — GLOVE SURG ULTRA TOUCH 7.5

## (undated) DEVICE — SUT SA85H SILK 2-0

## (undated) DEVICE — KIT ANTIFOG

## (undated) DEVICE — SUT SILK 0 STRANDS 30IN BLK

## (undated) DEVICE — BLADE SURG CARBON STEEL SZ11